# Patient Record
Sex: FEMALE | Race: WHITE | HISPANIC OR LATINO | Employment: OTHER | URBAN - METROPOLITAN AREA
[De-identification: names, ages, dates, MRNs, and addresses within clinical notes are randomized per-mention and may not be internally consistent; named-entity substitution may affect disease eponyms.]

---

## 2020-01-16 ENCOUNTER — OFFICE VISIT (OUTPATIENT)
Dept: FAMILY MEDICINE | Facility: CLINIC | Age: 70
End: 2020-01-16
Payer: MEDICARE

## 2020-01-16 VITALS
HEART RATE: 68 BPM | HEIGHT: 62 IN | WEIGHT: 176 LBS | SYSTOLIC BLOOD PRESSURE: 122 MMHG | DIASTOLIC BLOOD PRESSURE: 90 MMHG | BODY MASS INDEX: 32.39 KG/M2

## 2020-01-16 DIAGNOSIS — E11.9 TYPE 2 DIABETES MELLITUS WITHOUT COMPLICATION, WITHOUT LONG-TERM CURRENT USE OF INSULIN: ICD-10-CM

## 2020-01-16 DIAGNOSIS — M06.9 RHEUMATOID ARTHRITIS, INVOLVING UNSPECIFIED SITE, UNSPECIFIED RHEUMATOID FACTOR PRESENCE: ICD-10-CM

## 2020-01-16 DIAGNOSIS — M10.9 GOUT, UNSPECIFIED CAUSE, UNSPECIFIED CHRONICITY, UNSPECIFIED SITE: ICD-10-CM

## 2020-01-16 DIAGNOSIS — R30.0 DYSURIA: ICD-10-CM

## 2020-01-16 DIAGNOSIS — Z79.899 HIGH RISK MEDICATION USE: ICD-10-CM

## 2020-01-16 DIAGNOSIS — E78.2 MIXED HYPERLIPIDEMIA: ICD-10-CM

## 2020-01-16 DIAGNOSIS — I10 ESSENTIAL HYPERTENSION: ICD-10-CM

## 2020-01-16 DIAGNOSIS — K21.9 GASTROESOPHAGEAL REFLUX DISEASE, ESOPHAGITIS PRESENCE NOT SPECIFIED: Primary | ICD-10-CM

## 2020-01-16 DIAGNOSIS — M54.16 LUMBAR RADICULOPATHY: ICD-10-CM

## 2020-01-16 DIAGNOSIS — E03.9 HYPOTHYROIDISM (ACQUIRED): ICD-10-CM

## 2020-01-16 LAB
BILIRUB SERPL-MCNC: NORMAL MG/DL
BLOOD URINE, POC: NORMAL
COLOR, POC UA: YELLOW
GLUCOSE UR QL STRIP: NORMAL
KETONES UR QL STRIP: NORMAL
LEUKOCYTE ESTERASE URINE, POC: NORMAL
NITRITE, POC UA: NORMAL
PH, POC UA: 6
PROTEIN, POC: NORMAL
SPECIFIC GRAVITY, POC UA: 1.01
UROBILINOGEN, POC UA: NORMAL

## 2020-01-16 PROCEDURE — 1159F MED LIST DOCD IN RCRD: CPT | Mod: S$GLB,,, | Performed by: FAMILY MEDICINE

## 2020-01-16 PROCEDURE — 99203 OFFICE O/P NEW LOW 30 MIN: CPT | Mod: S$GLB,,, | Performed by: FAMILY MEDICINE

## 2020-01-16 PROCEDURE — 99203 PR OFFICE/OUTPT VISIT, NEW, LEVL III, 30-44 MIN: ICD-10-PCS | Mod: S$GLB,,, | Performed by: FAMILY MEDICINE

## 2020-01-16 PROCEDURE — 81003 POCT URINALYSIS, DIPSTICK OR TABLET REAGENT, AUTOMATED, WITH MICROSCOP: ICD-10-PCS | Mod: QW,S$GLB,, | Performed by: FAMILY MEDICINE

## 2020-01-16 PROCEDURE — 81003 URINALYSIS AUTO W/O SCOPE: CPT | Mod: QW,S$GLB,, | Performed by: FAMILY MEDICINE

## 2020-01-16 PROCEDURE — 1159F PR MEDICATION LIST DOCUMENTED IN MEDICAL RECORD: ICD-10-PCS | Mod: S$GLB,,, | Performed by: FAMILY MEDICINE

## 2020-01-16 RX ORDER — OMEPRAZOLE 40 MG/1
40 CAPSULE, DELAYED RELEASE ORAL DAILY
COMMUNITY
End: 2020-02-03 | Stop reason: SDUPTHER

## 2020-01-16 RX ORDER — METHOTREXATE 2.5 MG/1
20 TABLET ORAL
Qty: 32 TABLET | Refills: 5 | Status: SHIPPED | OUTPATIENT
Start: 2020-01-16 | End: 2020-01-18

## 2020-01-16 RX ORDER — OMEPRAZOLE 40 MG/1
40 CAPSULE, DELAYED RELEASE ORAL DAILY
Qty: 90 CAPSULE | Refills: 1 | Status: SHIPPED | OUTPATIENT
Start: 2020-01-16 | End: 2020-06-02 | Stop reason: SDUPTHER

## 2020-01-16 RX ORDER — VALSARTAN 160 MG/1
160 TABLET ORAL DAILY
Qty: 90 TABLET | Refills: 3 | Status: SHIPPED | OUTPATIENT
Start: 2020-01-16 | End: 2021-03-09 | Stop reason: SDUPTHER

## 2020-01-16 RX ORDER — ALLOPURINOL 300 MG/1
300 TABLET ORAL 2 TIMES DAILY
Qty: 90 TABLET | Refills: 1 | Status: SHIPPED | OUTPATIENT
Start: 2020-01-16 | End: 2020-06-02 | Stop reason: SDUPTHER

## 2020-01-16 RX ORDER — PRAVASTATIN SODIUM 40 MG/1
40 TABLET ORAL NIGHTLY
COMMUNITY
End: 2020-02-03 | Stop reason: SDUPTHER

## 2020-01-16 RX ORDER — CARVEDILOL 3.12 MG/1
3.12 TABLET ORAL 2 TIMES DAILY WITH MEALS
Qty: 180 TABLET | Refills: 1 | Status: SHIPPED | OUTPATIENT
Start: 2020-01-16 | End: 2020-06-02 | Stop reason: SDUPTHER

## 2020-01-16 RX ORDER — METFORMIN HYDROCHLORIDE EXTENDED-RELEASE TABLETS 500 MG/1
500 TABLET, FILM COATED, EXTENDED RELEASE ORAL
Qty: 90 TABLET | Refills: 3 | Status: SHIPPED | OUTPATIENT
Start: 2020-01-16 | End: 2020-03-12 | Stop reason: ALTCHOICE

## 2020-01-16 RX ORDER — METFORMIN HYDROCHLORIDE 500 MG/1
500 TABLET, EXTENDED RELEASE ORAL NIGHTLY
COMMUNITY
End: 2020-02-03 | Stop reason: SDUPTHER

## 2020-01-16 RX ORDER — VALSARTAN 160 MG/1
160 TABLET ORAL DAILY
COMMUNITY
End: 2020-02-03 | Stop reason: SDUPTHER

## 2020-01-16 RX ORDER — PRAVASTATIN SODIUM 40 MG/1
40 TABLET ORAL DAILY
Qty: 90 TABLET | Refills: 3 | Status: SHIPPED | OUTPATIENT
Start: 2020-01-16 | End: 2021-03-11 | Stop reason: SDUPTHER

## 2020-01-16 RX ORDER — GABAPENTIN 100 MG/1
100 CAPSULE ORAL NIGHTLY
COMMUNITY
End: 2020-02-03

## 2020-01-16 RX ORDER — PHENAZOPYRIDINE HYDROCHLORIDE 200 MG/1
200 TABLET, FILM COATED ORAL
Qty: 9 TABLET | Refills: 0 | Status: SHIPPED | OUTPATIENT
Start: 2020-01-16 | End: 2020-01-19

## 2020-01-16 RX ORDER — GABAPENTIN 300 MG/1
300 CAPSULE ORAL 2 TIMES DAILY
Qty: 180 CAPSULE | Refills: 1 | Status: SHIPPED | OUTPATIENT
Start: 2020-01-16 | End: 2020-06-02 | Stop reason: SDUPTHER

## 2020-01-16 RX ORDER — LEVOTHYROXINE SODIUM 100 UG/1
100 TABLET ORAL
Qty: 90 TABLET | Refills: 1 | Status: SHIPPED | OUTPATIENT
Start: 2020-01-16 | End: 2020-06-02 | Stop reason: SDUPTHER

## 2020-01-16 NOTE — PROGRESS NOTES
SUBJECTIVE:    Patient ID: Lizette Palafox is a 69 y.o. female.    Chief Complaint: Establish Care; Bilateral Shoulder Pain = 8 now , travels down arms; intermittent feet swelling; and burning with urination x2 weeks    Patient with history of rheumatoid arthritis, borderline diabetes and gout presents as a new patient to me.  She is relocating back to the area.  She last saw her primary care doctor approximately 6 months ago and had lab work done at that time.  She is currently complaining about some increased joint pain. She has been out of methotrexate for at least a week.  She reports swelling in her hands and her feet.  She also complains of a 2 week history of dysuria.  She has pain upon initiating urination..  She is having some frequency and nocturia.  She denies hematuria.  She denies vaginal symptoms.  Denies constipation      Past Medical History:   Diagnosis Date    Arthritis     Rheumatoid    Encounter for blood transfusion     Gout     Hypertension     Thyroid disease      Past Surgical History:   Procedure Laterality Date    APPENDECTOMY       SECTION      x3    DILATION AND CURETTAGE OF UTERUS      GALLBLADDER SURGERY      TOTAL HIP ARTHROPLASTY       No family history on file.    Marital Status: Legally   Alcohol History:  reports that she drank alcohol.  Tobacco History:  reports that she has never smoked. She has never used smokeless tobacco.  Drug History:  reports that she does not use drugs.    Review of patient's allergies indicates:   Allergen Reactions    Levaquin [levofloxacin] Hives and Edema       Current Outpatient Medications:     gabapentin (NEURONTIN) 100 MG capsule, Take 100 mg by mouth every evening., Disp: , Rfl:     metFORMIN (GLUCOPHAGE-XR) 500 MG 24 hr tablet, Take 500 mg by mouth every evening., Disp: , Rfl:     omeprazole (PRILOSEC) 40 MG capsule, Take 40 mg by mouth once daily., Disp: , Rfl:     pravastatin (PRAVACHOL) 40 MG tablet, Take 40 mg  by mouth every evening., Disp: , Rfl:     valsartan (DIOVAN) 160 MG tablet, Take 160 mg by mouth once daily., Disp: , Rfl:     allopurinol (ZYLOPRIM) 300 MG tablet, Take 1 tablet (300 mg total) by mouth 2 (two) times daily., Disp: 90 tablet, Rfl: 1    carvedilol (COREG) 3.125 MG tablet, Take 1 tablet (3.125 mg total) by mouth 2 (two) times daily with meals., Disp: 180 tablet, Rfl: 1    gabapentin (NEURONTIN) 300 MG capsule, Take 1 capsule (300 mg total) by mouth 2 (two) times daily. For nerve pain, Disp: 180 capsule, Rfl: 1    levothyroxine (SYNTHROID) 100 MCG tablet, Take 1 tablet (100 mcg total) by mouth before breakfast., Disp: 90 tablet, Rfl: 1    metFORMIN (FORTAMET) 500 mg 24 hr tablet, Take 1 tablet (500 mg total) by mouth daily with dinner or evening meal., Disp: 90 tablet, Rfl: 3    methotrexate 2.5 MG Tab, Take 8 tablets (20 mg total) by mouth every 7 days., Disp: 32 tablet, Rfl: 0    omeprazole (PRILOSEC) 40 MG capsule, Take 1 capsule (40 mg total) by mouth once daily., Disp: 90 capsule, Rfl: 1    pravastatin (PRAVACHOL) 40 MG tablet, Take 1 tablet (40 mg total) by mouth once daily., Disp: 90 tablet, Rfl: 3    valsartan (DIOVAN) 160 MG tablet, Take 1 tablet (160 mg total) by mouth once daily., Disp: 90 tablet, Rfl: 3    Review of Systems   Constitutional: Negative for activity change, fatigue and unexpected weight change.   HENT: Negative for hearing loss, postnasal drip, sinus pressure, sore throat and voice change.    Eyes: Negative for photophobia and visual disturbance.   Respiratory: Negative for cough, shortness of breath and wheezing.    Cardiovascular: Negative for chest pain and palpitations.   Gastrointestinal: Negative for constipation, diarrhea and nausea.   Genitourinary: Negative for difficulty urinating, frequency, hematuria and urgency.   Musculoskeletal: Positive for arthralgias, back pain, gait problem, joint swelling and myalgias.   Skin: Negative for rash.   Neurological:  "Negative for weakness, light-headedness and headaches.   Hematological: Negative for adenopathy. Does not bruise/bleed easily.   Psychiatric/Behavioral: The patient is not nervous/anxious.           Objective:      Vitals:    01/16/20 1531   BP: (!) 122/90   Pulse: 68   Weight: 79.8 kg (176 lb)   Height: 5' 2" (1.575 m)     Physical Exam   Constitutional: She is oriented to person, place, and time. Vital signs are normal. She appears well-developed and well-nourished. No distress.   Ambulates with cane   HENT:   Head: Normocephalic and atraumatic.   Right Ear: Tympanic membrane and external ear normal.   Left Ear: Tympanic membrane and external ear normal.   Eyes: Pupils are equal, round, and reactive to light. Conjunctivae, EOM and lids are normal.   Neck: Full passive range of motion without pain. Neck supple. No JVD present. No tracheal deviation present. No thyromegaly present.   Cardiovascular: Normal rate and regular rhythm. PMI is not displaced.   Pulmonary/Chest: Effort normal and breath sounds normal.   Abdominal: Soft. Bowel sounds are normal. There is no hepatosplenomegaly. There is no tenderness. There is no rebound and no guarding.   Musculoskeletal: Normal range of motion. She exhibits edema. She exhibits no tenderness.   Swelling and enlargement at PIP and MCP joints fof both hands   Neurological: She is alert and oriented to person, place, and time.   Skin: Skin is warm and dry. No rash noted.   Psychiatric: She has a normal mood and affect.   Vitals reviewed.        Assessment:       1. Gastroesophageal reflux disease, esophagitis presence not specified    2. Essential hypertension    3. Hypothyroidism (acquired)    4. Rheumatoid arthritis, involving unspecified site, unspecified rheumatoid factor presence    5. Gout, unspecified cause, unspecified chronicity, unspecified site    6. Type 2 diabetes mellitus without complication, without long-term current use of insulin    7. Mixed hyperlipidemia  "   8. Lumbar radiculopathy    9. Dysuria    10. High risk medication use         Plan:       Gastroesophageal reflux disease, esophagitis presence not specified  -     omeprazole (PRILOSEC) 40 MG capsule; Take 1 capsule (40 mg total) by mouth once daily.  Dispense: 90 capsule; Refill: 1    Essential hypertension  -     Comprehensive metabolic panel; Future; Expected date: 02/03/2020  -     carvedilol (COREG) 3.125 MG tablet; Take 1 tablet (3.125 mg total) by mouth 2 (two) times daily with meals.  Dispense: 180 tablet; Refill: 1  -     valsartan (DIOVAN) 160 MG tablet; Take 1 tablet (160 mg total) by mouth once daily.  Dispense: 90 tablet; Refill: 3    Hypothyroidism (acquired)  -     TSH w/reflex to FT4; Future; Expected date: 02/03/2020  -     levothyroxine (SYNTHROID) 100 MCG tablet; Take 1 tablet (100 mcg total) by mouth before breakfast.  Dispense: 90 tablet; Refill: 1    Rheumatoid arthritis, involving unspecified site, unspecified rheumatoid factor presence  -     Discontinue: methotrexate 2.5 MG Tab; Take 8 tablets (20 mg total) by mouth every 7 days.  Dispense: 32 tablet; Refill: 5    Gout, unspecified cause, unspecified chronicity, unspecified site  -     Uric acid; Future; Expected date: 02/03/2020  -     allopurinol (ZYLOPRIM) 300 MG tablet; Take 1 tablet (300 mg total) by mouth 2 (two) times daily.  Dispense: 90 tablet; Refill: 1    Type 2 diabetes mellitus without complication, without long-term current use of insulin  -     Hemoglobin A1c; Future; Expected date: 02/03/2020  -     Lipid panel; Future; Expected date: 02/03/2020  -     Microalbumin/creatinine urine ratio; Future; Expected date: 02/03/2020  -     metFORMIN (FORTAMET) 500 mg 24 hr tablet; Take 1 tablet (500 mg total) by mouth daily with dinner or evening meal.  Dispense: 90 tablet; Refill: 3    Mixed hyperlipidemia  -     Lipid panel; Future; Expected date: 02/03/2020  -     pravastatin (PRAVACHOL) 40 MG tablet; Take 1 tablet (40 mg total)  by mouth once daily.  Dispense: 90 tablet; Refill: 3    Lumbar radiculopathy  Comments:  mostly on the right  Orders:  -     gabapentin (NEURONTIN) 300 MG capsule; Take 1 capsule (300 mg total) by mouth 2 (two) times daily. For nerve pain  Dispense: 180 capsule; Refill: 1    Dysuria  -     POCT urinalysis, dipstick or tablet reag  -     phenazopyridine (PYRIDIUM) 200 MG tablet; Take 1 tablet (200 mg total) by mouth 3 (three) times daily with meals. for 3 days  Dispense: 9 tablet; Refill: 0    High risk medication use  -     CBC auto differential; Future; Expected date: 02/03/2020      Follow up in about 4 weeks (around 2/13/2020) for Diabetic Check-Up.        Medication adjustments made.  Patient to return for recheck on diabetes and illness

## 2020-01-17 ENCOUNTER — TELEPHONE (OUTPATIENT)
Dept: FAMILY MEDICINE | Facility: CLINIC | Age: 70
End: 2020-01-17

## 2020-01-17 DIAGNOSIS — M06.9 RHEUMATOID ARTHRITIS, INVOLVING UNSPECIFIED SITE, UNSPECIFIED RHEUMATOID FACTOR PRESENCE: ICD-10-CM

## 2020-01-17 NOTE — TELEPHONE ENCOUNTER
----- Message from Kinsey Erazo sent at 1/17/2020  9:51 AM CST -----  Contact: Lizette  Refill Methotrexate 2.5 mg . The patient is out .  [facility] on [address] pts # [phone] GH

## 2020-01-18 ENCOUNTER — HOSPITAL ENCOUNTER (EMERGENCY)
Facility: HOSPITAL | Age: 70
Discharge: HOME | End: 2020-01-18
Attending: EMERGENCY MEDICINE
Payer: MEDICARE

## 2020-01-18 VITALS
BODY MASS INDEX: 32.02 KG/M2 | TEMPERATURE: 98 F | WEIGHT: 174 LBS | OXYGEN SATURATION: 98 % | SYSTOLIC BLOOD PRESSURE: 132 MMHG | HEART RATE: 82 BPM | HEIGHT: 62 IN | RESPIRATION RATE: 18 BRPM | DIASTOLIC BLOOD PRESSURE: 72 MMHG

## 2020-01-18 DIAGNOSIS — M06.9 RHEUMATOID ARTHRITIS FLARE: Primary | ICD-10-CM

## 2020-01-18 DIAGNOSIS — M06.9 RHEUMATOID ARTHRITIS, INVOLVING UNSPECIFIED SITE, UNSPECIFIED RHEUMATOID FACTOR PRESENCE: ICD-10-CM

## 2020-01-18 PROCEDURE — 99282 EMERGENCY DEPT VISIT SF MDM: CPT

## 2020-01-18 RX ORDER — METHOTREXATE 2.5 MG/1
2.5 TABLET ORAL
Qty: 8 TABLET | Refills: 0 | Status: SHIPPED | OUTPATIENT
Start: 2020-01-18 | End: 2020-01-23 | Stop reason: SDUPTHER

## 2020-01-18 NOTE — ED NOTES
Pt c/o R arm and neck pain x1 week. Pts R arm is reddened and swollen at the wrist. Pt denies trauma to the area. Pt states her L arm is starting to feel the same way. Pt states she thinks it has to do with her not taking her Methotrexate. States she has been out of her medication for the past week as well. Pt is resting on stretcher, NAD, aaax3 and is alone in room.

## 2020-01-18 NOTE — ED PROVIDER NOTES
Encounter Date: 2020       History     Chief Complaint   Patient presents with    Arm Pain     bilateral arm pain out of RA medication     Presents with complaint of generalize joint pain worse at bilateral wrist.  Pt reports she was seen by her PCP for a refill of her Mexotrexate She was told DR. Mccord would call the medication in but when she went to the pharmacy it was denied.  I call [facility] and the tech told me it was denied because Dr. Mccord failed to call it in        Review of patient's allergies indicates:   Allergen Reactions    Levaquin [levofloxacin] Hives and Edema     Past Medical History:   Diagnosis Date    Arthritis     Rheumatoid    Encounter for blood transfusion     Gout     Hypertension     Thyroid disease      Past Surgical History:   Procedure Laterality Date    APPENDECTOMY       SECTION      x3    DILATION AND CURETTAGE OF UTERUS      GALLBLADDER SURGERY      TOTAL HIP ARTHROPLASTY       History reviewed. No pertinent family history.  Social History     Tobacco Use    Smoking status: Never Smoker    Smokeless tobacco: Never Used   Substance Use Topics    Alcohol use: Not Currently     Frequency: Never    Drug use: Never     Review of Systems   Constitutional: Negative for fever.   Respiratory: Negative for cough, shortness of breath and wheezing.    Cardiovascular: Negative for chest pain, palpitations and leg swelling.   Gastrointestinal: Negative for abdominal pain, diarrhea, nausea and vomiting.   Genitourinary: Negative for dysuria.   Musculoskeletal: Positive for joint swelling. Negative for back pain.        Joint pain   Skin: Negative for rash.   Neurological: Negative for weakness.       Physical Exam     Initial Vitals [20 1201]   BP Pulse Resp Temp SpO2   132/72 82 18 98.3 °F (36.8 °C) 98 %      MAP       --         Physical Exam    Constitutional: She appears well-developed and well-nourished.   HENT:   Head: Normocephalic and atraumatic.    Eyes: Conjunctivae are normal.   Neck: Normal range of motion. Neck supple.   Cardiovascular: Normal rate and regular rhythm.   Pulmonary/Chest: Breath sounds normal. No respiratory distress.   Musculoskeletal: Normal range of motion.   Joint swelling at the wrist increase pain with movement pain with movement    Neurological: She is alert and oriented to person, place, and time. No sensory deficit. GCS score is 15. GCS eye subscore is 4. GCS verbal subscore is 5. GCS motor subscore is 6.   Skin: Skin is warm and dry. Capillary refill takes less than 2 seconds.   Psychiatric: She has a normal mood and affect. Thought content normal.         ED Course   Procedures  Labs Reviewed - No data to display       Imaging Results    None          Medical Decision Making:   Initial Assessment:   Joint pain and swelling  Have discussed this pt with DR. Longoria              Attending Attestation:     Physician Attestation Statement for NP/PA:   I discussed this assessment and plan of this patient with the NP/PA, but I did not personally examine the patient. The face to face encounter was performed by the NP/PA.                                Clinical Impression:       ICD-10-CM ICD-9-CM   1. Rheumatoid arthritis flare M06.9 714.0   2. Rheumatoid arthritis, involving unspecified site, unspecified rheumatoid factor presence M06.9 714.0                             Poppy Gonzalez NP  01/18/20 1320       Lauri Cyr MD  01/19/20 1861

## 2020-01-23 DIAGNOSIS — M06.9 RHEUMATOID ARTHRITIS, INVOLVING UNSPECIFIED SITE, UNSPECIFIED RHEUMATOID FACTOR PRESENCE: Primary | ICD-10-CM

## 2020-01-23 RX ORDER — METHOTREXATE 2.5 MG/1
20 TABLET ORAL
Qty: 32 TABLET | Refills: 0 | Status: SHIPPED | OUTPATIENT
Start: 2020-01-23 | End: 2020-02-03 | Stop reason: SDUPTHER

## 2020-01-23 NOTE — TELEPHONE ENCOUNTER
----- Message from David Jesus sent at 1/22/2020 11:14 AM CST -----  Contact: Elsie Jimenez   Pt was in the ER and she needs to schedule an ER follow up and she also needs a refill on her methotrexate 2.5 MG   [facility] to [facility] [address]  Pt# [phone]

## 2020-01-23 NOTE — TELEPHONE ENCOUNTER
Spoke with pts daughter visit scheduled.  Requests methotrexate refill to be sent to [facility] [address] -DN

## 2020-01-24 NOTE — TELEPHONE ENCOUNTER
prescription sent to   [id number] - [place], [place] - [address]  AT [facility]  [address] [facility] [address]  Phone: [phone] Fax: [phone]

## 2020-02-03 ENCOUNTER — OFFICE VISIT (OUTPATIENT)
Dept: FAMILY MEDICINE | Facility: CLINIC | Age: 70
End: 2020-02-03
Payer: MEDICARE

## 2020-02-03 VITALS
SYSTOLIC BLOOD PRESSURE: 124 MMHG | WEIGHT: 174 LBS | BODY MASS INDEX: 32.02 KG/M2 | DIASTOLIC BLOOD PRESSURE: 82 MMHG | HEART RATE: 74 BPM | HEIGHT: 62 IN

## 2020-02-03 DIAGNOSIS — M06.9 RHEUMATOID ARTHRITIS, INVOLVING UNSPECIFIED SITE, UNSPECIFIED RHEUMATOID FACTOR PRESENCE: Primary | ICD-10-CM

## 2020-02-03 DIAGNOSIS — E11.9 TYPE 2 DIABETES MELLITUS WITHOUT COMPLICATION, WITHOUT LONG-TERM CURRENT USE OF INSULIN: ICD-10-CM

## 2020-02-03 DIAGNOSIS — M10.9 GOUT, UNSPECIFIED CAUSE, UNSPECIFIED CHRONICITY, UNSPECIFIED SITE: ICD-10-CM

## 2020-02-03 PROCEDURE — 99214 PR OFFICE/OUTPT VISIT, EST, LEVL IV, 30-39 MIN: ICD-10-PCS | Mod: S$GLB,,, | Performed by: FAMILY MEDICINE

## 2020-02-03 PROCEDURE — 99214 OFFICE O/P EST MOD 30 MIN: CPT | Mod: S$GLB,,, | Performed by: FAMILY MEDICINE

## 2020-02-03 RX ORDER — NAPROXEN SODIUM 220 MG/1
81 TABLET, FILM COATED ORAL DAILY
COMMUNITY

## 2020-02-03 RX ORDER — METHOTREXATE 2.5 MG/1
20 TABLET ORAL
Qty: 32 TABLET | Refills: 0 | Status: SHIPPED | OUTPATIENT
Start: 2020-02-03 | End: 2020-03-02 | Stop reason: SDUPTHER

## 2020-02-03 RX ORDER — PREDNISONE 20 MG/1
TABLET ORAL
Qty: 20 TABLET | Refills: 0 | Status: SHIPPED | OUTPATIENT
Start: 2020-02-03 | End: 2020-05-06 | Stop reason: SDUPTHER

## 2020-02-03 NOTE — PROGRESS NOTES
SUBJECTIVE:    Patient ID: Lizette Palafox is a 69 y.o. female.    Chief Complaint: Hospital Follow Up and Shoulder Pain (right shoulder pain all the way down to arm)    Patient presents status post ER visit a few weeks ago secondary to rheumatoid arthritis flare.  She has a new patient to me about a month ago.  She had been taking methotrexate but without of her medications.  She has been having more pain in her wrists and shoulders.  She was given gabapentin help with neuropathic pain but has not found much help with this.  No issues with other medications.     Office Visit on 2020   Component Date Value Ref Range Status    Color, UA 2020 Yellow   Final    Spec Grav UA 2020 1.015   Final    pH, UA 2020 6.0   Final    WBC, UA 2020 -   Final    Nitrite, UA 2020 -   Final    Protein 2020 -   Final    Glucose, UA 2020 -   Final    Ketones, UA 2020 -   Final    Urobilinogen, UA 2020 +-   Final    Bilirubin 2020 -   Final    Blood, UA 2020 -   Final       Past Medical History:   Diagnosis Date    Arthritis     Rheumatoid    Encounter for blood transfusion     Gout     Hypertension     Thyroid disease      Past Surgical History:   Procedure Laterality Date    APPENDECTOMY       SECTION      x3    DILATION AND CURETTAGE OF UTERUS      GALLBLADDER SURGERY      TOTAL HIP ARTHROPLASTY       No family history on file.    Marital Status: Legally   Alcohol History:  reports that she drank alcohol.  Tobacco History:  reports that she has never smoked. She has never used smokeless tobacco.  Drug History:  reports that she does not use drugs.    Review of patient's allergies indicates:   Allergen Reactions    Levaquin [levofloxacin] Hives and Edema       Current Outpatient Medications:     allopurinol (ZYLOPRIM) 300 MG tablet, Take 1 tablet (300 mg total) by mouth 2 (two) times daily., Disp: 90 tablet, Rfl: 1     "aspirin (ECOTRIN) 81 MG EC tablet, Take 81 mg by mouth once daily., Disp: , Rfl:     carvedilol (COREG) 3.125 MG tablet, Take 1 tablet (3.125 mg total) by mouth 2 (two) times daily with meals., Disp: 180 tablet, Rfl: 1    gabapentin (NEURONTIN) 300 MG capsule, Take 1 capsule (300 mg total) by mouth 2 (two) times daily. For nerve pain, Disp: 180 capsule, Rfl: 1    levothyroxine (SYNTHROID) 100 MCG tablet, Take 1 tablet (100 mcg total) by mouth before breakfast., Disp: 90 tablet, Rfl: 1    metFORMIN (FORTAMET) 500 mg 24 hr tablet, Take 1 tablet (500 mg total) by mouth daily with dinner or evening meal., Disp: 90 tablet, Rfl: 3    methotrexate 2.5 MG Tab, Take 8 tablets (20 mg total) by mouth every 7 days., Disp: 32 tablet, Rfl: 0    omeprazole (PRILOSEC) 40 MG capsule, Take 1 capsule (40 mg total) by mouth once daily., Disp: 90 capsule, Rfl: 1    pravastatin (PRAVACHOL) 40 MG tablet, Take 1 tablet (40 mg total) by mouth once daily., Disp: 90 tablet, Rfl: 3    valsartan (DIOVAN) 160 MG tablet, Take 1 tablet (160 mg total) by mouth once daily., Disp: 90 tablet, Rfl: 3    predniSONE (DELTASONE) 20 MG tablet, Three tablets daily for 3 days, 2 tablets daily for 3 days, 1 tablet daily for 3 days, half tablet daily for 4 days for arthritis flare, Disp: 20 tablet, Rfl: 0    Review of Systems   Constitutional: Positive for fatigue.   Musculoskeletal: Positive for arthralgias and joint swelling.          Objective:      Vitals:    02/03/20 1207   BP: 124/82   Pulse: 74   Weight: 78.9 kg (174 lb)   Height: 5' 2" (1.575 m)     Physical Exam   Constitutional: She is oriented to person, place, and time. She appears well-developed and well-nourished.   Uncomfortable due to pain.  Using a walker.   Cardiovascular: Normal rate and regular rhythm.   Pulmonary/Chest: Effort normal and breath sounds normal.   Abdominal: Soft. Bowel sounds are normal.   Musculoskeletal:        Right shoulder: She exhibits decreased range of " motion.   Swelling of bilateral wrists   Neurological: She is alert and oriented to person, place, and time.   Skin: Skin is warm and dry. No erythema.   Vitals reviewed.        Assessment:       1. Rheumatoid arthritis, involving unspecified site, unspecified rheumatoid factor presence    2. Gout, unspecified cause, unspecified chronicity, unspecified site    3. Type 2 diabetes mellitus without complication, without long-term current use of insulin         Plan:       Rheumatoid arthritis, involving unspecified site, unspecified rheumatoid factor presence  -     Ambulatory Referral to Rheumatology  -     predniSONE (DELTASONE) 20 MG tablet; Three tablets daily for 3 days, 2 tablets daily for 3 days, 1 tablet daily for 3 days, half tablet daily for 4 days for arthritis flare  Dispense: 20 tablet; Refill: 0  -     methotrexate 2.5 MG Tab; Take 8 tablets (20 mg total) by mouth every 7 days.  Dispense: 32 tablet; Refill: 0    Gout, unspecified cause, unspecified chronicity, unspecified site  -     Ambulatory Referral to Rheumatology    Type 2 diabetes mellitus without complication, without long-term current use of insulin      Follow up for keep current.

## 2020-02-03 NOTE — PATIENT INSTRUCTIONS
Watch your diet while taking steroids to prevent high sugars.  Avoid cakes candies cookies and other sugary items. labs due in 2-3 weeks

## 2020-02-17 ENCOUNTER — TELEPHONE (OUTPATIENT)
Dept: FAMILY MEDICINE | Facility: CLINIC | Age: 70
End: 2020-02-17

## 2020-02-17 NOTE — TELEPHONE ENCOUNTER
----- Message from David Jesus sent at 2/17/2020 11:55 AM CST -----  Contact: Lizette condon  Pt needs nurse to give her a call back please.   Pt# [phone]   Detail Level: Detailed

## 2020-02-17 NOTE — TELEPHONE ENCOUNTER
PT RETURNED CALL, states she is unable to see dr mccrary until July.  New phone number given to try and schedule with dr power.  Pt verbalized understanding -DN

## 2020-02-18 ENCOUNTER — TELEPHONE (OUTPATIENT)
Dept: FAMILY MEDICINE | Facility: CLINIC | Age: 70
End: 2020-02-18

## 2020-02-18 NOTE — TELEPHONE ENCOUNTER
Spoke to patient:  She will have fasting labs done later this week for OV 3/2 with Dr prater. Orders in [facility] from 1/16./ba

## 2020-03-02 ENCOUNTER — OFFICE VISIT (OUTPATIENT)
Dept: FAMILY MEDICINE | Facility: CLINIC | Age: 70
End: 2020-03-02
Payer: MEDICARE

## 2020-03-02 DIAGNOSIS — E11.9 TYPE 2 DIABETES MELLITUS WITHOUT COMPLICATION, WITHOUT LONG-TERM CURRENT USE OF INSULIN: ICD-10-CM

## 2020-03-02 DIAGNOSIS — I10 ESSENTIAL HYPERTENSION: ICD-10-CM

## 2020-03-02 DIAGNOSIS — M06.9 RHEUMATOID ARTHRITIS, INVOLVING UNSPECIFIED SITE, UNSPECIFIED RHEUMATOID FACTOR PRESENCE: ICD-10-CM

## 2020-03-02 DIAGNOSIS — M10.9 GOUT, UNSPECIFIED CAUSE, UNSPECIFIED CHRONICITY, UNSPECIFIED SITE: ICD-10-CM

## 2020-03-02 DIAGNOSIS — M05.9 RHEUMATOID ARTHRITIS WITH POSITIVE RHEUMATOID FACTOR, INVOLVING UNSPECIFIED SITE: Primary | ICD-10-CM

## 2020-03-02 DIAGNOSIS — Z79.899 HIGH RISK MEDICATION USE: ICD-10-CM

## 2020-03-02 DIAGNOSIS — J01.00 ACUTE NON-RECURRENT MAXILLARY SINUSITIS: ICD-10-CM

## 2020-03-02 DIAGNOSIS — E78.2 MIXED HYPERLIPIDEMIA: ICD-10-CM

## 2020-03-02 DIAGNOSIS — E03.9 HYPOTHYROIDISM (ACQUIRED): ICD-10-CM

## 2020-03-02 PROCEDURE — 99214 PR OFFICE/OUTPT VISIT, EST, LEVL IV, 30-39 MIN: ICD-10-PCS | Mod: S$GLB,,, | Performed by: FAMILY MEDICINE

## 2020-03-02 PROCEDURE — 99214 OFFICE O/P EST MOD 30 MIN: CPT | Mod: S$GLB,,, | Performed by: FAMILY MEDICINE

## 2020-03-02 RX ORDER — METHOTREXATE 2.5 MG/1
20 TABLET ORAL
Qty: 32 TABLET | Refills: 5 | Status: SHIPPED | OUTPATIENT
Start: 2020-03-02 | End: 2021-01-12 | Stop reason: SDUPTHER

## 2020-03-02 RX ORDER — AZITHROMYCIN 250 MG/1
TABLET, FILM COATED ORAL
Qty: 6 TABLET | Refills: 0 | Status: SHIPPED | OUTPATIENT
Start: 2020-03-02 | End: 2020-06-02 | Stop reason: ALTCHOICE

## 2020-03-02 NOTE — PROGRESS NOTES
SUBJECTIVE:    Patient ID: Lizette Palafox is a 69 y.o. female.    Chief Complaint: 4 Week Followup and productive cough    Patient with past medical history of rheumatoid arthritis on methotrexate presents with a one-week history of productive cough.  She complains of sore throat and runny nose but no fever.  she is using Tylenol to help with the sore throat.  Denies shortness of breath or dyspnea on exertion.  Day reports all day cough. Used otc meds but this made her dizzy  Steroids given for weeks ago for rheumatoid flare and shoulder pain. This has improved.  She has an appointment with the rheumatologist with it is not for another 4 months.        Past Medical History:   Diagnosis Date    Arthritis     Rheumatoid    Encounter for blood transfusion     Gout     Hypertension     Thyroid disease      Past Surgical History:   Procedure Laterality Date    APPENDECTOMY       SECTION      x3    DILATION AND CURETTAGE OF UTERUS      GALLBLADDER SURGERY      TOTAL HIP ARTHROPLASTY       History reviewed. No pertinent family history.    Marital Status: Legally   Alcohol History:  reports that she drank alcohol.  Tobacco History:  reports that she has never smoked. She has never used smokeless tobacco.  Drug History:  reports that she does not use drugs.    Review of patient's allergies indicates:   Allergen Reactions    Levaquin [levofloxacin] Hives and Edema       Current Outpatient Medications:     allopurinol (ZYLOPRIM) 300 MG tablet, Take 1 tablet (300 mg total) by mouth 2 (two) times daily., Disp: 90 tablet, Rfl: 1    aspirin (ECOTRIN) 81 MG EC tablet, Take 81 mg by mouth once daily., Disp: , Rfl:     carvedilol (COREG) 3.125 MG tablet, Take 1 tablet (3.125 mg total) by mouth 2 (two) times daily with meals., Disp: 180 tablet, Rfl: 1    gabapentin (NEURONTIN) 300 MG capsule, Take 1 capsule (300 mg total) by mouth 2 (two) times daily. For nerve pain, Disp: 180 capsule, Rfl: 1     "levothyroxine (SYNTHROID) 100 MCG tablet, Take 1 tablet (100 mcg total) by mouth before breakfast., Disp: 90 tablet, Rfl: 1    metFORMIN (FORTAMET) 500 mg 24 hr tablet, Take 1 tablet (500 mg total) by mouth daily with dinner or evening meal., Disp: 90 tablet, Rfl: 3    methotrexate 2.5 MG Tab, Take 8 tablets (20 mg total) by mouth every 7 days., Disp: 32 tablet, Rfl: 5    omeprazole (PRILOSEC) 40 MG capsule, Take 1 capsule (40 mg total) by mouth once daily., Disp: 90 capsule, Rfl: 1    pravastatin (PRAVACHOL) 40 MG tablet, Take 1 tablet (40 mg total) by mouth once daily., Disp: 90 tablet, Rfl: 3    predniSONE (DELTASONE) 20 MG tablet, Three tablets daily for 3 days, 2 tablets daily for 3 days, 1 tablet daily for 3 days, half tablet daily for 4 days for arthritis flare, Disp: 20 tablet, Rfl: 0    valsartan (DIOVAN) 160 MG tablet, Take 1 tablet (160 mg total) by mouth once daily., Disp: 90 tablet, Rfl: 3    azithromycin (ZITHROMAX Z-NESHA) 250 MG tablet, 2 tabs today, then 1 tab daily for next 4 days, Disp: 6 tablet, Rfl: 0    Review of Systems   All other systems reviewed and are negative.         Objective:      Vitals:    03/02/20 1229   BP: (P) 122/70   Pulse: (P) 76   Temp: (P) 98.2 °F (36.8 °C)   Weight: (P) 75.8 kg (167 lb)   Height: (P) 5' 2" (1.575 m)     Physical Exam   Constitutional: She is oriented to person, place, and time. Vital signs are normal. She appears well-developed and well-nourished. No distress.   Mildly ill-appearing  Ambulates with cane   HENT:   Head: Normocephalic and atraumatic.   Right Ear: Tympanic membrane and external ear normal.   Left Ear: Tympanic membrane and external ear normal.   Nose: Mucosal edema and rhinorrhea present. Right sinus exhibits maxillary sinus tenderness. Left sinus exhibits maxillary sinus tenderness.   Eyes: Pupils are equal, round, and reactive to light. Conjunctivae, EOM and lids are normal.   Neck: Full passive range of motion without pain. Neck " supple. No JVD present. No tracheal deviation present. No thyromegaly present.   Cardiovascular: Normal rate and regular rhythm. PMI is not displaced.   Pulmonary/Chest: Effort normal and breath sounds normal.   Abdominal: Soft. Bowel sounds are normal. There is no hepatosplenomegaly. There is no tenderness. There is no rebound and no guarding.   Musculoskeletal: She exhibits deformity. She exhibits no edema or tenderness.   Neurological: She is alert and oriented to person, place, and time.   Skin: Skin is warm and dry. No rash noted.   Psychiatric: She has a normal mood and affect.   Vitals reviewed.        Assessment:       1. Rheumatoid arthritis with positive rheumatoid factor, involving unspecified site    2. Type 2 diabetes mellitus without complication, without long-term current use of insulin    3. Mixed hyperlipidemia    4. Lumbar radiculopathy    5. Gastroesophageal reflux disease, esophagitis presence not specified    6. Essential hypertension    7. Hypothyroidism (acquired)    8. Gout, unspecified cause, unspecified chronicity, unspecified site    9. High risk medication use    10. Acute non-recurrent maxillary sinusitis    11. Rheumatoid arthritis, involving unspecified site, unspecified rheumatoid factor presence         Plan:       Rheumatoid arthritis with positive rheumatoid factor, involving unspecified site  -     Rheumatoid factor; Future; Expected date: 03/02/2020  -     Sedimentation rate; Future; Expected date: 03/02/2020  -     CBC auto differential; Future; Expected date: 03/02/2020    Type 2 diabetes mellitus without complication, without long-term current use of insulin  -     Hemoglobin A1c; Future; Expected date: 03/02/2020  -     Microalbumin/creatinine urine ratio; Future; Expected date: 03/02/2020    Mixed hyperlipidemia  -     Lipid panel; Future; Expected date: 03/02/2020    Lumbar radiculopathy    Gastroesophageal reflux disease, esophagitis presence not specified    Essential  hypertension  -     Comprehensive metabolic panel; Future; Expected date: 03/02/2020    Hypothyroidism (acquired)  -     TSH w/reflex to FT4; Future; Expected date: 03/02/2020    Gout, unspecified cause, unspecified chronicity, unspecified site  -     Uric acid; Future; Expected date: 03/02/2020    High risk medication use    Acute non-recurrent maxillary sinusitis  -     azithromycin (ZITHROMAX Z-NESHA) 250 MG tablet; 2 tabs today, then 1 tab daily for next 4 days  Dispense: 6 tablet; Refill: 0    Rheumatoid arthritis, involving unspecified site, unspecified rheumatoid factor presence  -     methotrexate 2.5 MG Tab; Take 8 tablets (20 mg total) by mouth every 7 days.  Dispense: 32 tablet; Refill: 5    Other orders  -     Sedimentation rate, automated      Follow up in about 3 months (around 6/2/2020) for DM, RA.

## 2020-03-03 ENCOUNTER — TELEPHONE (OUTPATIENT)
Dept: RHEUMATOLOGY | Facility: CLINIC | Age: 70
End: 2020-03-03

## 2020-03-03 LAB
ALBUMIN SERPL-MCNC: 3.8 G/DL (ref 3.6–5.1)
ALBUMIN/CREAT UR: 7 MCG/MG CREAT
ALBUMIN/GLOB SERPL: 1.5 (CALC) (ref 1–2.5)
ALP SERPL-CCNC: 85 U/L (ref 37–153)
ALT SERPL-CCNC: 8 U/L (ref 6–29)
AST SERPL-CCNC: 15 U/L (ref 10–35)
BASOPHILS # BLD AUTO: 38 CELLS/UL (ref 0–200)
BASOPHILS NFR BLD AUTO: 0.4 %
BILIRUB SERPL-MCNC: 0.5 MG/DL (ref 0.2–1.2)
BUN SERPL-MCNC: 22 MG/DL (ref 7–25)
BUN/CREAT SERPL: NORMAL (CALC) (ref 6–22)
CALCIUM SERPL-MCNC: 9.9 MG/DL (ref 8.6–10.4)
CHLORIDE SERPL-SCNC: 108 MMOL/L (ref 98–110)
CHOLEST SERPL-MCNC: 147 MG/DL
CHOLEST/HDLC SERPL: 3 (CALC)
CO2 SERPL-SCNC: 23 MMOL/L (ref 20–32)
CREAT SERPL-MCNC: 0.93 MG/DL (ref 0.6–0.93)
CREAT UR-MCNC: 92 MG/DL (ref 20–275)
EOSINOPHIL # BLD AUTO: 649 CELLS/UL (ref 15–500)
EOSINOPHIL NFR BLD AUTO: 6.9 %
ERYTHROCYTE [DISTWIDTH] IN BLOOD BY AUTOMATED COUNT: 13.5 % (ref 11–15)
ERYTHROCYTE [SEDIMENTATION RATE] IN BLOOD BY WESTERGREN METHOD: 48 MM/H
GFRSERPLBLD MDRD-ARVRAT: 62 ML/MIN/1.73M2
GLOBULIN SER CALC-MCNC: 2.5 G/DL (CALC) (ref 1.9–3.7)
GLUCOSE SERPL-MCNC: 96 MG/DL (ref 65–99)
HBA1C MFR BLD: 5.7 % OF TOTAL HGB
HCT VFR BLD AUTO: 32.8 % (ref 35–45)
HDLC SERPL-MCNC: 49 MG/DL
HGB BLD-MCNC: 10.8 G/DL (ref 11.7–15.5)
LDLC SERPL CALC-MCNC: 75 MG/DL (CALC)
LYMPHOCYTES # BLD AUTO: 1316 CELLS/UL (ref 850–3900)
LYMPHOCYTES NFR BLD AUTO: 14 %
MCH RBC QN AUTO: 33.3 PG (ref 27–33)
MCHC RBC AUTO-ENTMCNC: 32.9 G/DL (ref 32–36)
MCV RBC AUTO: 101.2 FL (ref 80–100)
MICROALBUMIN UR-MCNC: 0.6 MG/DL
MONOCYTES # BLD AUTO: 367 CELLS/UL (ref 200–950)
MONOCYTES NFR BLD AUTO: 3.9 %
NEUTROPHILS # BLD AUTO: 7031 CELLS/UL (ref 1500–7800)
NEUTROPHILS NFR BLD AUTO: 74.8 %
NONHDLC SERPL-MCNC: 98 MG/DL (CALC)
PLATELET # BLD AUTO: 334 THOUSAND/UL (ref 140–400)
PMV BLD REES-ECKER: 9.7 FL (ref 7.5–12.5)
POTASSIUM SERPL-SCNC: 4.3 MMOL/L (ref 3.5–5.3)
PROT SERPL-MCNC: 6.3 G/DL (ref 6.1–8.1)
RBC # BLD AUTO: 3.24 MILLION/UL (ref 3.8–5.1)
RHEUMATOID FACT SERPL-ACNC: <14 IU/ML
SODIUM SERPL-SCNC: 141 MMOL/L (ref 135–146)
T4 FREE SERPL-MCNC: 1.4 NG/DL (ref 0.8–1.8)
TRIGL SERPL-MCNC: 152 MG/DL
TSH SERPL-ACNC: 5.54 MIU/L (ref 0.4–4.5)
URATE SERPL-MCNC: 3 MG/DL (ref 2.5–7)
WBC # BLD AUTO: 9.4 THOUSAND/UL (ref 3.8–10.8)

## 2020-03-03 NOTE — TELEPHONE ENCOUNTER
----- Message from Princess PHANI Campos sent at 3/3/2020 11:49 AM CST -----  Contact: pt  Type: Needs Medical Advice    Who Called:  Patient  Best Call Back Number:   Additional Information: Patient is requesting a call in regards to seeing if the provider has any available appt sooner than July. She has a appt with Dr. Corona.

## 2020-03-04 ENCOUNTER — TELEPHONE (OUTPATIENT)
Dept: FAMILY MEDICINE | Facility: CLINIC | Age: 70
End: 2020-03-04

## 2020-03-04 NOTE — TELEPHONE ENCOUNTER
----- Message from Klaudia Snyder sent at 3/4/2020  4:39 PM CST -----  Pharm faxed over a drug change for Meformin er 500 mg osmotic tabs   Alt drug therapy is preferred product required

## 2020-03-05 NOTE — TELEPHONE ENCOUNTER
Attempted to return patient call. Left a message informing that there is nothing sooner than scheduled appointment.

## 2020-03-10 ENCOUNTER — TELEPHONE (OUTPATIENT)
Dept: FAMILY MEDICINE | Facility: CLINIC | Age: 70
End: 2020-03-10

## 2020-03-10 DIAGNOSIS — Z79.899 LONG-TERM USE OF HIGH-RISK MEDICATION: ICD-10-CM

## 2020-03-10 DIAGNOSIS — E03.9 HYPOTHYROIDISM (ACQUIRED): Primary | ICD-10-CM

## 2020-03-10 NOTE — TELEPHONE ENCOUNTER
----- Message from Josh Mccord MD sent at 3/10/2020  8:15 AM CDT -----  Please contact the patient and let them know that their labs are ok. We will be watching her thyroid however because it was slightly abnormal. Some of her inflammatory markers were elevated as well but this is most likely due to the rheumatoid flare she was having. Continue current medications and make sure to take your folic acid. Repeat tsh and cbc 1 week prior to next visit.

## 2020-03-10 NOTE — TELEPHONE ENCOUNTER
Spoke to patient with results verbatim per Dr Mccodr. Verbalized understanding on all. Remind me created for lab to be done prior to 6/2 office visit.

## 2020-03-10 NOTE — PROGRESS NOTES
Spoke to patient with results verbatim per Dr Mccord. Verbalized understanding on all. Remind me created for lab to be done prior to 6/2 office visit.

## 2020-03-12 DIAGNOSIS — E11.9 TYPE 2 DIABETES MELLITUS WITHOUT COMPLICATION, WITHOUT LONG-TERM CURRENT USE OF INSULIN: Primary | ICD-10-CM

## 2020-03-12 RX ORDER — METFORMIN HYDROCHLORIDE 500 MG/1
500 TABLET, EXTENDED RELEASE ORAL
Qty: 90 TABLET | Refills: 3 | Status: SHIPPED | OUTPATIENT
Start: 2020-03-12 | End: 2021-03-03 | Stop reason: SDUPTHER

## 2020-04-07 ENCOUNTER — TELEPHONE (OUTPATIENT)
Dept: FAMILY MEDICINE | Facility: CLINIC | Age: 70
End: 2020-04-07

## 2020-04-07 NOTE — TELEPHONE ENCOUNTER
----- Message from Juli Maravilla MA sent at 4/7/2020  3:06 PM CDT -----  Pt is requesting a refill:    Methotrexate 2.5       Ph[facility] - [facility]      Pt - [phone]

## 2020-04-07 NOTE — TELEPHONE ENCOUNTER
Patient has refills available at pharmacy.  Patient verbalized understanding.  Informed to call with questions/concerns -DN

## 2020-04-09 ENCOUNTER — TELEPHONE (OUTPATIENT)
Dept: FAMILY MEDICINE | Facility: CLINIC | Age: 70
End: 2020-04-09

## 2020-04-09 NOTE — TELEPHONE ENCOUNTER
Spoke to Adriana.  The patient does not have a smart device so Adriana will set up the Portal on her personal phone.  Set-up info sent to her email address.  She has an android phone.  Re-set overdue reminder for late May for labs due/ba

## 2020-05-06 ENCOUNTER — TELEPHONE (OUTPATIENT)
Dept: FAMILY MEDICINE | Facility: CLINIC | Age: 70
End: 2020-05-06

## 2020-05-06 DIAGNOSIS — M06.9 RHEUMATOID ARTHRITIS, INVOLVING UNSPECIFIED SITE, UNSPECIFIED RHEUMATOID FACTOR PRESENCE: ICD-10-CM

## 2020-05-06 RX ORDER — PREDNISONE 20 MG/1
TABLET ORAL
Qty: 20 TABLET | Refills: 0 | Status: SHIPPED | OUTPATIENT
Start: 2020-05-06 | End: 2020-06-02 | Stop reason: ALTCHOICE

## 2020-05-06 NOTE — TELEPHONE ENCOUNTER
steroid pack sent in for pain. Be sure to watch her sugars. Be sure to keep visit with rheumatology

## 2020-05-06 NOTE — TELEPHONE ENCOUNTER
----- Message from Ngoc Larson sent at 5/6/2020  2:01 PM CDT -----  Contact: Jalen Daughter   Pt is in a lot of pain even taking all of her medications and they cannot get into a Rheumatologist until late May due to COVD.  cb # [phone]

## 2020-05-06 NOTE — TELEPHONE ENCOUNTER
Spoke with patient c/o shoulder r pain in arm, pain stretches to ribs.  Some swelling in the shoulder. L arm is starting to do the same.  Pain = 10 now.  Taking tylenol, only helps some.  Taking gabapentin also.  To dr prater for review -DN

## 2020-05-08 NOTE — TELEPHONE ENCOUNTER
Patient notified of the below message per dr prater.  Patient verbalized understanding, informed to return call with questions/concerns -DN

## 2020-05-18 ENCOUNTER — TELEPHONE (OUTPATIENT)
Dept: FAMILY MEDICINE | Facility: CLINIC | Age: 70
End: 2020-05-18

## 2020-06-02 ENCOUNTER — OFFICE VISIT (OUTPATIENT)
Dept: FAMILY MEDICINE | Facility: CLINIC | Age: 70
End: 2020-06-02
Payer: MEDICARE

## 2020-06-02 VITALS
HEART RATE: 80 BPM | TEMPERATURE: 98 F | SYSTOLIC BLOOD PRESSURE: 110 MMHG | HEIGHT: 62 IN | WEIGHT: 169 LBS | DIASTOLIC BLOOD PRESSURE: 86 MMHG | BODY MASS INDEX: 31.1 KG/M2

## 2020-06-02 DIAGNOSIS — M54.16 LUMBAR RADICULOPATHY: ICD-10-CM

## 2020-06-02 DIAGNOSIS — K21.9 GASTROESOPHAGEAL REFLUX DISEASE, ESOPHAGITIS PRESENCE NOT SPECIFIED: ICD-10-CM

## 2020-06-02 DIAGNOSIS — M05.79 RHEUMATOID ARTHRITIS INVOLVING MULTIPLE SITES WITH POSITIVE RHEUMATOID FACTOR: ICD-10-CM

## 2020-06-02 DIAGNOSIS — E03.9 HYPOTHYROIDISM (ACQUIRED): ICD-10-CM

## 2020-06-02 DIAGNOSIS — M10.9 GOUT, UNSPECIFIED CAUSE, UNSPECIFIED CHRONICITY, UNSPECIFIED SITE: ICD-10-CM

## 2020-06-02 DIAGNOSIS — E11.9 TYPE 2 DIABETES MELLITUS WITHOUT COMPLICATION, WITHOUT LONG-TERM CURRENT USE OF INSULIN: Primary | ICD-10-CM

## 2020-06-02 DIAGNOSIS — I10 ESSENTIAL HYPERTENSION: ICD-10-CM

## 2020-06-02 PROCEDURE — 99214 PR OFFICE/OUTPT VISIT, EST, LEVL IV, 30-39 MIN: ICD-10-PCS | Mod: S$GLB,,, | Performed by: FAMILY MEDICINE

## 2020-06-02 PROCEDURE — 99214 OFFICE O/P EST MOD 30 MIN: CPT | Mod: S$GLB,,, | Performed by: FAMILY MEDICINE

## 2020-06-02 RX ORDER — CARVEDILOL 3.12 MG/1
3.12 TABLET ORAL 2 TIMES DAILY WITH MEALS
Qty: 180 TABLET | Refills: 1 | Status: SHIPPED | OUTPATIENT
Start: 2020-06-02 | End: 2020-09-24 | Stop reason: SDUPTHER

## 2020-06-02 RX ORDER — PREDNISONE 10 MG/1
10 TABLET ORAL DAILY
Qty: 90 TABLET | Refills: 0 | OUTPATIENT
Start: 2020-06-02 | End: 2020-11-05

## 2020-06-02 RX ORDER — GABAPENTIN 300 MG/1
300 CAPSULE ORAL 2 TIMES DAILY
Qty: 180 CAPSULE | Refills: 1 | Status: SHIPPED | OUTPATIENT
Start: 2020-06-02 | End: 2020-09-24 | Stop reason: SDUPTHER

## 2020-06-02 RX ORDER — ALLOPURINOL 300 MG/1
300 TABLET ORAL 2 TIMES DAILY
Qty: 90 TABLET | Refills: 1 | Status: SHIPPED | OUTPATIENT
Start: 2020-06-02 | End: 2020-09-24 | Stop reason: SDUPTHER

## 2020-06-02 RX ORDER — LEVOTHYROXINE SODIUM 100 UG/1
100 TABLET ORAL
Qty: 90 TABLET | Refills: 1 | Status: SHIPPED | OUTPATIENT
Start: 2020-06-02 | End: 2020-09-24 | Stop reason: SDUPTHER

## 2020-06-02 RX ORDER — OMEPRAZOLE 40 MG/1
40 CAPSULE, DELAYED RELEASE ORAL DAILY
Qty: 90 CAPSULE | Refills: 1 | Status: SHIPPED | OUTPATIENT
Start: 2020-06-02 | End: 2020-09-24 | Stop reason: SDUPTHER

## 2020-06-02 RX ORDER — CHOLECALCIFEROL (VITAMIN D3) 25 MCG
2000 TABLET ORAL DAILY
COMMUNITY

## 2020-06-02 NOTE — PROGRESS NOTES
SUBJECTIVE:    Patient ID: Lizette Palafox is a 69 y.o. female.    Chief Complaint: Regular Check Up and pain in arms/legs = 10 now (requests rx for pain)    The patient rheumatoid arthritis, hypothyroidism and diabetes presents with complaints and acute rheumatoid flare.  She states this pain has been is prolonged.  She does take her daily methotrexate.  She was given a prednisone taper a few weeks ago and states that this helped however, symptoms returned after she finished the steroids. Unfortunatley she gains weight and her sugars go up also on steroids.  Denies gout symptoms.  Otherwise she has been remaining quarantined to prevent exposure to corona virus.  Rheumatology visit scheduled for next       Office Visit on 06/02/2020   Component Date Value Ref Range Status    Hemoglobin A1C 06/02/2020 5.3  <5.7 % of total Hgb Final   Office Visit on 03/02/2020   Component Date Value Ref Range Status    TSH w/reflex to FT4 03/02/2020 5.54* 0.40 - 4.50 mIU/L Final    T4, Free 03/02/2020 1.4  0.8 - 1.8 ng/dL Final    Cholesterol 03/02/2020 147  <200 mg/dL Final    HDL 03/02/2020 49* > OR = 50 mg/dL Final    Triglycerides 03/02/2020 152* <150 mg/dL Final    LDL Cholesterol 03/02/2020 75  mg/dL (calc) Final    Hdl/Cholesterol Ratio 03/02/2020 3.0  <5.0 (calc) Final    Non HDL Chol. (LDL+VLDL) 03/02/2020 98  <130 mg/dL (calc) Final    Glucose 03/02/2020 96  65 - 99 mg/dL Final    BUN, Bld 03/02/2020 22  7 - 25 mg/dL Final    Creatinine 03/02/2020 0.93  0.60 - 0.93 mg/dL Final    eGFR if non African American 03/02/2020 62  > OR = 60 mL/min/1.73m2 Final    eGFR if African American 03/02/2020 72  > OR = 60 mL/min/1.73m2 Final    BUN/Creatinine Ratio 03/02/2020 NOT APPLICABLE  6 - 22 (calc) Final    Sodium 03/02/2020 141  135 - 146 mmol/L Final    Potassium 03/02/2020 4.3  3.5 - 5.3 mmol/L Final    Chloride 03/02/2020 108  98 - 110 mmol/L Final    CO2 03/02/2020 23  20 - 32 mmol/L Final    Calcium  03/02/2020 9.9  8.6 - 10.4 mg/dL Final    Total Protein 03/02/2020 6.3  6.1 - 8.1 g/dL Final    Albumin 03/02/2020 3.8  3.6 - 5.1 g/dL Final    Globulin, Total 03/02/2020 2.5  1.9 - 3.7 g/dL (calc) Final    Albumin/Globulin Ratio 03/02/2020 1.5  1.0 - 2.5 (calc) Final    Total Bilirubin 03/02/2020 0.5  0.2 - 1.2 mg/dL Final    Alkaline Phosphatase 03/02/2020 85  37 - 153 U/L Final    AST 03/02/2020 15  10 - 35 U/L Final    ALT 03/02/2020 8  6 - 29 U/L Final    Rheumatoid Factor 03/02/2020 <14  <14 IU/mL Final    WBC 03/02/2020 9.4  3.8 - 10.8 Thousand/uL Final    RBC 03/02/2020 3.24* 3.80 - 5.10 Million/uL Final    Hemoglobin 03/02/2020 10.8* 11.7 - 15.5 g/dL Final    Hematocrit 03/02/2020 32.8* 35.0 - 45.0 % Final    Mean Corpuscular Volume 03/02/2020 101.2* 80.0 - 100.0 fL Final    Mean Corpuscular Hemoglobin 03/02/2020 33.3* 27.0 - 33.0 pg Final    Mean Corpuscular Hemoglobin Conc 03/02/2020 32.9  32.0 - 36.0 g/dL Final    RDW 03/02/2020 13.5  11.0 - 15.0 % Final    Platelets 03/02/2020 334  140 - 400 Thousand/uL Final    MPV 03/02/2020 9.7  7.5 - 12.5 fL Final    Neutrophils Absolute 03/02/2020 7,031  1,500 - 7,800 cells/uL Final    Lymph # 03/02/2020 1,316  850 - 3,900 cells/uL Final    Mono # 03/02/2020 367  200 - 950 cells/uL Final    Eos # 03/02/2020 649* 15 - 500 cells/uL Final    Baso # 03/02/2020 38  0 - 200 cells/uL Final    Neutrophils Relative 03/02/2020 74.8  % Final    Lymph% 03/02/2020 14.0  % Final    Mono% 03/02/2020 3.9  % Final    Eosinophil% 03/02/2020 6.9  % Final    Basophil% 03/02/2020 0.4  % Final    Hemoglobin A1C 03/02/2020 5.7* <5.7 % of total Hgb Final    Creatinine, Random Ur 03/02/2020 92  20 - 275 mg/dL Final    Microalb, Ur 03/02/2020 0.6  See Note: mg/dL Final    Microalb Creat Ratio 03/02/2020 7  <30 mcg/mg creat Final    Uric Acid 03/02/2020 3.0  2.5 - 7.0 mg/dL Final    Sed Rate 03/02/2020 48* < OR = 30 mm/h Final   Office Visit on 01/16/2020    Component Date Value Ref Range Status    Color, UA 2020 Yellow   Final    Spec Grav UA 2020 1.015   Final    pH, UA 2020 6.0   Final    WBC, UA 2020 -   Final    Nitrite, UA 2020 -   Final    Protein 2020 -   Final    Glucose, UA 2020 -   Final    Ketones, UA 2020 -   Final    Urobilinogen, UA 2020 +-   Final    Bilirubin 2020 -   Final    Blood, UA 2020 -   Final       Past Medical History:   Diagnosis Date    Arthritis     Rheumatoid    Encounter for blood transfusion     Gout     Hypertension     Thyroid disease      Past Surgical History:   Procedure Laterality Date    APPENDECTOMY       SECTION      x3    DILATION AND CURETTAGE OF UTERUS      GALLBLADDER SURGERY      TOTAL HIP ARTHROPLASTY       History reviewed. No pertinent family history.    Marital Status: Legally   Alcohol History:  reports that she drank alcohol.  Tobacco History:  reports that she has never smoked. She has never used smokeless tobacco.  Drug History:  reports that she does not use drugs.    Review of patient's allergies indicates:   Allergen Reactions    Levaquin [levofloxacin] Hives and Edema       Current Outpatient Medications:     allopurinoL (ZYLOPRIM) 300 MG tablet, Take 1 tablet (300 mg total) by mouth 2 (two) times daily., Disp: 90 tablet, Rfl: 1    aspirin (ECOTRIN) 81 MG EC tablet, Take 81 mg by mouth once daily., Disp: , Rfl:     carvediloL (COREG) 3.125 MG tablet, Take 1 tablet (3.125 mg total) by mouth 2 (two) times daily with meals., Disp: 180 tablet, Rfl: 1    gabapentin (NEURONTIN) 300 MG capsule, Take 1 capsule (300 mg total) by mouth 2 (two) times daily. For nerve pain, Disp: 180 capsule, Rfl: 1    levothyroxine (SYNTHROID) 100 MCG tablet, Take 1 tablet (100 mcg total) by mouth before breakfast., Disp: 90 tablet, Rfl: 1    metFORMIN (GLUCOPHAGE-XR) 500 MG XR 24hr tablet, Take 1 tablet (500 mg total) by mouth  "daily with breakfast., Disp: 90 tablet, Rfl: 3    methotrexate 2.5 MG Tab, Take 8 tablets (20 mg total) by mouth every 7 days., Disp: 32 tablet, Rfl: 5    omeprazole (PRILOSEC) 40 MG capsule, Take 1 capsule (40 mg total) by mouth once daily., Disp: 90 capsule, Rfl: 1    pravastatin (PRAVACHOL) 40 MG tablet, Take 1 tablet (40 mg total) by mouth once daily., Disp: 90 tablet, Rfl: 3    valsartan (DIOVAN) 160 MG tablet, Take 1 tablet (160 mg total) by mouth once daily., Disp: 90 tablet, Rfl: 3    vitamin D (VITAMIN D3) 1000 units Tab, Take 1,000 Units by mouth once daily., Disp: , Rfl:     predniSONE (DELTASONE) 10 MG tablet, Take 1 tablet (10 mg total) by mouth once daily., Disp: 90 tablet, Rfl: 0    Review of Systems   Constitutional: Negative for activity change, fatigue and unexpected weight change.   HENT: Negative for hearing loss, postnasal drip, sinus pressure, sore throat and voice change.    Eyes: Negative for photophobia and visual disturbance.   Respiratory: Negative for cough, shortness of breath and wheezing.    Cardiovascular: Negative for chest pain and palpitations.   Gastrointestinal: Negative for constipation, diarrhea and nausea.   Genitourinary: Negative for difficulty urinating, frequency, hematuria and urgency.   Musculoskeletal: Positive for arthralgias. Negative for back pain and joint swelling.   Skin: Negative for rash.   Neurological: Negative for weakness, light-headedness and headaches.   Hematological: Negative for adenopathy. Does not bruise/bleed easily.   Psychiatric/Behavioral: The patient is not nervous/anxious.           Objective:      Vitals:    06/02/20 1421   BP: 110/86   Pulse: 80   Temp: 98.3 °F (36.8 °C)   Weight: 76.7 kg (169 lb)   Height: 5' 2" (1.575 m)     Physical Exam   Constitutional: She is oriented to person, place, and time. Vital signs are normal. She appears well-developed and well-nourished. She appears distressed.   Ambulates walker.  Uncomfortable " secondary to pain.   HENT:   Head: Normocephalic and atraumatic.   Right Ear: Tympanic membrane and external ear normal.   Left Ear: Tympanic membrane and external ear normal.   Eyes: Pupils are equal, round, and reactive to light. Conjunctivae, EOM and lids are normal.   Neck: Full passive range of motion without pain. Neck supple. No JVD present. No tracheal deviation present. No thyromegaly present.   Cardiovascular: Normal rate and regular rhythm. PMI is not displaced.   Pulmonary/Chest: Effort normal and breath sounds normal.   Abdominal: Soft. Bowel sounds are normal. There is no hepatosplenomegaly. There is no tenderness. There is no rebound and no guarding.   Musculoskeletal: She exhibits no edema or tenderness.   Neurological: She is alert and oriented to person, place, and time.   Skin: Skin is warm and dry. No rash noted.   Psychiatric: She has a normal mood and affect.   Vitals reviewed.        Assessment:       1. Type 2 diabetes mellitus without complication, without long-term current use of insulin    2. Gout, unspecified cause, unspecified chronicity, unspecified site    3. Essential hypertension    4. Lumbar radiculopathy    5. Hypothyroidism (acquired)    6. Gastroesophageal reflux disease, esophagitis presence not specified    7. Rheumatoid arthritis involving multiple sites with positive rheumatoid factor         Plan:       Type 2 diabetes mellitus without complication, without long-term current use of insulin  -     Hemoglobin A1C; Future; Expected date: 06/02/2020    Gout, unspecified cause, unspecified chronicity, unspecified site  -     allopurinoL (ZYLOPRIM) 300 MG tablet; Take 1 tablet (300 mg total) by mouth 2 (two) times daily.  Dispense: 90 tablet; Refill: 1    Essential hypertension  -     carvediloL (COREG) 3.125 MG tablet; Take 1 tablet (3.125 mg total) by mouth 2 (two) times daily with meals.  Dispense: 180 tablet; Refill: 1    Lumbar radiculopathy  Comments:  mostly on the  right  Orders:  -     gabapentin (NEURONTIN) 300 MG capsule; Take 1 capsule (300 mg total) by mouth 2 (two) times daily. For nerve pain  Dispense: 180 capsule; Refill: 1    Hypothyroidism (acquired)  -     levothyroxine (SYNTHROID) 100 MCG tablet; Take 1 tablet (100 mcg total) by mouth before breakfast.  Dispense: 90 tablet; Refill: 1    Gastroesophageal reflux disease, esophagitis presence not specified  -     omeprazole (PRILOSEC) 40 MG capsule; Take 1 capsule (40 mg total) by mouth once daily.  Dispense: 90 capsule; Refill: 1    Rheumatoid arthritis involving multiple sites with positive rheumatoid factor  -     predniSONE (DELTASONE) 10 MG tablet; Take 1 tablet (10 mg total) by mouth once daily.  Dispense: 90 tablet; Refill: 0     The patient to remain on low-dose steroids to her visit with rheumatology on next month  Follow up in about 4 months (around 10/2/2020) for Diabetic Check-Up.

## 2020-06-03 LAB — HBA1C MFR BLD: 5.3 % OF TOTAL HGB

## 2020-08-17 ENCOUNTER — HOSPITAL ENCOUNTER (OUTPATIENT)
Dept: RADIOLOGY | Facility: HOSPITAL | Age: 70
Discharge: HOME | End: 2020-08-17
Attending: INTERNAL MEDICINE
Payer: MEDICARE

## 2020-08-17 ENCOUNTER — OFFICE VISIT (OUTPATIENT)
Dept: RHEUMATOLOGY | Facility: CLINIC | Age: 70
End: 2020-08-17
Payer: MEDICARE

## 2020-08-17 VITALS
TEMPERATURE: 98 F | SYSTOLIC BLOOD PRESSURE: 123 MMHG | WEIGHT: 175.31 LBS | DIASTOLIC BLOOD PRESSURE: 76 MMHG | BODY MASS INDEX: 32.06 KG/M2

## 2020-08-17 DIAGNOSIS — D53.9 NUTRITIONAL ANEMIA, UNSPECIFIED: ICD-10-CM

## 2020-08-17 DIAGNOSIS — D64.9 ANEMIA, UNSPECIFIED TYPE: ICD-10-CM

## 2020-08-17 DIAGNOSIS — M06.9 RHEUMATOID ARTHRITIS INVOLVING MULTIPLE SITES, UNSPECIFIED RHEUMATOID FACTOR PRESENCE: ICD-10-CM

## 2020-08-17 DIAGNOSIS — M06.9 RHEUMATOID ARTHRITIS INVOLVING MULTIPLE SITES, UNSPECIFIED RHEUMATOID FACTOR PRESENCE: Primary | ICD-10-CM

## 2020-08-17 DIAGNOSIS — E55.9 HYPOVITAMINOSIS D: ICD-10-CM

## 2020-08-17 PROCEDURE — 73560 X-RAY EXAM OF KNEE 1 OR 2: CPT | Mod: TC,50

## 2020-08-17 PROCEDURE — 99203 OFFICE O/P NEW LOW 30 MIN: CPT | Mod: S$GLB,,, | Performed by: INTERNAL MEDICINE

## 2020-08-17 PROCEDURE — 99203 PR OFFICE/OUTPT VISIT, NEW, LEVL III, 30-44 MIN: ICD-10-PCS | Mod: S$GLB,,, | Performed by: INTERNAL MEDICINE

## 2020-08-17 PROCEDURE — 73130 X-RAY EXAM OF HAND: CPT | Mod: TC,50

## 2020-08-17 RX ORDER — TRAMADOL HYDROCHLORIDE 50 MG/1
TABLET ORAL
Qty: 90 TABLET | Refills: 2 | OUTPATIENT
Start: 2020-08-17 | End: 2020-11-05

## 2020-08-17 RX ORDER — FOLIC ACID 1 MG/1
1 TABLET ORAL DAILY
COMMUNITY
End: 2020-11-05

## 2020-08-17 NOTE — PROGRESS NOTES
"         [facility] RHEUMATOLOGY        NEW PATIENT      Subjective:       Patient ID:   NAME: Lizette Palafox : 1950     70 y.o. female    Referring Doc: Josh Mccord MD  Other Physicians:    Chief Complaint:  Rheumatoid Arthritis      History of Present Illness:     New patient , last seen in ,for Rheumatoid Arthritis.  Was living in [place] and most recently moved back to [place] to help her daughter with her grandchildren.  No fevers,cough or SOB  Has been on MTX , 20 mg a week ,at least for the last few yrs.  Finds she is doing well except for L knee pain.Was told she has " bone on bone" changes on L knee.  She did not want knee replacement surgery.  Was restarted on prednisone 10 mg a day in .  No history of serositis  No fevers cough or shortness of breath.  No prolonged morning stiffness joint swelling or severe arthralgias in hands or feet.    ROS:   GEN: no fevers night sweats +significant weight changes( 9# gained within last 6 months)   - fatigue  HEENT: + occ  HA's, changes in vision , no mouth ulcers, no sicca symptoms, no scalp tenderness, jaw claudication  CV: no CP, SOB, PND, ATKINSON or orthopnea,no palpitations  PULM:no SOB, cough, hemoptysis, sputum or pleuritic pain  GI: no abdominal pain, nausea, vomiting, constipation, diarrhea, melanotic stools, BRBPR, or hematemesis, no dysphagia  : no hematuria, dysuria  NEURO:+ paresthesias in feet,No headaches, visual disturbances, muscle weakness  SKIN:  no rashes , erythema, bruising, or swelling, no Raynauds, no photosensitivity  MUSCULOSKELETAL:no joint swelling, no prolonged AM stiffness, no back pain   PSYCH:   + sl Insomnia, -  depression, - anxiety    Medications:    Current Outpatient Medications:     allopurinoL (ZYLOPRIM) 300 MG tablet, Take 1 tablet (300 mg total) by mouth 2 (two) times daily., Disp: 90 tablet, Rfl: 1    aspirin (ECOTRIN) 81 MG EC tablet, Take 81 mg by mouth once daily., Disp: , Rfl:     " carvediloL (COREG) 3.125 MG tablet, Take 1 tablet (3.125 mg total) by mouth 2 (two) times daily with meals., Disp: 180 tablet, Rfl: 1    folic acid (FOLVITE) 1 MG tablet, Take 1 mg by mouth once daily., Disp: , Rfl:     gabapentin (NEURONTIN) 300 MG capsule, Take 1 capsule (300 mg total) by mouth 2 (two) times daily. For nerve pain, Disp: 180 capsule, Rfl: 1    levothyroxine (SYNTHROID) 100 MCG tablet, Take 1 tablet (100 mcg total) by mouth before breakfast., Disp: 90 tablet, Rfl: 1    metFORMIN (GLUCOPHAGE-XR) 500 MG XR 24hr tablet, Take 1 tablet (500 mg total) by mouth daily with breakfast., Disp: 90 tablet, Rfl: 3    methotrexate 2.5 MG Tab, Take 8 tablets (20 mg total) by mouth every 7 days., Disp: 32 tablet, Rfl: 5    omeprazole (PRILOSEC) 40 MG capsule, Take 1 capsule (40 mg total) by mouth once daily., Disp: 90 capsule, Rfl: 1    pravastatin (PRAVACHOL) 40 MG tablet, Take 1 tablet (40 mg total) by mouth once daily., Disp: 90 tablet, Rfl: 3    predniSONE (DELTASONE) 10 MG tablet, Take 1 tablet (10 mg total) by mouth once daily., Disp: 90 tablet, Rfl: 0    valsartan (DIOVAN) 160 MG tablet, Take 1 tablet (160 mg total) by mouth once daily., Disp: 90 tablet, Rfl: 3    vitamin D (VITAMIN D3) 1000 units Tab, Take 2,000 Units by mouth once daily. , Disp: , Rfl:   FAMILY HISTORY: negative for Connective Tissue Disease      PAST MEDICAL HISTORY:  DM with neuropathy  HTN  Stomach Ulcer 1983  Hypothyroidism  Hypercholesterolemia  Hyperuricemia  PAST SURGICAL HISTORY:  RTHR 6 yrs ago  Hysterectomy/ oophorectomy 3 yrs ago  Appendectomy  Cholecystectomy  SOCIAL HISTORY:  Lives with daughter  No ETOH or smoking ALLERGIES:          Objective:     Vitals:  Blood pressure 123/76, temperature 97.5 °F (36.4 °C), weight 79.5 kg (175 lb 4.8 oz).    Physical Examination:   GEN: no apparent distress, comfortable; AAOx3  SKIN: no rashes, no lesions, no sclerodactyly or induration, no Raynaud's, no periungual erythema  HEAD:  normal  EYES: no pallor, no icterus,  NECK: no masses, thyroid normal, trachea midline, no LAD/LN's, supple  CV:   S1 and S2 regular, no murmurs, gallop or rubs  CHEST: Normal respiratory effort;  normal breath sounds; no rubs, no wheezes, no crackles.   ABDOM: nontender and nondistended; soft; ; no rebound/guarding,no masses  MUSC/Skeletal: ROM normal; no crepitus; joints without synovitis, no deformities   EXTREM: no clubbing, cyanosis, edema, normal pulses.  NEURO: grossly intact; motorWNL; AAOx3; no tremors  PSYCH: normal mood, affect and behavior  LYMPH: normal cervical, supraclavicular            Labs:   @RESUFAST(WBC,HGB,HCT,MCV,PLT)  )@RESUFAST(NA,K,CL,CO2,GLU,BUN,Creatinine,Calcium,PROT,Albumin,Bilitot,Alkphos,AST,ALT,MALINI,Sed Rate,CRP,RF,CCP)      Radiology/Diagnostic Studies:    I have reviewed all available labs and XRay reports    Assessment/Plan:   70 y.o. female with history of rheumatoid arthritis.  She was last seen in 2014.  She is doing well on methotrexate.  Reviewing blood work from March she was anemic.  Patient states she had anemia years ago needed transfusion.  History of hypohyroidism   history of anemia    PLAN:  CBC CMP CRP CCP antibody, uric acid, vitamin-D level, iron studies  X-ray of knees and hands  DEXA bone density  Tramadol 50 mg b.i.d. p.r.n. she used to take 100 mg at bedtime.  She can take  mg as needed.  Decrease prednisone to 5 mg a day for a week or 2.  Then 2.5 mg a day for a week or 2 then 2.5 mg every couple of days for a week or 2 and then stop  Discussion:     I have explained all of the above in detail and the patient understands all of the current recommendation(s). I have answered all of their questions to the best of my ability and to their complete satisfaction.      I have reviewed the risks and benefits of the medication in detail with patient, who understands and wishes to proceed. Printed information regarding the disease and/or medication was also  provided.        RTC 3 months or before if needed        Electronically signed by Surendra Corona MD

## 2020-08-17 NOTE — LETTER
August 17, 2020      Josh Mccord MD  [address]  [address]  [place] [address]           [facility] - Rheumatology  [address]  [address]  [place] [address]  Phone: [phone]  Fax: [phone]          Patient: Lizette Palaofx   MR Number: [id number]   YOB: 1950   Date of Visit: 8/17/2020       Dear Dr. Josh Mccord:    Thank you for referring Lizette Palafox to me for evaluation. Attached you will find relevant portions of my assessment and plan of care.    If you have questions, please do not hesitate to call me. I look forward to following Lizette Palafox along with you.    Sincerely,    Surendra Corona MD    Enclosure  CC:  No Recipients    If you would like to receive this communication electronically, please contact [email or web address] or [phone] to request more information on [facility]  Link access.    For providers and/or their staff who would like to refer a patient to [facility], please contact us through our one-stop-shop provider referral line, [facility], at [phone].    If you feel you have received this communication in error or would no longer like to receive these types of communications, please e-mail [email or web address]

## 2020-09-17 ENCOUNTER — TELEPHONE (OUTPATIENT)
Dept: FAMILY MEDICINE | Facility: CLINIC | Age: 70
End: 2020-09-17

## 2020-09-17 NOTE — TELEPHONE ENCOUNTER
----- Message from David Jesus sent at 9/17/2020 10:47 AM CDT -----  Regarding: Ping appt next week  Contact: Adriana holloway's daughter  Daughter would like for the mom to see Dr. Mccord next week early in the morning/ any day. Pt has been having a lot of leg and back pain . Where can I schedule her ?   Adriana's [phone]

## 2020-09-24 ENCOUNTER — OFFICE VISIT (OUTPATIENT)
Dept: FAMILY MEDICINE | Facility: CLINIC | Age: 70
End: 2020-09-24
Payer: MEDICARE

## 2020-09-24 VITALS
HEIGHT: 62 IN | TEMPERATURE: 98 F | DIASTOLIC BLOOD PRESSURE: 72 MMHG | WEIGHT: 176 LBS | BODY MASS INDEX: 32.39 KG/M2 | HEART RATE: 80 BPM | SYSTOLIC BLOOD PRESSURE: 122 MMHG

## 2020-09-24 DIAGNOSIS — K21.9 GASTROESOPHAGEAL REFLUX DISEASE WITHOUT ESOPHAGITIS: ICD-10-CM

## 2020-09-24 DIAGNOSIS — I10 ESSENTIAL HYPERTENSION: ICD-10-CM

## 2020-09-24 DIAGNOSIS — M54.16 LUMBAR RADICULOPATHY: ICD-10-CM

## 2020-09-24 DIAGNOSIS — M10.9 GOUT, UNSPECIFIED CAUSE, UNSPECIFIED CHRONICITY, UNSPECIFIED SITE: ICD-10-CM

## 2020-09-24 DIAGNOSIS — Z23 FLU VACCINE NEED: ICD-10-CM

## 2020-09-24 DIAGNOSIS — M06.9 RHEUMATOID ARTHRITIS INVOLVING MULTIPLE SITES, UNSPECIFIED RHEUMATOID FACTOR PRESENCE: ICD-10-CM

## 2020-09-24 DIAGNOSIS — E11.9 TYPE 2 DIABETES MELLITUS WITHOUT COMPLICATION, WITHOUT LONG-TERM CURRENT USE OF INSULIN: ICD-10-CM

## 2020-09-24 DIAGNOSIS — E78.2 MIXED HYPERLIPIDEMIA: ICD-10-CM

## 2020-09-24 DIAGNOSIS — E03.9 HYPOTHYROIDISM (ACQUIRED): Primary | ICD-10-CM

## 2020-09-24 PROBLEM — T38.0X5A STEROID-INDUCED DIABETES: Status: ACTIVE | Noted: 2020-09-24

## 2020-09-24 PROBLEM — E09.9 STEROID-INDUCED DIABETES: Status: ACTIVE | Noted: 2020-09-24

## 2020-09-24 PROCEDURE — 99214 PR OFFICE/OUTPT VISIT, EST, LEVL IV, 30-39 MIN: ICD-10-PCS | Mod: 25,S$GLB,, | Performed by: FAMILY MEDICINE

## 2020-09-24 PROCEDURE — 90662 FLU VACCINE - QUADRIVALENT - HIGH DOSE (65+) PRESERVATIVE FREE IM: ICD-10-PCS | Mod: S$GLB,,, | Performed by: FAMILY MEDICINE

## 2020-09-24 PROCEDURE — G0008 ADMIN INFLUENZA VIRUS VAC: HCPCS | Mod: S$GLB,,, | Performed by: FAMILY MEDICINE

## 2020-09-24 PROCEDURE — G0008 FLU VACCINE - QUADRIVALENT - HIGH DOSE (65+) PRESERVATIVE FREE IM: ICD-10-PCS | Mod: S$GLB,,, | Performed by: FAMILY MEDICINE

## 2020-09-24 PROCEDURE — 90662 IIV NO PRSV INCREASED AG IM: CPT | Mod: S$GLB,,, | Performed by: FAMILY MEDICINE

## 2020-09-24 PROCEDURE — 99214 OFFICE O/P EST MOD 30 MIN: CPT | Mod: 25,S$GLB,, | Performed by: FAMILY MEDICINE

## 2020-09-24 RX ORDER — LEVOTHYROXINE SODIUM 100 UG/1
100 TABLET ORAL
Qty: 90 TABLET | Refills: 1 | Status: SHIPPED | OUTPATIENT
Start: 2020-09-24 | End: 2021-03-03 | Stop reason: SDUPTHER

## 2020-09-24 RX ORDER — OMEPRAZOLE 40 MG/1
40 CAPSULE, DELAYED RELEASE ORAL DAILY
Qty: 90 CAPSULE | Refills: 1 | Status: SHIPPED | OUTPATIENT
Start: 2020-09-24 | End: 2021-03-03 | Stop reason: SDUPTHER

## 2020-09-24 RX ORDER — ALLOPURINOL 300 MG/1
300 TABLET ORAL 2 TIMES DAILY
Qty: 90 TABLET | Refills: 1 | Status: SHIPPED | OUTPATIENT
Start: 2020-09-24 | End: 2021-03-03 | Stop reason: SDUPTHER

## 2020-09-24 RX ORDER — GABAPENTIN 300 MG/1
300 CAPSULE ORAL 2 TIMES DAILY
Qty: 180 CAPSULE | Refills: 1 | Status: SHIPPED | OUTPATIENT
Start: 2020-09-24 | End: 2021-03-03 | Stop reason: SDUPTHER

## 2020-09-24 RX ORDER — CARVEDILOL 3.12 MG/1
3.12 TABLET ORAL 2 TIMES DAILY WITH MEALS
Qty: 180 TABLET | Refills: 1 | Status: SHIPPED | OUTPATIENT
Start: 2020-09-24 | End: 2021-03-03 | Stop reason: SDUPTHER

## 2020-09-29 ENCOUNTER — TELEPHONE (OUTPATIENT)
Dept: FAMILY MEDICINE | Facility: CLINIC | Age: 70
End: 2020-09-29

## 2020-09-29 DIAGNOSIS — M06.9 RHEUMATOID ARTHRITIS INVOLVING MULTIPLE SITES, UNSPECIFIED RHEUMATOID FACTOR PRESENCE: Primary | ICD-10-CM

## 2020-09-29 NOTE — TELEPHONE ENCOUNTER
----- Message from David Jesus sent at 9/29/2020 10:42 AM CDT -----  Regarding: Needs call back from nurse  Contact: Adriana pt's daughter  Pt's daughter would like to know if the prescription for a new walker for the patient, goes through [facility] pharmacy ? Or through her insurance ? Because [facility] says they never received a prescription  on her walker. Please give Adriana a call back # [phone]

## 2020-09-29 NOTE — TELEPHONE ENCOUNTER
Order goes through the insurance in the medical equipment company.  Not the pharmacy. Script is printed

## 2020-09-30 NOTE — TELEPHONE ENCOUNTER
pts daughter returned call, notified of the below message per dr prater.  Adriana verbalized understanding -DN

## 2020-10-01 NOTE — PROGRESS NOTES
SUBJECTIVE:    Patient ID: Lizette Palafox is a 70 y.o. female.    Chief Complaint: 3M Check Up / A1C and flu vaccine today    Patient with gout, rheumatoid arthritis hypertension and thyroid disease presents for follow-up.  She remains on chronic medications for rheumatoid arthritis.  She has had visit with rheumatology and they are working with her therapy.  Her symptoms feel better when she is on steroids.  She does report a worsening of pain in her knees and her ankles.  Her gait has been unsteady.  She denies falls.  Blood pressure is well controlled.  She has been appropriately isolating herself to prevent COVID-19 exposure.  Thyroid and gout are well controlled.  Vitamin-D is stable as well.  She is up-to-date with immunizations.  She will have her flu vaccination today.        Lab Visit on 08/17/2020   Component Date Value Ref Range Status    WBC 08/17/2020 10.11  3.90 - 12.70 K/uL Final    RBC 08/17/2020 3.33* 4.00 - 5.40 M/uL Final    Hemoglobin 08/17/2020 11.6* 12.0 - 16.0 g/dL Final    Hematocrit 08/17/2020 34.8* 37.0 - 48.5 % Final    Mean Corpuscular Volume 08/17/2020 105* 82 - 98 fL Final    Mean Corpuscular Hemoglobin 08/17/2020 34.8* 27.0 - 31.0 pg Final    Mean Corpuscular Hemoglobin Conc 08/17/2020 33.3  32.0 - 36.0 g/dL Final    RDW 08/17/2020 14.7* 11.5 - 14.5 % Final    Platelets 08/17/2020 296  150 - 350 K/uL Final    MPV 08/17/2020 9.5  9.2 - 12.9 fL Final    Immature Granulocytes 08/17/2020 1.6* 0.0 - 0.5 % Final    Gran # (ANC) 08/17/2020 7.8* 1.8 - 7.7 K/uL Final    Immature Grans (Abs) 08/17/2020 0.16* 0.00 - 0.04 K/uL Final    Lymph # 08/17/2020 1.4  1.0 - 4.8 K/uL Final    Mono # 08/17/2020 0.6  0.3 - 1.0 K/uL Final    Eos # 08/17/2020 0.1  0.0 - 0.5 K/uL Final    Baso # 08/17/2020 0.05  0.00 - 0.20 K/uL Final    nRBC 08/17/2020 0  0 /100 WBC Final    Gran% 08/17/2020 77.2* 38.0 - 73.0 % Final    Lymph% 08/17/2020 14.1* 18.0 - 48.0 % Final    Mono% 08/17/2020 5.6   4.0 - 15.0 % Final    Eosinophil% 2020 1.0  0.0 - 8.0 % Final    Basophil% 2020 0.5  0.0 - 1.9 % Final    Differential Method 2020 Automated   Final    Sodium 2020 142  136 - 145 mmol/L Final    Potassium 2020 4.5  3.5 - 5.1 mmol/L Final    Chloride 2020 111* 95 - 110 mmol/L Final    CO2 2020 25  23 - 29 mmol/L Final    Glucose 2020 106  70 - 110 mg/dL Final    BUN, Bld 2020 20  8 - 23 mg/dL Final    Creatinine 2020 1.0  0.5 - 1.4 mg/dL Final    Calcium 2020 9.8  8.7 - 10.5 mg/dL Final    Total Protein 2020 6.7  6.0 - 8.4 g/dL Final    Albumin 2020 3.9  3.5 - 5.2 g/dL Final    Total Bilirubin 2020 0.4  0.1 - 1.0 mg/dL Final    Alkaline Phosphatase 2020 55  55 - 135 U/L Final    AST 2020 17  10 - 40 U/L Final    ALT 2020 14  10 - 44 U/L Final    Anion Gap 2020 6* 8 - 16 mmol/L Final    eGFR if African American 2020 >60.0  >60 mL/min/1.73 m^2 Final    eGFR if non African American 2020 56.8* >60 mL/min/1.73 m^2 Final    CRP 2020 0.04  <0.76 mg/dL Final    Uric Acid 2020 2.9  2.4 - 5.7 mg/dL Final    Vit D, 25-Hydroxy 2020 31  30 - 96 ng/mL Final    Sed Rate 2020 20  0 - 20 mm/Hr Final    CCP Antibodies 2020 6  0 - 19 units Final    Vitamin B-12 2020 292  210 - 950 pg/mL Final   Office Visit on 2020   Component Date Value Ref Range Status    Hemoglobin A1C 2020 5.3  <5.7 % of total Hgb Final     Past Medical History:   Diagnosis Date    Arthritis     Rheumatoid    Encounter for blood transfusion     Gout     Hypertension     Thyroid disease      Past Surgical History:   Procedure Laterality Date    APPENDECTOMY       SECTION      x3    DILATION AND CURETTAGE OF UTERUS      GALLBLADDER SURGERY      TOTAL HIP ARTHROPLASTY       No family history on file.    Marital Status: Legally   Alcohol History:   reports previous alcohol use.  Tobacco History:  reports that she has never smoked. She has never used smokeless tobacco.  Drug History:  reports no history of drug use.    Review of patient's allergies indicates:   Allergen Reactions    Levaquin [levofloxacin] Hives and Edema       Current Outpatient Medications:     allopurinoL (ZYLOPRIM) 300 MG tablet, Take 1 tablet (300 mg total) by mouth 2 (two) times daily., Disp: 90 tablet, Rfl: 1    aspirin (ECOTRIN) 81 MG EC tablet, Take 81 mg by mouth once daily., Disp: , Rfl:     carvediloL (COREG) 3.125 MG tablet, Take 1 tablet (3.125 mg total) by mouth 2 (two) times daily with meals., Disp: 180 tablet, Rfl: 1    folic acid (FOLVITE) 1 MG tablet, Take 1 mg by mouth once daily., Disp: , Rfl:     gabapentin (NEURONTIN) 300 MG capsule, Take 1 capsule (300 mg total) by mouth 2 (two) times daily. For nerve pain, Disp: 180 capsule, Rfl: 1    levothyroxine (SYNTHROID) 100 MCG tablet, Take 1 tablet (100 mcg total) by mouth before breakfast., Disp: 90 tablet, Rfl: 1    metFORMIN (GLUCOPHAGE-XR) 500 MG XR 24hr tablet, Take 1 tablet (500 mg total) by mouth daily with breakfast., Disp: 90 tablet, Rfl: 3    methotrexate 2.5 MG Tab, Take 8 tablets (20 mg total) by mouth every 7 days., Disp: 32 tablet, Rfl: 5    omeprazole (PRILOSEC) 40 MG capsule, Take 1 capsule (40 mg total) by mouth once daily., Disp: 90 capsule, Rfl: 1    pravastatin (PRAVACHOL) 40 MG tablet, Take 1 tablet (40 mg total) by mouth once daily., Disp: 90 tablet, Rfl: 3    predniSONE (DELTASONE) 10 MG tablet, Take 1 tablet (10 mg total) by mouth once daily., Disp: 90 tablet, Rfl: 0    traMADoL (ULTRAM) 50 mg tablet, One po am prn, 2 po pm prn, Disp: 90 tablet, Rfl: 2    valsartan (DIOVAN) 160 MG tablet, Take 1 tablet (160 mg total) by mouth once daily., Disp: 90 tablet, Rfl: 3    vitamin D (VITAMIN D3) 1000 units Tab, Take 2,000 Units by mouth once daily. , Disp: , Rfl:     Review of Systems  "  Constitutional: Negative for activity change, fatigue and unexpected weight change.   HENT: Negative for hearing loss, postnasal drip, sinus pressure, sore throat and voice change.    Eyes: Negative for photophobia and visual disturbance.   Respiratory: Negative for cough, shortness of breath and wheezing.    Cardiovascular: Negative for chest pain and palpitations.   Gastrointestinal: Negative for constipation, diarrhea and nausea.   Genitourinary: Negative for difficulty urinating, frequency, hematuria and urgency.   Musculoskeletal: Positive for arthralgias, gait problem and joint swelling. Negative for back pain.   Skin: Negative for rash.   Neurological: Negative for weakness, light-headedness and headaches.   Hematological: Negative for adenopathy. Does not bruise/bleed easily.   Psychiatric/Behavioral: The patient is not nervous/anxious.           Objective:      Vitals:    09/24/20 1050   BP: 122/72   Pulse: 80   Temp: 98.2 °F (36.8 °C)   Weight: 79.8 kg (176 lb)   Height: 5' 2" (1.575 m)     Physical Exam  Vitals signs reviewed.   Constitutional:       General: She is not in acute distress.     Appearance: Normal appearance. She is well-developed.   HENT:      Head: Normocephalic and atraumatic.      Right Ear: External ear normal.      Left Ear: External ear normal.      Nose: Nose normal.      Mouth/Throat:      Mouth: Mucous membranes are moist.   Eyes:      General: Lids are normal.      Conjunctiva/sclera: Conjunctivae normal.      Pupils: Pupils are equal, round, and reactive to light.   Neck:      Musculoskeletal: Full passive range of motion without pain and neck supple.      Thyroid: No thyromegaly.      Vascular: No JVD.      Trachea: No tracheal deviation.   Cardiovascular:      Rate and Rhythm: Normal rate and regular rhythm.      Chest Wall: PMI is not displaced.      Pulses: Normal pulses.      Heart sounds: Normal heart sounds.   Pulmonary:      Effort: Pulmonary effort is normal.      " Breath sounds: Normal breath sounds.   Abdominal:      General: Bowel sounds are normal.      Palpations: Abdomen is soft.      Tenderness: There is no abdominal tenderness. There is no guarding or rebound.   Musculoskeletal:         General: Tenderness and deformity present.      Comments: Antalgic gait   Skin:     General: Skin is warm and dry.      Findings: No rash.   Neurological:      General: No focal deficit present.      Mental Status: She is alert and oriented to person, place, and time.   Psychiatric:         Mood and Affect: Mood normal.         Behavior: Behavior normal.           Assessment:       1. Steroid-induced diabetes    2. Flu vaccine need    3. Mixed hyperlipidemia    4. Gastroesophageal reflux disease without esophagitis    5. Essential hypertension    6. Hypothyroidism (acquired)    7. Rheumatoid arthritis involving multiple sites, unspecified rheumatoid factor presence    8. Lumbar radiculopathy    9. Gout, unspecified cause, unspecified chronicity, unspecified site         Plan:       Steroid-induced diabetes  -     Hemoglobin A1C; Future; Expected date: 03/08/2021    Flu vaccine need  -     Influenza - Quadrivalent - High Dose (65+) (PF) (IM)    Mixed hyperlipidemia  -     Lipid Panel; Future; Expected date: 03/08/2021    Gastroesophageal reflux disease without esophagitis  -     omeprazole (PRILOSEC) 40 MG capsule; Take 1 capsule (40 mg total) by mouth once daily.  Dispense: 90 capsule; Refill: 1    Essential hypertension  -     carvediloL (COREG) 3.125 MG tablet; Take 1 tablet (3.125 mg total) by mouth 2 (two) times daily with meals.  Dispense: 180 tablet; Refill: 1  -     Comprehensive metabolic panel; Future; Expected date: 03/08/2021    Hypothyroidism (acquired)  -     levothyroxine (SYNTHROID) 100 MCG tablet; Take 1 tablet (100 mcg total) by mouth before breakfast.  Dispense: 90 tablet; Refill: 1  -     TSH w/reflex to FT4; Future; Expected date: 03/08/2021    Rheumatoid arthritis  involving multiple sites, unspecified rheumatoid factor presence    Lumbar radiculopathy  Comments:  mostly on the right  Orders:  -     gabapentin (NEURONTIN) 300 MG capsule; Take 1 capsule (300 mg total) by mouth 2 (two) times daily. For nerve pain  Dispense: 180 capsule; Refill: 1    Gout, unspecified cause, unspecified chronicity, unspecified site  -     allopurinoL (ZYLOPRIM) 300 MG tablet; Take 1 tablet (300 mg total) by mouth 2 (two) times daily.  Dispense: 90 tablet; Refill: 1      Follow up in about 6 months (around 3/24/2021) for HTN, Diabetic Check-Up.

## 2020-10-08 ENCOUNTER — TELEPHONE (OUTPATIENT)
Dept: RHEUMATOLOGY | Facility: CLINIC | Age: 70
End: 2020-10-08

## 2020-10-08 ENCOUNTER — LAB VISIT (OUTPATIENT)
Dept: LAB | Facility: HOSPITAL | Age: 70
End: 2020-10-08
Attending: INTERNAL MEDICINE
Payer: MEDICARE

## 2020-10-08 ENCOUNTER — OFFICE VISIT (OUTPATIENT)
Dept: RHEUMATOLOGY | Facility: CLINIC | Age: 70
End: 2020-10-08
Payer: MEDICARE

## 2020-10-08 VITALS
WEIGHT: 181 LBS | DIASTOLIC BLOOD PRESSURE: 82 MMHG | SYSTOLIC BLOOD PRESSURE: 127 MMHG | BODY MASS INDEX: 33.11 KG/M2 | TEMPERATURE: 98 F

## 2020-10-08 DIAGNOSIS — M06.9 RHEUMATOID ARTHRITIS, INVOLVING UNSPECIFIED SITE, UNSPECIFIED WHETHER RHEUMATOID FACTOR PRESENT: ICD-10-CM

## 2020-10-08 DIAGNOSIS — Z79.899 ENCOUNTER FOR LONG-TERM (CURRENT) USE OF MEDICATIONS: ICD-10-CM

## 2020-10-08 DIAGNOSIS — M17.10 OSTEOARTHRITIS OF KNEE, UNILATERAL: Primary | ICD-10-CM

## 2020-10-08 DIAGNOSIS — R79.89 ELEVATED LFTS: Primary | ICD-10-CM

## 2020-10-08 DIAGNOSIS — M06.9 RHEUMATOID ARTHRITIS, INVOLVING UNSPECIFIED SITE, UNSPECIFIED WHETHER RHEUMATOID FACTOR PRESENT: Primary | ICD-10-CM

## 2020-10-08 DIAGNOSIS — D64.9 ANEMIA, UNSPECIFIED TYPE: ICD-10-CM

## 2020-10-08 LAB
ALBUMIN SERPL BCP-MCNC: 3.7 G/DL (ref 3.5–5.2)
ALP SERPL-CCNC: 72 U/L (ref 55–135)
ALT SERPL W/O P-5'-P-CCNC: 48 U/L (ref 10–44)
ANION GAP SERPL CALC-SCNC: 12 MMOL/L (ref 8–16)
AST SERPL-CCNC: 61 U/L (ref 10–40)
BASOPHILS # BLD AUTO: 0.05 K/UL (ref 0–0.2)
BASOPHILS NFR BLD: 0.7 % (ref 0–1.9)
BILIRUB SERPL-MCNC: 0.7 MG/DL (ref 0.1–1)
BUN SERPL-MCNC: 20 MG/DL (ref 8–23)
CALCIUM SERPL-MCNC: 9.4 MG/DL (ref 8.7–10.5)
CHLORIDE SERPL-SCNC: 106 MMOL/L (ref 95–110)
CO2 SERPL-SCNC: 23 MMOL/L (ref 23–29)
CREAT SERPL-MCNC: 0.9 MG/DL (ref 0.5–1.4)
DIFFERENTIAL METHOD: ABNORMAL
EOSINOPHIL # BLD AUTO: 0.6 K/UL (ref 0–0.5)
EOSINOPHIL NFR BLD: 8 % (ref 0–8)
ERYTHROCYTE [DISTWIDTH] IN BLOOD BY AUTOMATED COUNT: 14.1 % (ref 11.5–14.5)
EST. GFR  (AFRICAN AMERICAN): >60 ML/MIN/1.73 M^2
EST. GFR  (NON AFRICAN AMERICAN): >60 ML/MIN/1.73 M^2
GLUCOSE SERPL-MCNC: 86 MG/DL (ref 70–110)
HCT VFR BLD AUTO: 31.4 % (ref 37–48.5)
HGB BLD-MCNC: 10.5 G/DL (ref 12–16)
IMM GRANULOCYTES # BLD AUTO: 0.03 K/UL (ref 0–0.04)
IMM GRANULOCYTES NFR BLD AUTO: 0.4 % (ref 0–0.5)
LYMPHOCYTES # BLD AUTO: 1.6 K/UL (ref 1–4.8)
LYMPHOCYTES NFR BLD: 22.3 % (ref 18–48)
MAGNESIUM SERPL-MCNC: 1.7 MG/DL (ref 1.6–2.6)
MCH RBC QN AUTO: 35.7 PG (ref 27–31)
MCHC RBC AUTO-ENTMCNC: 33.4 G/DL (ref 32–36)
MCV RBC AUTO: 107 FL (ref 82–98)
MONOCYTES # BLD AUTO: 0.4 K/UL (ref 0.3–1)
MONOCYTES NFR BLD: 5.3 % (ref 4–15)
NEUTROPHILS # BLD AUTO: 4.5 K/UL (ref 1.8–7.7)
NEUTROPHILS NFR BLD: 63.3 % (ref 38–73)
NRBC BLD-RTO: 0 /100 WBC
PLATELET # BLD AUTO: 367 K/UL (ref 150–350)
PMV BLD AUTO: 9.6 FL (ref 9.2–12.9)
POTASSIUM SERPL-SCNC: 3.8 MMOL/L (ref 3.5–5.1)
PROT SERPL-MCNC: 6.5 G/DL (ref 6–8.4)
RBC # BLD AUTO: 2.94 M/UL (ref 4–5.4)
SODIUM SERPL-SCNC: 141 MMOL/L (ref 136–145)
URATE SERPL-MCNC: 2.2 MG/DL (ref 2.4–5.7)
WBC # BLD AUTO: 7.03 K/UL (ref 3.9–12.7)

## 2020-10-08 PROCEDURE — 99213 OFFICE O/P EST LOW 20 MIN: CPT | Mod: S$GLB,,, | Performed by: INTERNAL MEDICINE

## 2020-10-08 PROCEDURE — 36415 COLL VENOUS BLD VENIPUNCTURE: CPT

## 2020-10-08 PROCEDURE — 80053 COMPREHEN METABOLIC PANEL: CPT

## 2020-10-08 PROCEDURE — 83735 ASSAY OF MAGNESIUM: CPT

## 2020-10-08 PROCEDURE — 99213 PR OFFICE/OUTPT VISIT, EST, LEVL III, 20-29 MIN: ICD-10-PCS | Mod: S$GLB,,, | Performed by: INTERNAL MEDICINE

## 2020-10-08 PROCEDURE — 85025 COMPLETE CBC W/AUTO DIFF WBC: CPT

## 2020-10-08 PROCEDURE — 84550 ASSAY OF BLOOD/URIC ACID: CPT

## 2020-10-08 NOTE — TELEPHONE ENCOUNTER
----- Message from Surendra Corona MD sent at 10/8/2020  1:51 PM CDT -----  Liver function tests are slightly elevated she is to hold methotrexate dose and repeat liver function tests in a week.  2) anemia.  This is slightly worse.  She needs to have colonoscopy if she has not had 1 recently.  At the very least she needs stool test for blood ,iron studies : TIBC ferritin and iron.  Repeat CBC in a week

## 2020-10-08 NOTE — PROGRESS NOTES
[facility] RHEUMATOLOGY            PROGRESS NOTE      Subjective:       Patient ID:   NAME: Lizette Palafox : 1950     70 y.o. female    Referring Doc: No ref. provider found  Other Physicians:    Chief Complaint:  Rheumatoid Arthritis      History of Present Illness:     Patient returns today for a regularly scheduled follow-up visit for    rheumatoid arthritis   The patient is following CDC guidelines to avoid COVID-19 infection.  No known exposure to COVID-19.  No prolonged morning stiffness or joint swelling.  She has muscle cramps in her legs for the past few weeks.  No fevers cough or shortness of breath.  No chest pains.  No GI complaints.  She is off prednisone  Chronic left knee pain          ROS:   GEN:  No  fever, night sweats . weight is stable   + fatigue  SKIN: no rashes, no bruising, no ulcerations, no Raynaud's  HEENT: no HA's, No visual changes, no mucosal ulcers, no sicca symptoms,  CV:   no CP, SOB, PND, ATKINSON, no orthopnea, no palpitations  PULM: normal with no SOB, cough, hemoptysis, sputum or pleuritic pain  GI:  no abdominal pain, nausea, vomiting, constipation, diarrhea, melanotic stools, BRBPR, hematemesis, no dysphagia  :   no dysuria  NEURO: no paresthesias, headaches, visual disturbances, muscle weakness  MUSCULOSKELETAL:no joint swelling, prolonged AM stiffness, no back pain, + muscle pain  Allergies:  Review of patient's allergies indicates:   Allergen Reactions    Levaquin [levofloxacin] Hives and Edema       Medications:    Current Outpatient Medications:     allopurinoL (ZYLOPRIM) 300 MG tablet, Take 1 tablet (300 mg total) by mouth 2 (two) times daily., Disp: 90 tablet, Rfl: 1    aspirin (ECOTRIN) 81 MG EC tablet, Take 81 mg by mouth once daily., Disp: , Rfl:     carvediloL (COREG) 3.125 MG tablet, Take 1 tablet (3.125 mg total) by mouth 2 (two) times daily with meals., Disp: 180 tablet, Rfl: 1    folic acid (FOLVITE) 1 MG tablet, Take 1 mg by mouth once daily., Disp: ,  Rfl:     gabapentin (NEURONTIN) 300 MG capsule, Take 1 capsule (300 mg total) by mouth 2 (two) times daily. For nerve pain, Disp: 180 capsule, Rfl: 1    levothyroxine (SYNTHROID) 100 MCG tablet, Take 1 tablet (100 mcg total) by mouth before breakfast., Disp: 90 tablet, Rfl: 1    metFORMIN (GLUCOPHAGE-XR) 500 MG XR 24hr tablet, Take 1 tablet (500 mg total) by mouth daily with breakfast., Disp: 90 tablet, Rfl: 3    methotrexate 2.5 MG Tab, Take 8 tablets (20 mg total) by mouth every 7 days., Disp: 32 tablet, Rfl: 5    omeprazole (PRILOSEC) 40 MG capsule, Take 1 capsule (40 mg total) by mouth once daily., Disp: 90 capsule, Rfl: 1    pravastatin (PRAVACHOL) 40 MG tablet, Take 1 tablet (40 mg total) by mouth once daily., Disp: 90 tablet, Rfl: 3    predniSONE (DELTASONE) 10 MG tablet, Take 1 tablet (10 mg total) by mouth once daily., Disp: 90 tablet, Rfl: 0    traMADoL (ULTRAM) 50 mg tablet, One po am prn, 2 po pm prn, Disp: 90 tablet, Rfl: 2    valsartan (DIOVAN) 160 MG tablet, Take 1 tablet (160 mg total) by mouth once daily., Disp: 90 tablet, Rfl: 3    vitamin D (VITAMIN D3) 1000 units Tab, Take 2,000 Units by mouth once daily. , Disp: , Rfl:     PMHx/PSHx Updates:        Objective:     Vitals:  Blood pressure 127/82, temperature 98 °F (36.7 °C), weight 82.1 kg (181 lb).    Physical Examination:   GEN: no apparent distress, comfortable; AAOx3  SKIN: no rashes,no ulceration, no Raynaud's, no petechiae, no SQ nodules,  HEAD: normal  EYES: no pallor, no icterus,   NECK: no masses, thyroid normal, trachea midline, no LAD/LN's, supple  CV: RRR with no murmur; l S1 and S2 reg. ,no gallop no rubs,   CHEST: Normal respiratory effort; CTAB; normal breath sounds; no wheeze or crackles  ABDOM: nontender and nondistended; soft; no masses; no rebound/guarding  MUSC/Skeletal: ROM normal; no crepitus; joints without synovitis,   No joint swelling or tenderness of PIP, MCP, wrist, elbow, shoulder, or knee joints  EXTREM: no  clubbing, cyanosis, no edema,normal  pulses   NEURO: grossly intact; motor WNL; AAOx3; ,  PSYCH: normal mood, affect and behavior  LYMPH: normal cervical, supraclavicular          Labs:   Lab Results   Component Value Date    WBC 10.11 08/17/2020    HGB 11.6 (L) 08/17/2020    HCT 34.8 (L) 08/17/2020     (H) 08/17/2020     08/17/2020    CMP  @LASTLAB(NA,K,CL,CO2,GLU,BUN,Creatinine,Calcium,PROT,Albumin,Bilitot,Alkphos,AST,ALT,CRP,ESR,RF,CCP,MALINI,SSA,CPK,uric acid) )@  I have reviewed all available lab results and radiology reports.    Radiology/Diagnostic Studies:        Assessment/Plan:   (1) 70 y.o. female with diagnosis of rheumatoid arthritis.  This is stable.  She is off prednisone  Muscle cramps.  Will check CMP and magnesium levels.  2) osteoarthritis more symptomatic in left knee.  Bone on bone on the medial aspect.    Follow-up in 3 months  Refer to Dr. Love      Discussion:     I have explained all of the above in detail and the patient understands all of the current recommendation(s). I have answered all questions to the best of my ability and to their complete satisfaction.       The patient is to continue with the current management plan         RTC in         Electronically signed by Surendra Corona MD

## 2020-10-08 NOTE — TELEPHONE ENCOUNTER
Pt notified of results. Instructed to hold MTX. Get repeat LFT's nest week. Anemia slightly worse, need colonoscopy if due. Pt states she had one a few years ago. Cbc, iron, ferritin,tibc, stool o blood, and lft's ordered.  Pt verbalized understanding of all.

## 2020-10-20 ENCOUNTER — TELEPHONE (OUTPATIENT)
Dept: ORTHOPEDICS | Facility: CLINIC | Age: 70
End: 2020-10-20

## 2020-10-23 ENCOUNTER — OFFICE VISIT (OUTPATIENT)
Dept: ORTHOPEDICS | Facility: CLINIC | Age: 70
End: 2020-10-23
Payer: MEDICARE

## 2020-10-23 ENCOUNTER — LAB VISIT (OUTPATIENT)
Dept: LAB | Facility: HOSPITAL | Age: 70
End: 2020-10-23
Attending: INTERNAL MEDICINE
Payer: MEDICARE

## 2020-10-23 VITALS — HEIGHT: 62 IN | WEIGHT: 181 LBS | BODY MASS INDEX: 33.31 KG/M2 | RESPIRATION RATE: 16 BRPM

## 2020-10-23 DIAGNOSIS — Z79.899 ENCOUNTER FOR LONG-TERM (CURRENT) USE OF MEDICATIONS: ICD-10-CM

## 2020-10-23 DIAGNOSIS — D64.9 ANEMIA, UNSPECIFIED TYPE: ICD-10-CM

## 2020-10-23 DIAGNOSIS — M17.12 PRIMARY OSTEOARTHRITIS OF LEFT KNEE: Primary | ICD-10-CM

## 2020-10-23 DIAGNOSIS — R79.89 ELEVATED LFTS: ICD-10-CM

## 2020-10-23 DIAGNOSIS — Z01.818 PRE-OP TESTING: ICD-10-CM

## 2020-10-23 DIAGNOSIS — M17.10 OSTEOARTHRITIS OF KNEE, UNILATERAL: ICD-10-CM

## 2020-10-23 LAB
ALBUMIN SERPL BCP-MCNC: 3.5 G/DL (ref 3.5–5.2)
ALP SERPL-CCNC: 66 U/L (ref 55–135)
ALT SERPL W/O P-5'-P-CCNC: 9 U/L (ref 10–44)
AST SERPL-CCNC: 17 U/L (ref 10–40)
BASOPHILS # BLD AUTO: 0.14 K/UL (ref 0–0.2)
BASOPHILS NFR BLD: 1.2 % (ref 0–1.9)
BILIRUB DIRECT SERPL-MCNC: 0.1 MG/DL (ref 0.1–0.3)
BILIRUB SERPL-MCNC: 0.6 MG/DL (ref 0.1–1)
DIFFERENTIAL METHOD: ABNORMAL
EOSINOPHIL # BLD AUTO: 0.9 K/UL (ref 0–0.5)
EOSINOPHIL NFR BLD: 7.8 % (ref 0–8)
ERYTHROCYTE [DISTWIDTH] IN BLOOD BY AUTOMATED COUNT: 13.6 % (ref 11.5–14.5)
FERRITIN SERPL-MCNC: 242 NG/ML (ref 20–300)
HCT VFR BLD AUTO: 31.8 % (ref 37–48.5)
HGB BLD-MCNC: 10.4 G/DL (ref 12–16)
IMM GRANULOCYTES # BLD AUTO: 0.2 K/UL (ref 0–0.04)
IMM GRANULOCYTES NFR BLD AUTO: 1.7 % (ref 0–0.5)
IRON SERPL-MCNC: 90 UG/DL (ref 30–160)
LYMPHOCYTES # BLD AUTO: 1.8 K/UL (ref 1–4.8)
LYMPHOCYTES NFR BLD: 15.3 % (ref 18–48)
MCH RBC QN AUTO: 34.9 PG (ref 27–31)
MCHC RBC AUTO-ENTMCNC: 32.7 G/DL (ref 32–36)
MCV RBC AUTO: 107 FL (ref 82–98)
MONOCYTES # BLD AUTO: 0.9 K/UL (ref 0.3–1)
MONOCYTES NFR BLD: 7.9 % (ref 4–15)
NEUTROPHILS # BLD AUTO: 7.7 K/UL (ref 1.8–7.7)
NEUTROPHILS NFR BLD: 66.1 % (ref 38–73)
NRBC BLD-RTO: 0 /100 WBC
PLATELET # BLD AUTO: 458 K/UL (ref 150–350)
PMV BLD AUTO: 9.3 FL (ref 9.2–12.9)
PROT SERPL-MCNC: 6.7 G/DL (ref 6–8.4)
RBC # BLD AUTO: 2.98 M/UL (ref 4–5.4)
SATURATED IRON: 36 % (ref 20–50)
TOTAL IRON BINDING CAPACITY: 249 UG/DL (ref 250–450)
TRANSFERRIN SERPL-MCNC: 178 MG/DL (ref 200–375)
WBC # BLD AUTO: 11.59 K/UL (ref 3.9–12.7)

## 2020-10-23 PROCEDURE — 36415 COLL VENOUS BLD VENIPUNCTURE: CPT

## 2020-10-23 PROCEDURE — 85025 COMPLETE CBC W/AUTO DIFF WBC: CPT

## 2020-10-23 PROCEDURE — 99999 PR PBB SHADOW E&M-EST. PATIENT-LVL IV: ICD-10-PCS | Mod: PBBFAC,,, | Performed by: ORTHOPAEDIC SURGERY

## 2020-10-23 PROCEDURE — 99203 PR OFFICE/OUTPT VISIT, NEW, LEVL III, 30-44 MIN: ICD-10-PCS | Mod: S$PBB,,, | Performed by: ORTHOPAEDIC SURGERY

## 2020-10-23 PROCEDURE — 99203 OFFICE O/P NEW LOW 30 MIN: CPT | Mod: S$PBB,,, | Performed by: ORTHOPAEDIC SURGERY

## 2020-10-23 PROCEDURE — 82728 ASSAY OF FERRITIN: CPT

## 2020-10-23 PROCEDURE — 83540 ASSAY OF IRON: CPT

## 2020-10-23 PROCEDURE — 99214 OFFICE O/P EST MOD 30 MIN: CPT | Mod: PBBFAC,PN | Performed by: ORTHOPAEDIC SURGERY

## 2020-10-23 PROCEDURE — 99999 PR PBB SHADOW E&M-EST. PATIENT-LVL IV: CPT | Mod: PBBFAC,,, | Performed by: ORTHOPAEDIC SURGERY

## 2020-10-23 PROCEDURE — 80076 HEPATIC FUNCTION PANEL: CPT

## 2020-10-23 RX ORDER — TRANEXAMIC ACID 100 MG/ML
1000 INJECTION, SOLUTION INTRAVENOUS
Status: CANCELLED | OUTPATIENT
Start: 2020-10-23

## 2020-10-23 RX ORDER — MUPIROCIN 20 MG/G
OINTMENT TOPICAL
Status: CANCELLED | OUTPATIENT
Start: 2020-10-23

## 2020-10-23 RX ORDER — CEFAZOLIN SODIUM 2 G/50ML
2 SOLUTION INTRAVENOUS
Status: CANCELLED | OUTPATIENT
Start: 2020-10-23

## 2020-10-23 NOTE — LETTER
October 23, 2020      Surendra Corona MD  [address]  [address]  [place] [address]           [facility]  [place] [address]  Phone: [phone]          Patient: Lizette Palafox   MR Number: [id number]   YOB: 1950   Date of Visit: 10/23/2020       Dear Dr. Surendra Corona:    Thank you for referring Lizette Palafox to me for evaluation. Attached you will find relevant portions of my assessment and plan of care.    If you have questions, please do not hesitate to call me. I look forward to following Lizette Palafox along with you.    Sincerely,    Tito Love MD    Enclosure  CC:  No Recipients    If you would like to receive this communication electronically, please contact [email or web address] or [phone] to request more information on [employer] access.    For providers and/or their staff who would like to refer a patient to [facility], please contact us through our one-stop-shop provider referral line, [facility], at [phone].    If you feel you have received this communication in error or would no longer like to receive these types of communications, please e-mail [email or web address]

## 2020-10-23 NOTE — PROGRESS NOTES
10/23/2020      Past Medical History:   Diagnosis Date    Arthritis     Rheumatoid    Encounter for blood transfusion     Gout     Hypertension     Thyroid disease        Past Surgical History:   Procedure Laterality Date    APPENDECTOMY       SECTION      x3    DILATION AND CURETTAGE OF UTERUS      GALLBLADDER SURGERY      TOTAL HIP ARTHROPLASTY           Current Outpatient Medications:     allopurinoL (ZYLOPRIM) 300 MG tablet, Take 1 tablet (300 mg total) by mouth 2 (two) times daily., Disp: 90 tablet, Rfl: 1    aspirin (ECOTRIN) 81 MG EC tablet, Take 81 mg by mouth once daily., Disp: , Rfl:     carvediloL (COREG) 3.125 MG tablet, Take 1 tablet (3.125 mg total) by mouth 2 (two) times daily with meals., Disp: 180 tablet, Rfl: 1    folic acid (FOLVITE) 1 MG tablet, Take 1 mg by mouth once daily., Disp: , Rfl:     gabapentin (NEURONTIN) 300 MG capsule, Take 1 capsule (300 mg total) by mouth 2 (two) times daily. For nerve pain, Disp: 180 capsule, Rfl: 1    levothyroxine (SYNTHROID) 100 MCG tablet, Take 1 tablet (100 mcg total) by mouth before breakfast., Disp: 90 tablet, Rfl: 1    metFORMIN (GLUCOPHAGE-XR) 500 MG XR 24hr tablet, Take 1 tablet (500 mg total) by mouth daily with breakfast., Disp: 90 tablet, Rfl: 3    methotrexate 2.5 MG Tab, Take 8 tablets (20 mg total) by mouth every 7 days., Disp: 32 tablet, Rfl: 5    omeprazole (PRILOSEC) 40 MG capsule, Take 1 capsule (40 mg total) by mouth once daily., Disp: 90 capsule, Rfl: 1    pravastatin (PRAVACHOL) 40 MG tablet, Take 1 tablet (40 mg total) by mouth once daily., Disp: 90 tablet, Rfl: 3    predniSONE (DELTASONE) 10 MG tablet, Take 1 tablet (10 mg total) by mouth once daily., Disp: 90 tablet, Rfl: 0    traMADoL (ULTRAM) 50 mg tablet, One po am prn, 2 po pm prn, Disp: 90 tablet, Rfl: 2    valsartan (DIOVAN) 160 MG tablet, Take 1 tablet (160 mg total) by mouth once daily., Disp: 90 tablet, Rfl: 3    vitamin D (VITAMIN D3) 1000 units  Tab, Take 2,000 Units by mouth once daily. , Disp: , Rfl:     Allergies as of 10/23/2020 - Reviewed 10/23/2020   Allergen Reaction Noted    Levaquin [levofloxacin] Hives and Edema 01/16/2020       History reviewed. No pertinent family history.    Social History     Socioeconomic History    Marital status: Legally      Spouse name: Not on file    Number of children: Not on file    Years of education: Not on file    Highest education level: Not on file   Occupational History    Not on file   Social Needs    Financial resource strain: Not on file    Food insecurity     Worry: Not on file     Inability: Not on file    Transportation needs     Medical: Not on file     Non-medical: Not on file   Tobacco Use    Smoking status: Never Smoker    Smokeless tobacco: Never Used   Substance and Sexual Activity    Alcohol use: Not Currently     Frequency: Never    Drug use: Never    Sexual activity: Not on file   Lifestyle    Physical activity     Days per week: Not on file     Minutes per session: Not on file    Stress: Not on file   Relationships    Social connections     Talks on phone: Not on file     Gets together: Not on file     Attends Sikh service: Not on file     Active member of club or organization: Not on file     Attends meetings of clubs or organizations: Not on file     Relationship status: Not on file   Other Topics Concern    Not on file   Social History Narrative    Not on file             HPI:  Patient has a history of chronic left knee pain has no history of any recent injury she complains of pain in the left knee      Review of Systems   Constitution: Negative for chills and fever.   HENT: Negative for headaches and blurry vision.   Cardiovascular: Negative for chest pain, irregular heartbeat, leg swelling and palpitations.   Respiratory: Negative for cough and shortness of breath.   Endocrine: Negative for polyuria.   Hematologic/Lymphatic: Negative for bleeding problem. Does  not bruise/bleed easily.   Skin: Negative for poor wound healing and rash.   Musculoskeletal: Negative for joint pain. Negative for arthritis, joint swelling, muscle weakness and myalgias.   Gastrointestinal: Negative for abdominal pain, heartburn, melena, nausea and vomiting.   Genitourinary: Negative for bladder incontinence and dysuria.   Neurological: Negative for numbness.   Psychiatric/Behavioral: Negative for depression. The patient is not nervous/anxious.     PE:  [unfilled]    A&Ox3, NAD  Neck-supple with no pain  RUE no swelling deformity  LUE swelling deformity  Pelvis no pelvic pain  Back no back pain straight leg raise negative  RLE no swelling or deformity  LLE patient has a varus knee in the left knee she has almost full extension with about 110° of flexion she has severe medial pain    Xrays:  X-ray of the left knee show severe osteoarthritis with severe varus alignment        Labs:    No results found for this or any previous visit (from the past 24 hour(s)).    Assessment/Plan:   Severe osteoarthritis of the left knee patient wishes to proceed with total knee replacement will schedule her

## 2020-10-27 ENCOUNTER — TELEPHONE (OUTPATIENT)
Dept: FAMILY MEDICINE | Facility: CLINIC | Age: 70
End: 2020-10-27

## 2020-10-27 NOTE — TELEPHONE ENCOUNTER
----- Message from Kristen Hogan LPN sent at 10/23/2020  1:41 PM CDT -----  Regarding: Surgical Clearance Request  Good afternoon,     The attached patient, Ms. Lizette Palafox is scheduled for a Left Total Knee Arthroplasty on November 30, 2020 at [facility]. Patient states she is currently taking the following medications: Valsartan 160 mg, Carvedilol 3.125 mg, and Metformin 500 mg. Please send surgical clearance and instructions on the above medications to [phone].     Kind Regards,     Physician: Tito Love MD  Contact: Kristen Hogan LPN  Phone: [phone]   Fax: [phone]

## 2020-11-05 ENCOUNTER — HOSPITAL ENCOUNTER (EMERGENCY)
Facility: HOSPITAL | Age: 70
Discharge: HOME | End: 2020-11-05
Attending: EMERGENCY MEDICINE
Payer: MEDICARE

## 2020-11-05 VITALS
HEART RATE: 79 BPM | TEMPERATURE: 99 F | BODY MASS INDEX: 32.02 KG/M2 | HEIGHT: 62 IN | DIASTOLIC BLOOD PRESSURE: 52 MMHG | SYSTOLIC BLOOD PRESSURE: 103 MMHG | OXYGEN SATURATION: 95 % | RESPIRATION RATE: 18 BRPM | WEIGHT: 174 LBS

## 2020-11-05 DIAGNOSIS — M25.511 RIGHT SHOULDER PAIN: ICD-10-CM

## 2020-11-05 DIAGNOSIS — S09.90XA CLOSED HEAD INJURY, INITIAL ENCOUNTER: ICD-10-CM
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
## 2020-11-05 DIAGNOSIS — M53.3 COCCYX PAIN: ICD-10-CM

## 2020-11-05 DIAGNOSIS — M25.551 RIGHT HIP PAIN: ICD-10-CM

## 2020-11-05 DIAGNOSIS — M25.561 RIGHT KNEE PAIN: ICD-10-CM

## 2020-11-05 DIAGNOSIS — M25.531 RIGHT WRIST PAIN: ICD-10-CM

## 2020-11-05 DIAGNOSIS — M25.521 RIGHT ELBOW PAIN: ICD-10-CM

## 2020-11-05 DIAGNOSIS — S39.012A LUMBAR STRAIN, INITIAL ENCOUNTER: Primary | ICD-10-CM

## 2020-11-05 DIAGNOSIS — M25.562 LEFT KNEE PAIN: ICD-10-CM

## 2020-11-05 PROBLEM — M54.50 LOW BACK PAIN: Status: ACTIVE | Noted: 2020-11-05

## 2020-11-05 PROCEDURE — 99284 EMERGENCY DEPT VISIT MOD MDM: CPT | Mod: 25

## 2020-11-05 PROCEDURE — 96374 THER/PROPH/DIAG INJ IV PUSH: CPT

## 2020-11-05 PROCEDURE — 96375 TX/PRO/DX INJ NEW DRUG ADDON: CPT

## 2020-11-05 PROCEDURE — 63600175 PHARM REV CODE 636 W HCPCS: Performed by: EMERGENCY MEDICINE

## 2020-11-05 RX ORDER — ONDANSETRON 2 MG/ML
4 INJECTION INTRAMUSCULAR; INTRAVENOUS
Status: COMPLETED | OUTPATIENT
Start: 2020-11-05 | End: 2020-11-05

## 2020-11-05 RX ORDER — HYDROMORPHONE HYDROCHLORIDE 1 MG/ML
0.5 INJECTION, SOLUTION INTRAMUSCULAR; INTRAVENOUS; SUBCUTANEOUS
Status: COMPLETED | OUTPATIENT
Start: 2020-11-05 | End: 2020-11-05

## 2020-11-05 RX ORDER — METHOCARBAMOL 500 MG/1
500 TABLET, FILM COATED ORAL 3 TIMES DAILY
Qty: 15 TABLET | Refills: 0 | Status: SHIPPED | OUTPATIENT
Start: 2020-11-05 | End: 2020-11-16 | Stop reason: SDUPTHER

## 2020-11-05 RX ADMIN — ONDANSETRON 4 MG: 2 INJECTION, SOLUTION INTRAMUSCULAR; INTRAVENOUS at 07:11

## 2020-11-05 RX ADMIN — HYDROMORPHONE HYDROCHLORIDE 0.5 MG: 1 INJECTION, SOLUTION INTRAMUSCULAR; INTRAVENOUS; SUBCUTANEOUS at 07:11

## 2020-11-06 NOTE — ED TRIAGE NOTES
Present to the ER via POV, reports falling down 3 steps pta, neg LOC, reports falling on R sided body, c/o R shoulder, R upper back, R elbow, R wrist, R hip, R knee, L knee pain, reports she was assisted up from floor by neighbor and daughter, arrived via WC, reports Hx: R hip surgery, no shortening, no rotation to R foot

## 2020-11-09 NOTE — ED PROVIDER NOTES
Encounter Date: 2020       History     Chief Complaint   Patient presents with    Back Pain     S/P FALL DOWN FEW STEPS NO LOC    HEAD AND NECK PAIN     70-year-old female with history of hypertension, rheumatoid arthritis, hypothyroidism, GERD presents to the ER for evaluation of a fall down several steps with head neck and back pain.  Patient reports that she was walking down seven steps tripped and fell backward falling on to her buttocks back in his striking her posterior head.  She reports she is on 81 mg of aspirin daily no blood thinners.  She denies loss of consciousness or amnesia she reports she does have a posterior headache neck and back pain no chest pain shortness of breath she did fall onto the knees bilaterally and is having bilateral knee pain with abrasion right shoulder, elbow pain in right wrist pain as well as right hip pain        Review of patient's allergies indicates:   Allergen Reactions    Levaquin [levofloxacin] Hives and Edema     Past Medical History:   Diagnosis Date    Arthritis     Rheumatoid    Encounter for blood transfusion     GERD (gastroesophageal reflux disease)     Gout     Hypertension     Thyroid disease      Past Surgical History:   Procedure Laterality Date    APPENDECTOMY       SECTION      x3    DILATION AND CURETTAGE OF UTERUS      GALLBLADDER SURGERY      TOTAL HIP ARTHROPLASTY       No family history on file.  Social History     Tobacco Use    Smoking status: Never Smoker    Smokeless tobacco: Never Used   Substance Use Topics    Alcohol use: Not Currently     Frequency: Never    Drug use: Never     Review of Systems   Constitutional: Negative for activity change, appetite change, chills, diaphoresis, fatigue and fever.   HENT: Negative for congestion, postnasal drip, rhinorrhea and sore throat.    Respiratory: Negative for cough, chest tightness, shortness of breath, wheezing and stridor.    Cardiovascular: Negative for chest pain and  palpitations.   Gastrointestinal: Negative for abdominal distention, abdominal pain, blood in stool, constipation, diarrhea, nausea and vomiting.   Genitourinary: Negative for dysuria, flank pain, frequency, hematuria and urgency.   Musculoskeletal: Positive for arthralgias, back pain, myalgias and neck pain. Negative for gait problem, joint swelling and neck stiffness.   Skin: Negative for rash and wound.   Neurological: Positive for headaches. Negative for dizziness, syncope, weakness and light-headedness.   Hematological: Does not bruise/bleed easily.   Psychiatric/Behavioral: Negative for confusion. The patient is not nervous/anxious.    All other systems reviewed and are negative.      Physical Exam     Initial Vitals [11/05/20 1714]   BP Pulse Resp Temp SpO2   131/62 80 20 98.6 °F (37 °C) 96 %      MAP       --         Physical Exam    Nursing note and vitals reviewed.  Constitutional: She appears well-developed and well-nourished. She is not diaphoretic. No distress.   HENT:   Head: Normocephalic and atraumatic.   Right Ear: External ear normal.   Left Ear: External ear normal.   Nose: Nose normal.   Mouth/Throat: Oropharynx is clear and moist.   Eyes: Conjunctivae and EOM are normal. Pupils are equal, round, and reactive to light.   Neck: Normal range of motion. Neck supple. No tracheal deviation present.   Cardiovascular: Normal rate, regular rhythm, normal heart sounds and intact distal pulses. Exam reveals no gallop and no friction rub.    No murmur heard.  Pulmonary/Chest: Breath sounds normal. No stridor. No respiratory distress. She has no wheezes. She has no rhonchi. She has no rales. She exhibits no tenderness.   Abdominal: Soft. Bowel sounds are normal. She exhibits no distension and no mass. There is no abdominal tenderness. There is no rebound and no guarding.   Musculoskeletal: Normal range of motion. Tenderness and edema present.      Comments: Tenderness bilateral knees no open wounds full  range of motion of both knees but with pain.  Tenderness is over patellas bilaterally.  Patient has tenderness to the right hip and reports right hip replacement in past.  But she does have full range of motion of the right hip.    She has tenderness on palpation of the right wrist with swelling of the distal radius, tenderness on palpation of the mediolateral epicondyles of the right elbow and the entire right shoulder      Patient has tenderness to the cervical spine over the midline no step-off or deformity, tenderness to lumbar spine and coccyx   Neurological: She is alert and oriented to person, place, and time. She has normal strength. No cranial nerve deficit or sensory deficit.   Skin: Skin is warm and dry. No rash noted. No erythema. No pallor.   Psychiatric: She has a normal mood and affect. Her behavior is normal. Judgment and thought content normal.         ED Course   Procedures  Labs Reviewed - No data to display       Imaging Results          X-Ray Sacrum And Coccyx (Final result)  Result time 11/05/20 20:24:33    Final result by Edgar Zimmer MD (11/05/20 20:24:33)                 Impression:      No acute abnormality of the sacrum or coccyx.      Electronically signed by: Edgar Zimmer MD  Date:    11/05/2020  Time:    20:24             Narrative:    EXAMINATION:  XR SACRUM AND COCCYX    CLINICAL HISTORY:  Sacrococcygeal disorders, not elsewhere classified    FINDINGS:  Four views of the sacrum and coccyx show no acute fracture or destructive osseous lesion.  Sacroiliac joints are maintained.    Right hip arthroplasty partially visualized along with degenerative changes of left hip and lower lumbar spine.                    ED Interpretation by Dayan Abdul MD (11/05/20 20:22:33, [facility], Emergency Medicine)    No fracture dislocation or subluxation                             X-Ray Knee Complete 4 or more Views Bilat (Final result)  Result time 11/05/20 20:24:41   Procedure  changed from X-Ray Knee 3 View Left     ED Interpretation by Dayan Abdul MD (11/05/20 20:24:41, [facility], Emergency Medicine)    No fracture dislocation of either knee severe arthritis of the left knee moderate arthritis of the right knee                  Final result by Edgar Zimmer MD (11/05/20 20:23:46)                 Impression:      No acute abnormality of bilateral knees.      Electronically signed by: Edgar Zimmer MD  Date:    11/05/2020  Time:    20:23             Narrative:    EXAMINATION:  XR KNEE COMP 4 OR MORE VIEWS BILAT    CLINICAL HISTORY:  pain; Pain in left knee    FINDINGS:  Four views of bilateral knees show no acute fracture or dislocation.  Corticated ossicle along medial aspect of left knee medial compartment is unchanged since 08/17/2020.    Severe tricompartmental left knee osteoarthrosis again evident with mild genu varum.    Moderate tricompartmental right knee osteoarthrosis again evident.    Negative for right or left knee joint effusions.                               X-Ray Lumbar Spine Complete 5 View (Final result)  Result time 11/05/20 20:24:23    ED Interpretation by Dayan Abdul MD (11/05/20 20:24:23, [facility], Emergency Medicine)    No fracture or dislocation or subluxation of lumbar spine                  Final result by Edgar Zimmer MD (11/05/20 20:22:05)                 Impression:      No acute lumbar spine abnormality.      Electronically signed by: Edgar Zimmer MD  Date:    11/05/2020  Time:    20:22             Narrative:    EXAMINATION:  XR LUMBAR SPINE COMPLETE 5 VIEW    CLINICAL HISTORY:  Back pain or radiculopathy, trauma;    FINDINGS:  Five views of lumbar spine show normal lumbosacral alignment. No fracture or destructive osseous lesion.    Moderate degenerative spondylosis affects L3-L4, L4-L5, and L5-S1.  Facet joint osteoarthrosis also occurs at L3-L4, L4-L5, and L5-S1.  Minor degenerative spondylosis occurs at  L2-L3.    Sacroiliac joints are normal. Paraspinal soft tissues are negative.                               X-Ray Shoulder Trauma Right (Final result)  Result time 11/05/20 20:20:35    Final result by Edgar Zimmer MD (11/05/20 20:20:35)                 Impression:      Negative right shoulder.      Electronically signed by: Edgar iZmmer MD  Date:    11/05/2020  Time:    20:20             Narrative:    EXAMINATION:  XR SHOULDER TRAUMA 3 VIEW RIGHT    CLINICAL HISTORY:  Pain in right shoulder    FINDINGS:  Three views of right shoulder show no fracture, dislocation, or destructive osseous lesion. Soft tissues are unremarkable.                               X-Ray Wrist Complete Right (Final result)  Result time 11/05/20 20:19:36    Final result by Edgar Zimmer MD (11/05/20 20:19:36)                 Impression:      No acute right wrist abnormality.      Electronically signed by: Edgar Zimmer MD  Date:    11/05/2020  Time:    20:19             Narrative:    EXAMINATION:  XR WRIST COMPLETE 3 VIEWS RIGHT    CLINICAL HISTORY:  Pain in right wrist    FINDINGS:  Four views of right wrist show no fracture, dislocation, or destructive osseous lesion. Severe osteoarthrosis affects the right 1st CMC joint with mild right triscaphe joint osteoarthrosis.  Soft tissues are unremarkable.                               X-Ray Pelvis 3 view inc Hip 2 view Right (Final result)  Result time 11/05/20 20:18:32   Procedure changed from X-Ray Hip 2 View Right     Final result by Edgar Zimmer MD (11/05/20 20:18:32)                 Impression:      No acute right hip abnormality.      Electronically signed by: Edgar Zimmer MD  Date:    11/05/2020  Time:    20:18             Narrative:    EXAMINATION:  XR PELVIS 3 VIEW INC HIP 2 VIEW RIGHT    CLINICAL HISTORY:  PAIN; Pain in right hip    FINDINGS:  AP pelvis and two views of right hip show right total hip arthroplasty in place without periprosthetic fracture or loosening.  No acute fracture  or dislocation.  Moderate to severe left hip osteoarthrosis is present.  Moderate degenerative spondylosis affects the lower lumbar intervertebral disc levels at L3-L4, L4-L5, and L5-S1.  Sacroiliac joints and pubic symphysis are maintained.  Soft tissues are unremarkable.                               X-Ray Elbow Complete Right (Final result)  Result time 11/05/20 20:17:31    Final result by Edgar Zimmer MD (11/05/20 20:17:31)                 Impression:      No acute right elbow abnormality.      Electronically signed by: Edgar Zimmer MD  Date:    11/05/2020  Time:    20:17             Narrative:    EXAMINATION:  XR ELBOW COMPLETE 3 VIEW RIGHT    CLINICAL HISTORY:  Pain in right elbow    FINDINGS:  Four views of right elbow show no fracture, dislocation, or destructive osseous lesion. Soft tissues are unremarkable. Suboptimal positioning on lateral view limits detection of elbow joint effusion.  No definite elbow joint effusion or other soft tissue abnormality identified.  Corticated ossicle along medial aspect of right elbow suggest chronic dystrophic soft tissue calcification, incidentally noted.                               CT Cervical Spine Without Contrast (Final result)  Result time 11/05/20 19:01:46    Final result by Edgar Zimmer MD (11/05/20 19:01:46)                 Narrative:    CMS MANDATED QUALITY DATA - CT RADIATION - 436    All CT scans at this facility utilize dose modulation, iterative  reconstruction, and/or weight based dosing when appropriate to reduce  radiation dose to as low as reasonably achievable.        Reason: Neck trauma (Age => 64y)  Neck pain, recent trauma HEAD AND NECK PAIN (S/P FALL DOWN FEW STEPS  NO LOC)    TECHNIQUE: Cervical spine CT without IV contrast obtained with coronal  and sagittal reformations.    COMPARISON:None    FINDINGS:  Negative for fracture. No epidural hematoma or prevertebral soft  tissue swelling.    Cervical soft tissues are unremarkable. Visualized lung  apices are  clear.    Advanced right temporomandibular joint osteoarthrosis is present.    At C2-C3, severe right facet joint osteoarthrosis results in minor  right neural foramen narrowing.    At C3-C4 severe left facet joint osteoarthrosis causes minor left  neural foramen narrowing.    At C4-C5, severe left facet joint osteoarthrosis and left  uncovertebral joint osteophyte result in moderate left neural foramen  narrowing.    At C5-C6, posterior osteophytic ridge results in moderate central  canal narrowing with severe right and moderate left neural foramen  narrowing also evident.    At C6-C7, minor posterior osteophytic ridge results in mild central  canal and moderate right and mild left neural foramen narrowing.    At C7-T1, mild bilateral facet joint osteoarthrosis is present.    Coronal and sagittal reformation show no abnormal facet widening. 2 mm  anterolisthesis of C4 on C5 occur. Cervical alignment is otherwise  maintained.    IMPRESSION:  Cervical spine degenerative changes, without acute abnormality.    Electronically Signed by Edgar Zimmer M.D. on 11/5/2020 7:22 PM                             CT Head Without Contrast (Final result)  Result time 11/05/20 19:00:36    Final result by Edgar Zimmer MD (11/05/20 19:00:36)                 Narrative:    CMS MANDATED QUALITY DATA - CT RADIATION - 436    All CT scans at this facility utilize dose modulation, iterative  reconstruction, and/or weight based dosing when appropriate to reduce  radiation dose to as low as reasonably achievable.          Reason: Head trauma, minor (Age => 64y)  Pain headache (784.0) HEAD AND NECK PAIN (S/P FALL DOWN FEW STEPS NO  LOC)    TECHNIQUE: Head CT without IV contrast.    COMPARISON: None    FINDINGS:  Gray-white differentiation is maintained without hemorrhage, midline  shift, or mass effect.    The ventricles and cisterns are maintained.    Calvarium is intact. Diffuse mucosal thickening is moderate throughout  bilateral  frontal, ethmoid, sphenoid, and maxillary sinuses. Mastoids  and middle ears are clear. Advanced right temporomandibular joint  osteoarthrosis is present.    IMPRESSION:    1. No acute intracranial abnormality.  2. Diffuse paranasal sinus disease.    Electronically Signed by Edgar Zimmer M.D. on 11/5/2020 7:18 PM                            X-Rays:   Independently Interpreted Readings:   Other Readings:  X-ray right wrist reveals no fracture dislocation    X-ray right elbow reveals no fracture dislocation    X-ray right shoulder reveals no fracture dislocation    X-ray of sacrum and coccyx reveals no fracture dislocation or subluxation    X-ray of bilateral knees no fracture dislocation    Medical Decision Making:   Clinical Tests:   Radiological Study: Ordered and Reviewed  ED Management:  70-year-old female on aspirin who had a mechanical fall down several cm in steps onto knees, right hip right wrist elbow and shoulder causing pain with range of motion palpation and she has swelling of the left right distal radius.  She also has tenderness to the posterior head with not hematoma formation, tenderness to the midline cervical spine, and lumbar and sacral spine.  She was treated with Dilaudid 0.5 mg IV, and 4 Zofran to prevent nausea while awaiting evaluation.  X-rays were done CT scan of the cervical spine revealed degenerative changes no acute abnormalities CT scan the head was negative for any acute abnormalities x-ray of the right wrist elbow shoulder bilateral knees and right hip were all negative for fracture dislocation.  She was discharged home with Robaxin 500 mg to take orally family has been counseled any to stay with her she is going to be taking a muscle relaxer.  Dayan Abdul M.D.  10:52 AM 11/9/2020                               Clinical Impression:       ICD-10-CM ICD-9-CM   1. Lumbar strain, initial encounter  S39.012A 847.2   2. Right shoulder pain  M25.511 719.41   3. Right elbow pain  M25.521  719.42   4. Right wrist pain  M25.531 719.43   5. Coccyx pain  M53.3 724.79   6. Right hip pain  M25.551 719.45   7. Left knee pain  M25.562 719.46   8. Right knee pain  M25.561 719.46   9. Fall (on) (from) other stairs and steps, initial encounter  W10.8XXA E880.9   10. Closed head injury, initial encounter  S09.90XA 959.01                          ED Disposition Condition    Discharge Stable        ED Prescriptions     Medication Sig Dispense Start Date End Date Auth. Provider    methocarbamoL (ROBAXIN) 500 MG Tab Take 1 tablet (500 mg total) by mouth 3 (three) times daily. for 5 days 15 tablet 11/5/2020 11/10/2020 Dayan Abdul MD        Follow-up Information     Follow up With Specialties Details Why Contact Info Additional Information    Josh Mccord MD Family Medicine Schedule an appointment as soon as possible for a visit in 4 days If your symptoms do not improve [address]  [address]  [facility] [address]  [phone]       [facility] Emergency Medicine Go to  If symptoms worsen [address] [facility] [address]  [phone] 1st floor                                       Dayan Abdul MD  11/09/20 1056       Dayan Abdul MD  12/17/20 0671

## 2020-11-16 ENCOUNTER — HOSPITAL ENCOUNTER (OUTPATIENT)
Dept: PREADMISSION TESTING | Facility: HOSPITAL | Age: 70
Discharge: HOME | End: 2020-11-16

## 2020-11-16 DIAGNOSIS — Z01.818 PREOP TESTING: Primary | ICD-10-CM

## 2020-11-16 DIAGNOSIS — M17.12 PRIMARY OSTEOARTHRITIS OF LEFT KNEE: ICD-10-CM

## 2020-11-16 NOTE — TELEPHONE ENCOUNTER
----- Message from Klaudia Snyder sent at 11/16/2020  9:36 AM CST -----  Regarding: refills  Pt is needing a med refilled she got from the er   Methocarbamol 500 mg   Pharm [facility]   Pt [phone]

## 2020-11-17 RX ORDER — METHOCARBAMOL 500 MG/1
500 TABLET, FILM COATED ORAL 3 TIMES DAILY
Qty: 90 TABLET | Refills: 3 | Status: SHIPPED | OUTPATIENT
Start: 2020-11-17 | End: 2022-05-31

## 2020-11-18 ENCOUNTER — HOSPITAL ENCOUNTER (OUTPATIENT)
Dept: PREADMISSION TESTING | Facility: HOSPITAL | Age: 70
Discharge: HOME | End: 2020-11-18
Attending: ORTHOPAEDIC SURGERY
Payer: MEDICARE

## 2020-11-18 ENCOUNTER — HOSPITAL ENCOUNTER (OUTPATIENT)
Dept: RADIOLOGY | Facility: HOSPITAL | Age: 70
Discharge: HOME | End: 2020-11-18
Attending: ORTHOPAEDIC SURGERY
Payer: MEDICARE

## 2020-11-18 VITALS — WEIGHT: 174 LBS | HEIGHT: 62 IN | BODY MASS INDEX: 32.02 KG/M2

## 2020-11-18 DIAGNOSIS — Z01.818 PRE-OP TESTING: ICD-10-CM

## 2020-11-18 DIAGNOSIS — M17.12 PRIMARY OSTEOARTHRITIS OF LEFT KNEE: ICD-10-CM

## 2020-11-18 DIAGNOSIS — Z01.818 PREOP TESTING: Primary | ICD-10-CM

## 2020-11-18 LAB
ABO + RH BLD: NORMAL
ALBUMIN SERPL BCP-MCNC: 3.3 G/DL (ref 3.5–5.2)
ALP SERPL-CCNC: 118 U/L (ref 55–135)
ALT SERPL W/O P-5'-P-CCNC: 7 U/L (ref 10–44)
ANION GAP SERPL CALC-SCNC: 10 MMOL/L (ref 8–16)
AST SERPL-CCNC: 17 U/L (ref 10–40)
BASOPHILS # BLD AUTO: 0.11 K/UL (ref 0–0.2)
BASOPHILS NFR BLD: 1.3 % (ref 0–1.9)
BILIRUB SERPL-MCNC: 0.4 MG/DL (ref 0.1–1)
BLD GP AB SCN CELLS X3 SERPL QL: NORMAL
BLOOD GROUP ANTIBODIES SERPL: NORMAL
BUN SERPL-MCNC: 18 MG/DL (ref 8–23)
CALCIUM SERPL-MCNC: 9.4 MG/DL (ref 8.7–10.5)
CHLORIDE SERPL-SCNC: 107 MMOL/L (ref 95–110)
CO2 SERPL-SCNC: 21 MMOL/L (ref 23–29)
CREAT SERPL-MCNC: 0.9 MG/DL (ref 0.5–1.4)
DIFFERENTIAL METHOD: ABNORMAL
EOSINOPHIL # BLD AUTO: 0.3 K/UL (ref 0–0.5)
EOSINOPHIL NFR BLD: 3.7 % (ref 0–8)
ERYTHROCYTE [DISTWIDTH] IN BLOOD BY AUTOMATED COUNT: 12.8 % (ref 11.5–14.5)
EST. GFR  (AFRICAN AMERICAN): >60 ML/MIN/1.73 M^2
EST. GFR  (NON AFRICAN AMERICAN): >60 ML/MIN/1.73 M^2
GLUCOSE SERPL-MCNC: 101 MG/DL (ref 70–110)
HCT VFR BLD AUTO: 32 % (ref 37–48.5)
HGB BLD-MCNC: 10.3 G/DL (ref 12–16)
IMM GRANULOCYTES # BLD AUTO: 0.02 K/UL (ref 0–0.04)
IMM GRANULOCYTES NFR BLD AUTO: 0.2 % (ref 0–0.5)
LYMPHOCYTES # BLD AUTO: 1.4 K/UL (ref 1–4.8)
LYMPHOCYTES NFR BLD: 16.1 % (ref 18–48)
MCH RBC QN AUTO: 34.1 PG (ref 27–31)
MCHC RBC AUTO-ENTMCNC: 32.2 G/DL (ref 32–36)
MCV RBC AUTO: 106 FL (ref 82–98)
MONOCYTES # BLD AUTO: 0.4 K/UL (ref 0.3–1)
MONOCYTES NFR BLD: 4.9 % (ref 4–15)
NEUTROPHILS # BLD AUTO: 6.3 K/UL (ref 1.8–7.7)
NEUTROPHILS NFR BLD: 73.8 % (ref 38–73)
NRBC BLD-RTO: 0 /100 WBC
PLATELET # BLD AUTO: 497 K/UL (ref 150–350)
PMV BLD AUTO: 9 FL (ref 9.2–12.9)
POTASSIUM SERPL-SCNC: 4.3 MMOL/L (ref 3.5–5.1)
PROT SERPL-MCNC: 7.1 G/DL (ref 6–8.4)
RBC # BLD AUTO: 3.02 M/UL (ref 4–5.4)
SODIUM SERPL-SCNC: 138 MMOL/L (ref 136–145)
WBC # BLD AUTO: 8.55 K/UL (ref 3.9–12.7)

## 2020-11-18 PROCEDURE — 87081 CULTURE SCREEN ONLY: CPT

## 2020-11-18 PROCEDURE — 86870 RBC ANTIBODY IDENTIFICATION: CPT

## 2020-11-18 PROCEDURE — 80053 COMPREHEN METABOLIC PANEL: CPT

## 2020-11-18 PROCEDURE — 93005 ELECTROCARDIOGRAM TRACING: CPT

## 2020-11-18 PROCEDURE — 93010 EKG 12-LEAD: ICD-10-PCS | Mod: ,,, | Performed by: SPECIALIST

## 2020-11-18 PROCEDURE — 85025 COMPLETE CBC W/AUTO DIFF WBC: CPT

## 2020-11-18 PROCEDURE — 71046 X-RAY EXAM CHEST 2 VIEWS: CPT | Mod: TC,FY

## 2020-11-18 PROCEDURE — 99900103 DSU ONLY-NO CHARGE-INITIAL HR (STAT)

## 2020-11-18 PROCEDURE — 36415 COLL VENOUS BLD VENIPUNCTURE: CPT

## 2020-11-18 PROCEDURE — 83036 HEMOGLOBIN GLYCOSYLATED A1C: CPT | Mod: GA

## 2020-11-18 PROCEDURE — 86901 BLOOD TYPING SEROLOGIC RH(D): CPT

## 2020-11-18 PROCEDURE — 93010 ELECTROCARDIOGRAM REPORT: CPT | Mod: ,,, | Performed by: SPECIALIST

## 2020-11-18 PROCEDURE — 71046 X-RAY EXAM CHEST 2 VIEWS: CPT | Mod: 26,,, | Performed by: RADIOLOGY

## 2020-11-18 PROCEDURE — 71046 XR CHEST PA AND LATERAL: ICD-10-PCS | Mod: 26,,, | Performed by: RADIOLOGY

## 2020-11-18 PROCEDURE — 99900104 DSU ONLY-NO CHARGE-EA ADD'L HR (STAT)

## 2020-11-18 NOTE — DISCHARGE INSTRUCTIONS
To confirm, Your doctor has instructed you that surgery is scheduled for: 11/30/20  Britney [phone]    Please report to [facility] the morning of surgery. You must check-in and receive a wristband before going to your procedure.    Pre-Op will call the afternoon prior to surgery between 1:00 and 6:00 PM with the final arrival time.  Phone number: [phone]    PLEASE NOTE:  The surgery schedule has many variables which may affect the time of your surgery case.  Family members should be available if your surgery time changes.  Plan to be here the day of your procedure between 4-6 hours.    MEDICATIONS:  TAKE ONLY THESE MEDICATIONS WITH A SMALL SIP OF WATER THE MORNING OF YOUR PROCEDURE:    See List    DO NOT TAKE THESE MEDICATIONS 5-7 DAYS PRIOR to your procedure or per your surgeon's request: ASPIRIN, ALEVE, ADVIL, IBUPROFEN, FISH OIL VITAMIN E, HERBALS  LAST DOSE- 11/30/20    (May take Tylenol)    ONLY if you are prescribed any types of blood thinners such as:  Aspirin, Coumadin, Plavix, Pradaxa, Xarelto, Aggrenox, Effient, Eliquis, Savasya, Brilinta, or any other, ask your surgeon whether you should stop taking them and how long before surgery you should stop.  You may also need to verify with the prescribing physician if it is ok to stop your medication.      INSTRUCTIONS IMPORTANT!!  · Do not eat or drink anything between midnight and the time of your procedure- this includes gum, mints, and candy.  · Do not smoke or drink alcoholic beverages 24 hours prior to your procedure.  · Shower the night before AND the morning of your procedure with a Chlorhexidine wash such as Hibiclens or Dial antibacterial soap from the neck down.  Do not get it on your face or in your eyes.  You may use your own shampoo and face wash. This helps your skin to be as bacteria free as possible.    · If you wear contact lenses, dentures, hearing aids or glasses, bring a container to put them in  during surgery and give to a family member for safe keeping.  Please leave all jewelry, piercing's and valuables at home.   · DO NOT remove hair from the surgery site.  Do not shave the incision site unless you are given specific instructions to do so.    · ONLY if you have been diagnosed with sleep apnea please bring your C-PAP machine.  · ONLY if you wear home oxygen please bring your portable oxygen tank the day of your procedure.  · ONLY if you have a history of OPEN HEART SURGERY you will need a clearance from your Cardiologist per Anesthesia.      · ONLY for patients requiring bowel prep, written instructions will be given by your doctor's office.  · ONLY if you have a neuro stimulator, please bring the controller with you the morning of surgery  · ONLY if a type and screen test is needed before surgery, please return:  · If your doctor has scheduled you for an overnight stay, bring a small overnight bag with any personal items you need.  · Make arrangements in advance for transportation home by a responsible adult.  It is not safe to drive a vehicle during the 24 hours after anesthesia.     · ONLY ONE VISITOR PER PATIENT IS ALLOWED TO COME IN THE HOSPITAL THE DAY OF PROCEDURE.   · Visiting hours are 8:00AM-6:00PM. For the safety of all patients, visitors under the age of 12 are not allowed above the first floor of the hospital.    · All [facility] facilities and properties are tobacco free.  Smoking is NOT allowed.       If you have any questions about these instructions, call Pre-Op Admit  Nursing at [phone] or the Pre-Op Day Surgery Unit at [phone].

## 2020-11-18 NOTE — PRE ADMISSION SCREENING
JOINT CAMP ASSESSMENT    Name Lizette Palafox   MRN [id number]    Age/Sex 70 y.o. female    Surgeon Dr. Tito Love   Joint Camp Date 2020   Surgery Date 2020   Procedure Left Knee Arthroplasty   Insurance Payor: MEDICARE / Plan: MEDICARE PART A & B / Product Type: Government /    Care Team Patient Care Team:  Josh Mccord MD as PCP - General (Family Medicine)  Tito Love MD as Consulting Physician (Orthopedic Surgery)    Pharmacy   [id number] - [place] [id number]  AT [facility] [address] [facility]  [address] [facility] [address]  Phone: [phone] Fax: [phone]     AM-PAC Score   17   Risk Assessment Score 10     Past Medical History:   Diagnosis Date    Arthritis     Rheumatoid    Encounter for blood transfusion     GERD (gastroesophageal reflux disease)     Gout     Hypertension     Thyroid disease     Transfusion reaction        Past Surgical History:   Procedure Laterality Date    APPENDECTOMY       SECTION      x3    DILATION AND CURETTAGE OF UTERUS      GALLBLADDER SURGERY      HYSTERECTOMY      TOTAL HIP ARTHROPLASTY           Home Enviroment     Living Arrangement: Lives with family  Home Environment: 1-story house/ trailer, number of outside stairs: 3 without a railing, tub-shower  Home Safety Concerns: unremarkable    DISCHARGE CAREGIVER/SUPPORT SYSTEM     Identified post-op caregiver: Patient has children / family / friends to help, daughter.  Patient's caregiver(s) will be able to provide physical assistance. Patient will have someone to assist overnight.      Caregiver present at pre-op interview:  No      PRE-OPERATIVE FUNCTIONAL STATUS     Employment: Retired    Pre-op Functional Status: Patient is independent with mobility/ambulation, transfers, ADL's, IADL's.    Use of assistive device for ambulation: straight cane and rollator  ADL: self care  ADL Limitations: difficulty with walking  Medical Restrictions:  Frequent Falls and Decreased range of motions in extremities    POTENTIAL BARRIERS TO DISCHARGE/POTENTIAL POST-OP COMPLICATIONS     Patient with hx of previous blood transfusion and a transfusion reaction, HTN.     DISCHARGE PLAN     Expected LOS of 2 days or less for joint replacement discussed with patient. We also discussed a discharge path of HH for approximately two weeks with a transition to outpatient PT on the third week given no post-op complications.      Patient in agreement with discharge plan: Yes    Discharge to: Discharge home with home health (PT/OT) x2 weeks with transition to outpatient PT     [facility]     [facility]     Home DME: rollator, single point cane and tub transfer bench    Needed DME at D/C: rolling walker and bedside commode     Rx: Per Dr. Love at discharge     Meds to Beds: N/A  Patient expected to discharge on Aspirin 81mg by mouth twice daily for DVT prophylaxis.

## 2020-11-18 NOTE — PRE ADMISSION SCREENING
Patient Name: Lizette Palafox  YOB: 1950   MRN: [id number]     [facility]   Basic Mobility Inpatient Short Form 6 Clicks         How much difficulty does the patient currently have  Unable  A Lot  A Little  None      1. Turning over in bed (including adjusting bedclothes, sheets and blankets)?     1 []    2 [x]    3 []    4 []        2. Sitting down on and standing up from a chair with arms (e.g., wheelchair, bedside commode, etc.)     1 []  2 [x]  3 []     4 []      3. Moving from lying on back to sitting on the side of the bed?     1 []  2 []  3 [x]    4 []    How much help from another person does the patient currently need  Total  A Lot  A Little  None      4. Moving to and from a bed to a chair (including a wheelchair)?    1 []  2 []  3 [x]    4 []      5. Need to walk in hospital room?    1 []  2 []  3 []    4 [x]      6. Climbing 3-5 steps with a railing?    1 []  2 []  3 [x]    4 []       Raw Score:     17             CMS 0-100% Score:    50.57        %   Standardized Score:   42.13            CMS Modifier:       CK                                   [facility]   Basic [facility]ility [facility] Short Form 6 Clicks Score Conversion Table*         *Use this form to convert -PAC Basic Mobility Inpatient Raw Scores.   [facility] Inpatient Basic Mobility Short Form Scoring Example   1. Add the number values associated with the response to each item. For example, items totals yield a Raw Score of 21.   2. Match the raw score to the t-Scale scores (t-Scale score = 50.25, SE = 4.69).   3. Find the associated CMS % (CMS % = 28.97%).   4. Locate the correct CMS Functional Modifier Code, or G Code (G code = CJ)     NOTE: Each -PAC Short Form has a separate conversion table. Make sure that you use the correct conversion table.       Instruction Manual - page 45 contains conversion table

## 2020-11-18 NOTE — PRE ADMISSION SCREENING
"               [facility] Risk Assessment Scale    Patient Name: Lizette Palafox  YOB: 1950  MRN: [id number]            RIsk Factor Measure Recommendation Patient Data Scale/Score   BMI >40 Reconsider surgery, weight loss   Estimated body mass index is 31.83 kg/m² as calculated from the following:    Height as of this encounter: 5' 2" (1.575 m).    Weight as of this encounter: 78.9 kg (174 lb).   [] 0 = 1 - 24.9  [] 1 = 25-29.9  [x] 2 = 30-34.9  [] 3 = 35-39.9  [] 4 = 40-44.9  [] 5 = 45-99.9   Hemoglobin AIC (if applicable) >9 Delay surgery until DM under control  Refer for:  · Nutrition Therapy  · Exercise   · Medication    Lab Results   Component Value Date    HGBA1C 5.3 06/02/2020       Lab Results   Component Value Date    GLU 86 10/08/2020      [x] 0 = 4.0-5.6  [] 1 = 5.7-6.4  [] 2 = 6.5-6.9  [] 3 = 7.0-7.9  [] 4 = 8.0-8.9  [] 5 = 9.0-12   Hemoglobin (Anemia) <9 Delay surgery   Correct anemia Lab Results   Component Value Date    HGB 10.3 (L) 11/18/2020    [] 20 - <9.0                    Albumin <3 Delay surgery &Workup Lab Results   Component Value Date    ALBUMIN 3.5 10/23/2020    [] 20 - <3.0   Smoking Cessation >4 Weeks Delay Surgery  Refer to OP Cessation Class    Never Smoker [] 20 - current smoker                                _____ PPD                    Hx of MI, PE, Arrhythmia, CVA, DVT <30 Days Delay Surgery    N/A [] 20      Infection Variable Delay surgery and re-evaluate   N/A [] 20 - recent/current infection     Depression (PHQ) >10 out of 27 Delay Surgery and re-evaluate  Medication  Counseling              [x] 0     []1     []2     []3      []4      [] 5                    (1-4)      (5-9)  (10-14)  (15-19)   (20-27)     Memory Impairment & Memory loss (Mini-Cog Screening Tool) Advanced dementia and/or Parkinson's Reconsider surgery     [x] 0     []1     []2     []3     []4     [] 5     Physical Conditioning (Modified AM-PAC Per Physical Therapy at [facility]) Unable to ambulate on day of " surgery Delay surgery and re-evaluate  Pre-Rehabilitation   (PT evaluation)       []  0   []4       [x]8     []12        []16     []20       (<20%)   (<40%)   (<60%)   (<80% )    (>80%)     Home Environment/Caregiver support  (Per /Navigator Interview)    Availability of basic services and/or approprate assistance during post-operative period Delay surgery and re-evaluate  Safe home environment  Health   1 week post-surgery  Transportation  availability  Ability to obtain DME/Medications post-op    [x] 0     []1     []2     []3     []4     [] 5  [x] 0     []1     []2     []3     []4     [] 5  [x] 0     []1     []2     []3     []4     [] 5  [x] 0     []1     []2     []3     []4     [] 5         MD Contact: Dr. Love Comments:  Total Score:  10

## 2020-11-19 LAB
ESTIMATED AVG GLUCOSE: 100 MG/DL (ref 68–131)
HBA1C MFR BLD HPLC: 5.1 % (ref 4–5.6)

## 2020-11-20 LAB — MRSA SPEC QL CULT: NORMAL

## 2020-11-24 ENCOUNTER — TELEPHONE (OUTPATIENT)
Dept: ORTHOPEDICS | Facility: CLINIC | Age: 70
End: 2020-11-24

## 2020-11-24 NOTE — TELEPHONE ENCOUNTER
----- Message from Berhane Dave sent at 11/24/2020 12:07 PM CST -----  Regarding: Re Barbara Sandoval  Contact: pt   call

## 2020-11-24 NOTE — TELEPHONE ENCOUNTER
Patient states she is not ready for this surgery. She states she is not having any pain at this time and dong well. She would like to call back after the new year and schedule for sometimes in January 2021.

## 2020-11-25 ENCOUNTER — TELEPHONE (OUTPATIENT)
Dept: FAMILY MEDICINE | Facility: CLINIC | Age: 70
End: 2020-11-25

## 2020-11-25 NOTE — TELEPHONE ENCOUNTER
Telephone    11/24/2020  [facility]     Tito Love MD  Orthopedic Surgery   Conversation  (Newest Message First)  Ijeomaabebe Levy MA         11/24/20 4:05 PM  Note     Patient states she is not ready for this surgery. She states she is not having any pain at this time and dong well. She would like to call back after the new year and schedule for sometimes in January 2021.                   ----- Message from Kristen Hogan LPN sent at 10/23/2020  1:41 PM CDT -----  Regarding: Surgical Clearance Request  Good afternoon,     The attached patient, Ms. Lizette Palafox is scheduled for a Left Total Knee Arthroplasty on November 30, 2020 at [facility]. Patient states she is currently taking the following medications: Valsartan 160 mg, Carvedilol 3.125 mg, and Metformin 500 mg. Please send surgical clearance and instructions on the above medications to [phone].     Kind Regards,     Physician: Tito Love MD  Contact: Kristen Hogan LPN  Phone: [phone]   Fax: [phone]

## 2020-12-10 ENCOUNTER — TELEPHONE (OUTPATIENT)
Dept: FAMILY MEDICINE | Facility: CLINIC | Age: 70
End: 2020-12-10

## 2020-12-10 NOTE — TELEPHONE ENCOUNTER
Spoke with pts daughter, she is still in pain even with the tramadol but also the it makes her sick to her stomach. She vomits while on it sometimes. Pt would like to know is there anything else she can take.

## 2020-12-10 NOTE — TELEPHONE ENCOUNTER
Is patient requesting medication for rheumatoid pain or for knee painJIf RA pain she needs to talk with Dr. Corona. If knee pain , I will call in something else until she sees Dr Love again about knee replacment

## 2020-12-10 NOTE — TELEPHONE ENCOUNTER
----- Message from Klaudia Snyder sent at 12/9/2020  3:15 PM CST -----  Regarding: needing stronger med  Pt is in pain in the legs and arm   Needing something stronger than tramadol   Pharm [facility]   Pt [phone]

## 2020-12-10 NOTE — TELEPHONE ENCOUNTER
Pt daughter states the pt says the pain is in her arms and in her legs like a burning/ pt not c/o knee pain I advised the daughter to please consult Dr Corona

## 2021-01-11 DIAGNOSIS — M06.9 RHEUMATOID ARTHRITIS: ICD-10-CM

## 2021-01-12 RX ORDER — METHOTREXATE 2.5 MG/1
20 TABLET ORAL
Qty: 32 TABLET | Refills: 5 | Status: SHIPPED | OUTPATIENT
Start: 2021-01-15 | End: 2021-11-18 | Stop reason: SDUPTHER

## 2021-01-13 ENCOUNTER — OFFICE VISIT (OUTPATIENT)
Dept: RHEUMATOLOGY | Facility: CLINIC | Age: 71
End: 2021-01-13
Payer: MEDICARE

## 2021-01-13 VITALS
TEMPERATURE: 98 F | BODY MASS INDEX: 31.83 KG/M2 | SYSTOLIC BLOOD PRESSURE: 101 MMHG | WEIGHT: 174 LBS | DIASTOLIC BLOOD PRESSURE: 65 MMHG

## 2021-01-13 DIAGNOSIS — M17.10 OSTEOARTHRITIS OF KNEE, UNILATERAL: ICD-10-CM

## 2021-01-13 DIAGNOSIS — M06.9 RHEUMATOID ARTHRITIS, INVOLVING UNSPECIFIED SITE, UNSPECIFIED WHETHER RHEUMATOID FACTOR PRESENT: Primary | ICD-10-CM

## 2021-01-13 PROCEDURE — 99213 OFFICE O/P EST LOW 20 MIN: CPT | Mod: S$GLB,,, | Performed by: INTERNAL MEDICINE

## 2021-01-13 PROCEDURE — 99213 PR OFFICE/OUTPT VISIT, EST, LEVL III, 20-29 MIN: ICD-10-PCS | Mod: S$GLB,,, | Performed by: INTERNAL MEDICINE

## 2021-01-13 RX ORDER — TRAMADOL HYDROCHLORIDE 50 MG/1
50 TABLET ORAL EVERY 6 HOURS PRN
Status: ON HOLD | COMMUNITY
End: 2021-03-24 | Stop reason: SDUPTHER

## 2021-01-13 RX ORDER — METHYLPREDNISOLONE 4 MG/1
4 TABLET ORAL
Status: ON HOLD | COMMUNITY
End: 2021-03-23 | Stop reason: ALTCHOICE

## 2021-03-03 DIAGNOSIS — I10 ESSENTIAL HYPERTENSION: ICD-10-CM

## 2021-03-03 DIAGNOSIS — K21.9 GASTROESOPHAGEAL REFLUX DISEASE WITHOUT ESOPHAGITIS: ICD-10-CM

## 2021-03-03 DIAGNOSIS — M10.9 GOUT, UNSPECIFIED CAUSE, UNSPECIFIED CHRONICITY, UNSPECIFIED SITE: ICD-10-CM

## 2021-03-03 DIAGNOSIS — M54.16 LUMBAR RADICULOPATHY: ICD-10-CM

## 2021-03-03 DIAGNOSIS — E03.9 HYPOTHYROIDISM (ACQUIRED): ICD-10-CM

## 2021-03-03 DIAGNOSIS — E11.9 TYPE 2 DIABETES MELLITUS WITHOUT COMPLICATION, WITHOUT LONG-TERM CURRENT USE OF INSULIN: ICD-10-CM

## 2021-03-03 RX ORDER — ALLOPURINOL 300 MG/1
300 TABLET ORAL 2 TIMES DAILY
Qty: 60 TABLET | Refills: 0 | Status: SHIPPED | OUTPATIENT
Start: 2021-03-03 | End: 2021-03-25 | Stop reason: SDUPTHER

## 2021-03-03 RX ORDER — METFORMIN HYDROCHLORIDE 500 MG/1
500 TABLET, EXTENDED RELEASE ORAL
Qty: 30 TABLET | Refills: 0 | Status: SHIPPED | OUTPATIENT
Start: 2021-03-03 | End: 2021-03-25 | Stop reason: ALTCHOICE

## 2021-03-03 RX ORDER — LEVOTHYROXINE SODIUM 100 UG/1
100 TABLET ORAL
Qty: 30 TABLET | Refills: 0 | Status: SHIPPED | OUTPATIENT
Start: 2021-03-03 | End: 2021-03-25

## 2021-03-03 RX ORDER — OMEPRAZOLE 40 MG/1
40 CAPSULE, DELAYED RELEASE ORAL DAILY
Qty: 30 CAPSULE | Refills: 0 | Status: SHIPPED | OUTPATIENT
Start: 2021-03-03 | End: 2021-03-05

## 2021-03-03 RX ORDER — CARVEDILOL 3.12 MG/1
3.12 TABLET ORAL 2 TIMES DAILY WITH MEALS
Qty: 60 TABLET | Refills: 0 | Status: SHIPPED | OUTPATIENT
Start: 2021-03-03 | End: 2021-03-07

## 2021-03-03 RX ORDER — GABAPENTIN 300 MG/1
300 CAPSULE ORAL 2 TIMES DAILY
Qty: 60 CAPSULE | Refills: 0 | Status: SHIPPED | OUTPATIENT
Start: 2021-03-03 | End: 2021-03-25 | Stop reason: SDUPTHER

## 2021-03-05 DIAGNOSIS — K21.9 GASTROESOPHAGEAL REFLUX DISEASE WITHOUT ESOPHAGITIS: ICD-10-CM

## 2021-03-05 RX ORDER — OMEPRAZOLE 40 MG/1
CAPSULE, DELAYED RELEASE ORAL
Qty: 90 CAPSULE | Refills: 1 | Status: SHIPPED | OUTPATIENT
Start: 2021-03-05 | End: 2021-07-26 | Stop reason: SDUPTHER

## 2021-03-08 ENCOUNTER — TELEPHONE (OUTPATIENT)
Dept: FAMILY MEDICINE | Facility: CLINIC | Age: 71
End: 2021-03-08

## 2021-03-09 DIAGNOSIS — I10 ESSENTIAL HYPERTENSION: ICD-10-CM

## 2021-03-09 RX ORDER — VALSARTAN 160 MG/1
160 TABLET ORAL DAILY
Qty: 90 TABLET | Refills: 3 | Status: SHIPPED | OUTPATIENT
Start: 2021-03-09 | End: 2022-11-30

## 2021-03-11 ENCOUNTER — TELEPHONE (OUTPATIENT)
Dept: FAMILY MEDICINE | Facility: CLINIC | Age: 71
End: 2021-03-11

## 2021-03-11 DIAGNOSIS — E78.2 MIXED HYPERLIPIDEMIA: ICD-10-CM

## 2021-03-11 RX ORDER — PRAVASTATIN SODIUM 40 MG/1
40 TABLET ORAL DAILY
Qty: 90 TABLET | Refills: 3 | Status: SHIPPED | OUTPATIENT
Start: 2021-03-11 | End: 2022-05-31 | Stop reason: SDUPTHER

## 2021-03-18 ENCOUNTER — TELEPHONE (OUTPATIENT)
Dept: FAMILY MEDICINE | Facility: CLINIC | Age: 71
End: 2021-03-18

## 2021-03-22 ENCOUNTER — TELEPHONE (OUTPATIENT)
Dept: FAMILY MEDICINE | Facility: CLINIC | Age: 71
End: 2021-03-22

## 2021-03-23 ENCOUNTER — HOSPITAL ENCOUNTER (OUTPATIENT)
Facility: HOSPITAL | Age: 71
Discharge: STILL ADMITTED OR ADMINISTRATIVE | End: 2021-03-24
Attending: EMERGENCY MEDICINE | Admitting: INTERNAL MEDICINE
Payer: MEDICARE

## 2021-03-23 DIAGNOSIS — R13.0 INABILITY TO SWALLOW: ICD-10-CM

## 2021-03-23 DIAGNOSIS — J02.9 PHARYNGITIS: ICD-10-CM

## 2021-03-23 DIAGNOSIS — R07.9 CHEST PAIN: ICD-10-CM

## 2021-03-23 DIAGNOSIS — J02.9 PHARYNGITIS, UNSPECIFIED ETIOLOGY: Primary | ICD-10-CM

## 2021-03-23 PROBLEM — J01.90 ACUTE SINUSITIS: Status: ACTIVE | Noted: 2021-03-23

## 2021-03-23 LAB
ALBUMIN SERPL BCP-MCNC: 3.6 G/DL (ref 3.5–5.2)
ALP SERPL-CCNC: 69 U/L (ref 55–135)
ALT SERPL W/O P-5'-P-CCNC: 9 U/L (ref 10–44)
ANION GAP SERPL CALC-SCNC: 11 MMOL/L (ref 8–16)
AST SERPL-CCNC: 17 U/L (ref 10–40)
BASOPHILS # BLD AUTO: 0.08 K/UL (ref 0–0.2)
BASOPHILS NFR BLD: 0.6 % (ref 0–1.9)
BILIRUB SERPL-MCNC: 0.8 MG/DL (ref 0.1–1)
BUN SERPL-MCNC: 14 MG/DL (ref 8–23)
CALCIUM SERPL-MCNC: 9.6 MG/DL (ref 8.7–10.5)
CHLORIDE SERPL-SCNC: 106 MMOL/L (ref 95–110)
CK SERPL-CCNC: 49 U/L (ref 20–180)
CO2 SERPL-SCNC: 21 MMOL/L (ref 23–29)
CREAT SERPL-MCNC: 0.8 MG/DL (ref 0.5–1.4)
DEPRECATED S PYO AG THROAT QL EIA: NEGATIVE
DIFFERENTIAL METHOD: ABNORMAL
EOSINOPHIL # BLD AUTO: 0.8 K/UL (ref 0–0.5)
EOSINOPHIL NFR BLD: 6.6 % (ref 0–8)
ERYTHROCYTE [DISTWIDTH] IN BLOOD BY AUTOMATED COUNT: 14.6 % (ref 11.5–14.5)
EST. GFR  (AFRICAN AMERICAN): >60 ML/MIN/1.73 M^2
EST. GFR  (NON AFRICAN AMERICAN): >60 ML/MIN/1.73 M^2
GLUCOSE SERPL-MCNC: 116 MG/DL (ref 70–110)
GLUCOSE SERPL-MCNC: 87 MG/DL (ref 70–110)
HCT VFR BLD AUTO: 34.5 % (ref 37–48.5)
HGB BLD-MCNC: 11.4 G/DL (ref 12–16)
IMM GRANULOCYTES # BLD AUTO: 0.1 K/UL (ref 0–0.04)
IMM GRANULOCYTES NFR BLD AUTO: 0.8 % (ref 0–0.5)
INFLUENZA A, MOLECULAR: NEGATIVE
INFLUENZA B, MOLECULAR: NEGATIVE
LACTATE SERPL-SCNC: 1.1 MMOL/L (ref 0.5–1.9)
LYMPHOCYTES # BLD AUTO: 2.1 K/UL (ref 1–4.8)
LYMPHOCYTES NFR BLD: 16.8 % (ref 18–48)
MAGNESIUM SERPL-MCNC: 1.5 MG/DL (ref 1.6–2.6)
MCH RBC QN AUTO: 32.9 PG (ref 27–31)
MCHC RBC AUTO-ENTMCNC: 33 G/DL (ref 32–36)
MCV RBC AUTO: 100 FL (ref 82–98)
MONOCYTES # BLD AUTO: 1 K/UL (ref 0.3–1)
MONOCYTES NFR BLD: 7.9 % (ref 4–15)
NEUTROPHILS # BLD AUTO: 8.4 K/UL (ref 1.8–7.7)
NEUTROPHILS NFR BLD: 67.3 % (ref 38–73)
NRBC BLD-RTO: 0 /100 WBC
PLATELET # BLD AUTO: 314 K/UL (ref 150–350)
PMV BLD AUTO: 9.7 FL (ref 9.2–12.9)
POTASSIUM SERPL-SCNC: 3.8 MMOL/L (ref 3.5–5.1)
PROT SERPL-MCNC: 7.3 G/DL (ref 6–8.4)
RBC # BLD AUTO: 3.46 M/UL (ref 4–5.4)
SARS-COV-2 RDRP RESP QL NAA+PROBE: NEGATIVE
SODIUM SERPL-SCNC: 138 MMOL/L (ref 136–145)
SPECIMEN SOURCE: NORMAL
T4 FREE SERPL-MCNC: 0.84 NG/DL (ref 0.71–1.51)
TSH SERPL DL<=0.005 MIU/L-ACNC: 45.06 UIU/ML (ref 0.34–5.6)
WBC # BLD AUTO: 12.44 K/UL (ref 3.9–12.7)

## 2021-03-23 PROCEDURE — 82962 GLUCOSE BLOOD TEST: CPT

## 2021-03-23 PROCEDURE — 83605 ASSAY OF LACTIC ACID: CPT | Performed by: EMERGENCY MEDICINE

## 2021-03-23 PROCEDURE — 80053 COMPREHEN METABOLIC PANEL: CPT | Performed by: EMERGENCY MEDICINE

## 2021-03-23 PROCEDURE — 87880 STREP A ASSAY W/OPTIC: CPT | Performed by: EMERGENCY MEDICINE

## 2021-03-23 PROCEDURE — 96365 THER/PROPH/DIAG IV INF INIT: CPT | Mod: 59

## 2021-03-23 PROCEDURE — 99285 EMERGENCY DEPT VISIT HI MDM: CPT | Mod: 25

## 2021-03-23 PROCEDURE — 25500020 PHARM REV CODE 255: Performed by: EMERGENCY MEDICINE

## 2021-03-23 PROCEDURE — 84443 ASSAY THYROID STIM HORMONE: CPT | Performed by: EMERGENCY MEDICINE

## 2021-03-23 PROCEDURE — 96366 THER/PROPH/DIAG IV INF ADDON: CPT

## 2021-03-23 PROCEDURE — G0378 HOSPITAL OBSERVATION PER HR: HCPCS | Mod: CS

## 2021-03-23 PROCEDURE — 85025 COMPLETE CBC W/AUTO DIFF WBC: CPT | Performed by: EMERGENCY MEDICINE

## 2021-03-23 PROCEDURE — 87040 BLOOD CULTURE FOR BACTERIA: CPT | Mod: 59 | Performed by: EMERGENCY MEDICINE

## 2021-03-23 PROCEDURE — 96376 TX/PRO/DX INJ SAME DRUG ADON: CPT | Mod: 59

## 2021-03-23 PROCEDURE — 25000003 PHARM REV CODE 250: Performed by: EMERGENCY MEDICINE

## 2021-03-23 PROCEDURE — 87502 INFLUENZA DNA AMP PROBE: CPT | Performed by: EMERGENCY MEDICINE

## 2021-03-23 PROCEDURE — G0378 HOSPITAL OBSERVATION PER HR: HCPCS

## 2021-03-23 PROCEDURE — U0002 COVID-19 LAB TEST NON-CDC: HCPCS | Performed by: EMERGENCY MEDICINE

## 2021-03-23 PROCEDURE — 83735 ASSAY OF MAGNESIUM: CPT | Performed by: EMERGENCY MEDICINE

## 2021-03-23 PROCEDURE — 82550 ASSAY OF CK (CPK): CPT | Performed by: EMERGENCY MEDICINE

## 2021-03-23 PROCEDURE — 96367 TX/PROPH/DG ADDL SEQ IV INF: CPT

## 2021-03-23 PROCEDURE — 96375 TX/PRO/DX INJ NEW DRUG ADDON: CPT

## 2021-03-23 PROCEDURE — 87081 CULTURE SCREEN ONLY: CPT | Performed by: EMERGENCY MEDICINE

## 2021-03-23 PROCEDURE — 63600175 PHARM REV CODE 636 W HCPCS: Performed by: INTERNAL MEDICINE

## 2021-03-23 PROCEDURE — 25000003 PHARM REV CODE 250: Performed by: INTERNAL MEDICINE

## 2021-03-23 PROCEDURE — 84439 ASSAY OF FREE THYROXINE: CPT | Performed by: EMERGENCY MEDICINE

## 2021-03-23 PROCEDURE — 63600175 PHARM REV CODE 636 W HCPCS: Performed by: EMERGENCY MEDICINE

## 2021-03-23 RX ORDER — MAGNESIUM SULFATE 1 G/100ML
1 INJECTION INTRAVENOUS ONCE
Status: COMPLETED | OUTPATIENT
Start: 2021-03-23 | End: 2021-03-23

## 2021-03-23 RX ORDER — DEXAMETHASONE SODIUM PHOSPHATE 4 MG/ML
4 INJECTION, SOLUTION INTRA-ARTICULAR; INTRALESIONAL; INTRAMUSCULAR; INTRAVENOUS; SOFT TISSUE
Status: COMPLETED | OUTPATIENT
Start: 2021-03-23 | End: 2021-03-23

## 2021-03-23 RX ORDER — FERROUS SULFATE 325(65) MG
325 TABLET ORAL NIGHTLY
Status: DISCONTINUED | OUTPATIENT
Start: 2021-03-23 | End: 2021-03-24 | Stop reason: HOSPADM

## 2021-03-23 RX ORDER — NAPROXEN SODIUM 220 MG/1
81 TABLET, FILM COATED ORAL DAILY
Status: DISCONTINUED | OUTPATIENT
Start: 2021-03-24 | End: 2021-03-24 | Stop reason: HOSPADM

## 2021-03-23 RX ORDER — ACETAMINOPHEN 10 MG/ML
1000 INJECTION, SOLUTION INTRAVENOUS EVERY 8 HOURS
Status: COMPLETED | OUTPATIENT
Start: 2021-03-23 | End: 2021-03-24

## 2021-03-23 RX ORDER — TALC
6 POWDER (GRAM) TOPICAL NIGHTLY PRN
Status: DISCONTINUED | OUTPATIENT
Start: 2021-03-23 | End: 2021-03-24 | Stop reason: HOSPADM

## 2021-03-23 RX ORDER — PRAVASTATIN SODIUM 40 MG/1
40 TABLET ORAL NIGHTLY
Status: DISCONTINUED | OUTPATIENT
Start: 2021-03-23 | End: 2021-03-24 | Stop reason: HOSPADM

## 2021-03-23 RX ORDER — IBUPROFEN 200 MG
24 TABLET ORAL
Status: DISCONTINUED | OUTPATIENT
Start: 2021-03-23 | End: 2021-03-24 | Stop reason: HOSPADM

## 2021-03-23 RX ORDER — PANTOPRAZOLE SODIUM 40 MG/1
40 TABLET, DELAYED RELEASE ORAL DAILY
Refills: 1 | Status: DISCONTINUED | OUTPATIENT
Start: 2021-03-24 | End: 2021-03-24 | Stop reason: HOSPADM

## 2021-03-23 RX ORDER — METHOCARBAMOL 500 MG/1
500 TABLET, FILM COATED ORAL 3 TIMES DAILY
Status: DISCONTINUED | OUTPATIENT
Start: 2021-03-23 | End: 2021-03-24 | Stop reason: HOSPADM

## 2021-03-23 RX ORDER — SODIUM CHLORIDE 0.9 % (FLUSH) 0.9 %
10 SYRINGE (ML) INJECTION
Status: DISCONTINUED | OUTPATIENT
Start: 2021-03-23 | End: 2021-03-24 | Stop reason: HOSPADM

## 2021-03-23 RX ORDER — SODIUM CHLORIDE 9 MG/ML
INJECTION, SOLUTION INTRAVENOUS
Status: COMPLETED | OUTPATIENT
Start: 2021-03-23 | End: 2021-03-23

## 2021-03-23 RX ORDER — IBUPROFEN 400 MG/1
400 TABLET ORAL 3 TIMES DAILY
Status: DISCONTINUED | OUTPATIENT
Start: 2021-03-23 | End: 2021-03-24 | Stop reason: HOSPADM

## 2021-03-23 RX ORDER — GLUCAGON 1 MG
1 KIT INJECTION
Status: DISCONTINUED | OUTPATIENT
Start: 2021-03-23 | End: 2021-03-24 | Stop reason: HOSPADM

## 2021-03-23 RX ORDER — GABAPENTIN 300 MG/1
300 CAPSULE ORAL 2 TIMES DAILY
Status: DISCONTINUED | OUTPATIENT
Start: 2021-03-23 | End: 2021-03-24 | Stop reason: HOSPADM

## 2021-03-23 RX ORDER — VALSARTAN 80 MG/1
160 TABLET ORAL NIGHTLY
Status: DISCONTINUED | OUTPATIENT
Start: 2021-03-23 | End: 2021-03-24 | Stop reason: HOSPADM

## 2021-03-23 RX ORDER — ONDANSETRON 2 MG/ML
4 INJECTION INTRAMUSCULAR; INTRAVENOUS EVERY 6 HOURS PRN
Status: DISCONTINUED | OUTPATIENT
Start: 2021-03-23 | End: 2021-03-24 | Stop reason: HOSPADM

## 2021-03-23 RX ORDER — ACETAMINOPHEN 325 MG/1
650 TABLET ORAL
Status: DISCONTINUED | OUTPATIENT
Start: 2021-03-23 | End: 2021-03-23

## 2021-03-23 RX ORDER — LEVOTHYROXINE SODIUM 100 UG/1
100 TABLET ORAL
Status: DISCONTINUED | OUTPATIENT
Start: 2021-03-24 | End: 2021-03-24 | Stop reason: HOSPADM

## 2021-03-23 RX ORDER — POLYETHYLENE GLYCOL 3350 17 G/17G
17 POWDER, FOR SOLUTION ORAL 2 TIMES DAILY PRN
Status: DISCONTINUED | OUTPATIENT
Start: 2021-03-23 | End: 2021-03-24

## 2021-03-23 RX ORDER — IBUPROFEN 200 MG
16 TABLET ORAL
Status: DISCONTINUED | OUTPATIENT
Start: 2021-03-23 | End: 2021-03-24 | Stop reason: HOSPADM

## 2021-03-23 RX ORDER — ALLOPURINOL 300 MG/1
300 TABLET ORAL 2 TIMES DAILY
Status: DISCONTINUED | OUTPATIENT
Start: 2021-03-23 | End: 2021-03-24 | Stop reason: HOSPADM

## 2021-03-23 RX ORDER — CARVEDILOL 3.12 MG/1
3.12 TABLET ORAL 2 TIMES DAILY WITH MEALS
Status: DISCONTINUED | OUTPATIENT
Start: 2021-03-23 | End: 2021-03-24 | Stop reason: HOSPADM

## 2021-03-23 RX ADMIN — CEFTRIAXONE 2 G: 2 INJECTION, SOLUTION INTRAVENOUS at 01:03

## 2021-03-23 RX ADMIN — IBUPROFEN 400 MG: 400 TABLET, FILM COATED ORAL at 10:03

## 2021-03-23 RX ADMIN — METHOCARBAMOL 500 MG: 500 TABLET ORAL at 10:03

## 2021-03-23 RX ADMIN — GABAPENTIN 300 MG: 300 CAPSULE ORAL at 10:03

## 2021-03-23 RX ADMIN — FERROUS SULFATE TAB 325 MG (65 MG ELEMENTAL FE) 325 MG: 325 (65 FE) TAB at 10:03

## 2021-03-23 RX ADMIN — ACETAMINOPHEN 1000 MG: 10 INJECTION INTRAVENOUS at 02:03

## 2021-03-23 RX ADMIN — VALSARTAN 160 MG: 80 TABLET ORAL at 10:03

## 2021-03-23 RX ADMIN — CARVEDILOL 3.12 MG: 3.12 TABLET, FILM COATED ORAL at 10:03

## 2021-03-23 RX ADMIN — DEXAMETHASONE SODIUM PHOSPHATE 4 MG: 4 INJECTION, SOLUTION INTRA-ARTICULAR; INTRALESIONAL; INTRAMUSCULAR; INTRAVENOUS; SOFT TISSUE at 01:03

## 2021-03-23 RX ADMIN — PRAVASTATIN SODIUM 40 MG: 40 TABLET ORAL at 10:03

## 2021-03-23 RX ADMIN — SODIUM CHLORIDE: 0.9 INJECTION, SOLUTION INTRAVENOUS at 01:03

## 2021-03-23 RX ADMIN — MAGNESIUM SULFATE 1 G: 1 INJECTION INTRAVENOUS at 03:03

## 2021-03-23 RX ADMIN — ACETAMINOPHEN 1000 MG: 10 INJECTION INTRAVENOUS at 10:03

## 2021-03-23 RX ADMIN — IOHEXOL 100 ML: 350 INJECTION, SOLUTION INTRAVENOUS at 01:03

## 2021-03-23 RX ADMIN — ALLOPURINOL 300 MG: 300 TABLET ORAL at 10:03

## 2021-03-24 VITALS
HEIGHT: 62 IN | HEART RATE: 61 BPM | TEMPERATURE: 98 F | BODY MASS INDEX: 28.07 KG/M2 | OXYGEN SATURATION: 95 % | WEIGHT: 152.56 LBS | SYSTOLIC BLOOD PRESSURE: 115 MMHG | RESPIRATION RATE: 18 BRPM | DIASTOLIC BLOOD PRESSURE: 63 MMHG

## 2021-03-24 LAB
ANION GAP SERPL CALC-SCNC: 13 MMOL/L (ref 8–16)
BASOPHILS # BLD AUTO: 0.04 K/UL (ref 0–0.2)
BASOPHILS NFR BLD: 0.4 % (ref 0–1.9)
BUN SERPL-MCNC: 14 MG/DL (ref 8–23)
CALCIUM SERPL-MCNC: 9.3 MG/DL (ref 8.7–10.5)
CHLORIDE SERPL-SCNC: 107 MMOL/L (ref 95–110)
CO2 SERPL-SCNC: 20 MMOL/L (ref 23–29)
CREAT SERPL-MCNC: 0.8 MG/DL (ref 0.5–1.4)
DIFFERENTIAL METHOD: ABNORMAL
EOSINOPHIL # BLD AUTO: 0 K/UL (ref 0–0.5)
EOSINOPHIL NFR BLD: 0 % (ref 0–8)
ERYTHROCYTE [DISTWIDTH] IN BLOOD BY AUTOMATED COUNT: 14.6 % (ref 11.5–14.5)
EST. GFR  (AFRICAN AMERICAN): >60 ML/MIN/1.73 M^2
EST. GFR  (NON AFRICAN AMERICAN): >60 ML/MIN/1.73 M^2
GLUCOSE SERPL-MCNC: 88 MG/DL (ref 70–110)
GLUCOSE SERPL-MCNC: 96 MG/DL (ref 70–110)
GLUCOSE SERPL-MCNC: 97 MG/DL (ref 70–110)
HCT VFR BLD AUTO: 31.4 % (ref 37–48.5)
HGB BLD-MCNC: 10.1 G/DL (ref 12–16)
IMM GRANULOCYTES # BLD AUTO: 0.12 K/UL (ref 0–0.04)
IMM GRANULOCYTES NFR BLD AUTO: 1.1 % (ref 0–0.5)
LYMPHOCYTES # BLD AUTO: 1.8 K/UL (ref 1–4.8)
LYMPHOCYTES NFR BLD: 15.5 % (ref 18–48)
MAGNESIUM SERPL-MCNC: 1.8 MG/DL (ref 1.6–2.6)
MCH RBC QN AUTO: 32.4 PG (ref 27–31)
MCHC RBC AUTO-ENTMCNC: 32.2 G/DL (ref 32–36)
MCV RBC AUTO: 101 FL (ref 82–98)
MONOCYTES # BLD AUTO: 0.4 K/UL (ref 0.3–1)
MONOCYTES NFR BLD: 3.5 % (ref 4–15)
NEUTROPHILS # BLD AUTO: 9.1 K/UL (ref 1.8–7.7)
NEUTROPHILS NFR BLD: 79.5 % (ref 38–73)
NRBC BLD-RTO: 0 /100 WBC
PLATELET # BLD AUTO: 314 K/UL (ref 150–350)
PMV BLD AUTO: 10 FL (ref 9.2–12.9)
POTASSIUM SERPL-SCNC: 4.3 MMOL/L (ref 3.5–5.1)
RBC # BLD AUTO: 3.12 M/UL (ref 4–5.4)
SODIUM SERPL-SCNC: 140 MMOL/L (ref 136–145)
WBC # BLD AUTO: 11.38 K/UL (ref 3.9–12.7)

## 2021-03-24 PROCEDURE — 96375 TX/PRO/DX INJ NEW DRUG ADDON: CPT | Mod: 59

## 2021-03-24 PROCEDURE — 85025 COMPLETE CBC W/AUTO DIFF WBC: CPT | Performed by: INTERNAL MEDICINE

## 2021-03-24 PROCEDURE — 80048 BASIC METABOLIC PNL TOTAL CA: CPT | Performed by: INTERNAL MEDICINE

## 2021-03-24 PROCEDURE — 83735 ASSAY OF MAGNESIUM: CPT | Performed by: INTERNAL MEDICINE

## 2021-03-24 PROCEDURE — G0378 HOSPITAL OBSERVATION PER HR: HCPCS

## 2021-03-24 PROCEDURE — 36415 COLL VENOUS BLD VENIPUNCTURE: CPT | Performed by: INTERNAL MEDICINE

## 2021-03-24 PROCEDURE — 63600175 PHARM REV CODE 636 W HCPCS: Performed by: INTERNAL MEDICINE

## 2021-03-24 PROCEDURE — 25000003 PHARM REV CODE 250: Performed by: INTERNAL MEDICINE

## 2021-03-24 PROCEDURE — 96376 TX/PRO/DX INJ SAME DRUG ADON: CPT | Mod: 59

## 2021-03-24 PROCEDURE — 25000003 PHARM REV CODE 250: Performed by: FAMILY MEDICINE

## 2021-03-24 RX ORDER — IBUPROFEN 400 MG/1
400 TABLET ORAL 3 TIMES DAILY
Qty: 15 TABLET | Refills: 0 | Status: SHIPPED | OUTPATIENT
Start: 2021-03-24 | End: 2021-03-29

## 2021-03-24 RX ORDER — AMOXICILLIN AND CLAVULANATE POTASSIUM 400; 57 MG/5ML; MG/5ML
825 POWDER, FOR SUSPENSION ORAL EVERY 12 HOURS
Qty: 103 ML | Refills: 0 | Status: SHIPPED | OUTPATIENT
Start: 2021-03-24 | End: 2021-03-29

## 2021-03-24 RX ORDER — DOCUSATE SODIUM 100 MG/1
100 CAPSULE, LIQUID FILLED ORAL 2 TIMES DAILY
Qty: 60 CAPSULE | Refills: 0 | Status: SHIPPED | OUTPATIENT
Start: 2021-03-24 | End: 2021-03-25 | Stop reason: ALTCHOICE

## 2021-03-24 RX ORDER — POLYETHYLENE GLYCOL 3350 17 G/17G
17 POWDER, FOR SOLUTION ORAL DAILY
Status: DISCONTINUED | OUTPATIENT
Start: 2021-03-24 | End: 2021-03-24 | Stop reason: HOSPADM

## 2021-03-24 RX ORDER — DOCUSATE SODIUM 100 MG/1
100 CAPSULE, LIQUID FILLED ORAL 2 TIMES DAILY
Status: DISCONTINUED | OUTPATIENT
Start: 2021-03-24 | End: 2021-03-24 | Stop reason: HOSPADM

## 2021-03-24 RX ORDER — TRAMADOL HYDROCHLORIDE 50 MG/1
50 TABLET ORAL EVERY 6 HOURS PRN
Qty: 15 TABLET | Refills: 0 | Status: SHIPPED | OUTPATIENT
Start: 2021-03-24 | End: 2021-03-24 | Stop reason: HOSPADM

## 2021-03-24 RX ORDER — POLYETHYLENE GLYCOL 3350 17 G/17G
17 POWDER, FOR SOLUTION ORAL DAILY
Qty: 30 EACH | Refills: 0 | Status: SHIPPED | OUTPATIENT
Start: 2021-03-25 | End: 2021-03-25

## 2021-03-24 RX ADMIN — METHOCARBAMOL 500 MG: 500 TABLET ORAL at 08:03

## 2021-03-24 RX ADMIN — GABAPENTIN 300 MG: 300 CAPSULE ORAL at 08:03

## 2021-03-24 RX ADMIN — PANTOPRAZOLE SODIUM 40 MG: 40 TABLET, DELAYED RELEASE ORAL at 08:03

## 2021-03-24 RX ADMIN — ASPIRIN 81 MG: 81 TABLET, CHEWABLE ORAL at 08:03

## 2021-03-24 RX ADMIN — DOCUSATE SODIUM 100 MG: 100 CAPSULE, LIQUID FILLED ORAL at 01:03

## 2021-03-24 RX ADMIN — ALLOPURINOL 300 MG: 300 TABLET ORAL at 08:03

## 2021-03-24 RX ADMIN — LEVOTHYROXINE SODIUM 100 MCG: 100 TABLET ORAL at 05:03

## 2021-03-24 RX ADMIN — CEFTRIAXONE SODIUM 1 G: 1 INJECTION, POWDER, FOR SOLUTION INTRAMUSCULAR; INTRAVENOUS at 01:03

## 2021-03-24 RX ADMIN — CARVEDILOL 3.12 MG: 3.12 TABLET, FILM COATED ORAL at 08:03

## 2021-03-24 RX ADMIN — IBUPROFEN 400 MG: 400 TABLET, FILM COATED ORAL at 08:03

## 2021-03-24 RX ADMIN — ACETAMINOPHEN 1000 MG: 10 INJECTION INTRAVENOUS at 05:03

## 2021-03-25 ENCOUNTER — OFFICE VISIT (OUTPATIENT)
Dept: FAMILY MEDICINE | Facility: CLINIC | Age: 71
End: 2021-03-25
Payer: MEDICARE

## 2021-03-25 VITALS
HEIGHT: 62 IN | SYSTOLIC BLOOD PRESSURE: 132 MMHG | BODY MASS INDEX: 27.79 KG/M2 | HEART RATE: 64 BPM | DIASTOLIC BLOOD PRESSURE: 82 MMHG | WEIGHT: 151 LBS

## 2021-03-25 DIAGNOSIS — E03.9 HYPOTHYROIDISM (ACQUIRED): ICD-10-CM

## 2021-03-25 DIAGNOSIS — D50.8 IRON DEFICIENCY ANEMIA SECONDARY TO INADEQUATE DIETARY IRON INTAKE: ICD-10-CM

## 2021-03-25 DIAGNOSIS — J02.9 PHARYNGITIS, UNSPECIFIED ETIOLOGY: Primary | ICD-10-CM

## 2021-03-25 DIAGNOSIS — E11.9 TYPE 2 DIABETES MELLITUS WITHOUT COMPLICATION, WITHOUT LONG-TERM CURRENT USE OF INSULIN: ICD-10-CM

## 2021-03-25 DIAGNOSIS — I10 ESSENTIAL HYPERTENSION: ICD-10-CM

## 2021-03-25 DIAGNOSIS — M10.9 GOUT, UNSPECIFIED CAUSE, UNSPECIFIED CHRONICITY, UNSPECIFIED SITE: ICD-10-CM

## 2021-03-25 DIAGNOSIS — M05.9 RHEUMATOID ARTHRITIS WITH POSITIVE RHEUMATOID FACTOR, INVOLVING UNSPECIFIED SITE: ICD-10-CM

## 2021-03-25 DIAGNOSIS — Z79.899 ENCOUNTER FOR LONG-TERM CURRENT USE OF HIGH RISK MEDICATION: ICD-10-CM

## 2021-03-25 DIAGNOSIS — M54.16 LUMBAR RADICULOPATHY: ICD-10-CM

## 2021-03-25 DIAGNOSIS — E78.2 MIXED HYPERLIPIDEMIA: ICD-10-CM

## 2021-03-25 LAB — BACTERIA THROAT CULT: NORMAL

## 2021-03-25 PROCEDURE — 99214 OFFICE O/P EST MOD 30 MIN: CPT | Mod: S$GLB,,, | Performed by: FAMILY MEDICINE

## 2021-03-25 PROCEDURE — 99214 PR OFFICE/OUTPT VISIT, EST, LEVL IV, 30-39 MIN: ICD-10-PCS | Mod: S$GLB,,, | Performed by: FAMILY MEDICINE

## 2021-03-25 RX ORDER — ALLOPURINOL 300 MG/1
300 TABLET ORAL 2 TIMES DAILY
Qty: 180 TABLET | Refills: 1 | Status: SHIPPED | OUTPATIENT
Start: 2021-03-25 | End: 2021-11-18 | Stop reason: SDUPTHER

## 2021-03-25 RX ORDER — GABAPENTIN 300 MG/1
300 CAPSULE ORAL 2 TIMES DAILY
Qty: 180 CAPSULE | Refills: 1 | Status: SHIPPED | OUTPATIENT
Start: 2021-03-25 | End: 2021-04-26 | Stop reason: SDUPTHER

## 2021-03-25 RX ORDER — LEVOTHYROXINE SODIUM 150 UG/1
150 TABLET ORAL
Qty: 90 TABLET | Refills: 1 | Status: SHIPPED | OUTPATIENT
Start: 2021-03-25 | End: 2021-07-26 | Stop reason: SDUPTHER

## 2021-03-25 RX ORDER — DESLORATADINE 5 MG/1
5 TABLET ORAL DAILY
Qty: 90 TABLET | Refills: 1 | Status: SHIPPED | OUTPATIENT
Start: 2021-03-25 | End: 2021-11-18 | Stop reason: SDUPTHER

## 2021-03-28 LAB
BACTERIA BLD CULT: NORMAL
BACTERIA BLD CULT: NORMAL

## 2021-03-29 DIAGNOSIS — E11.9 TYPE 2 DIABETES MELLITUS WITHOUT COMPLICATION, WITHOUT LONG-TERM CURRENT USE OF INSULIN: ICD-10-CM

## 2021-03-30 RX ORDER — METFORMIN HYDROCHLORIDE 500 MG/1
TABLET, EXTENDED RELEASE ORAL
Qty: 90 TABLET | Refills: 3 | OUTPATIENT
Start: 2021-03-30

## 2021-04-20 ENCOUNTER — LAB VISIT (OUTPATIENT)
Dept: LAB | Facility: HOSPITAL | Age: 71
End: 2021-04-20
Attending: INTERNAL MEDICINE
Payer: MEDICARE

## 2021-04-20 ENCOUNTER — OFFICE VISIT (OUTPATIENT)
Dept: RHEUMATOLOGY | Facility: CLINIC | Age: 71
End: 2021-04-20
Payer: MEDICARE

## 2021-04-20 VITALS — SYSTOLIC BLOOD PRESSURE: 132 MMHG | WEIGHT: 154.19 LBS | DIASTOLIC BLOOD PRESSURE: 75 MMHG | BODY MASS INDEX: 28.2 KG/M2

## 2021-04-20 DIAGNOSIS — D53.1 MEGALOBLASTIC ANEMIA: ICD-10-CM

## 2021-04-20 DIAGNOSIS — M06.9 RHEUMATOID ARTHRITIS, INVOLVING UNSPECIFIED SITE, UNSPECIFIED WHETHER RHEUMATOID FACTOR PRESENT: Primary | ICD-10-CM

## 2021-04-20 DIAGNOSIS — M06.9 RHEUMATOID ARTHRITIS, INVOLVING UNSPECIFIED SITE, UNSPECIFIED WHETHER RHEUMATOID FACTOR PRESENT: ICD-10-CM

## 2021-04-20 DIAGNOSIS — D64.9 ANEMIA, UNSPECIFIED TYPE: ICD-10-CM

## 2021-04-20 DIAGNOSIS — D64.9 ANEMIA, UNSPECIFIED TYPE: Primary | ICD-10-CM

## 2021-04-20 LAB
ALBUMIN SERPL BCP-MCNC: 3.7 G/DL (ref 3.5–5.2)
ALP SERPL-CCNC: 65 U/L (ref 55–135)
ALT SERPL W/O P-5'-P-CCNC: 11 U/L (ref 10–44)
ANION GAP SERPL CALC-SCNC: 8 MMOL/L (ref 8–16)
AST SERPL-CCNC: 22 U/L (ref 10–40)
BASOPHILS # BLD AUTO: 0.07 K/UL (ref 0–0.2)
BASOPHILS NFR BLD: 1 % (ref 0–1.9)
BILIRUB SERPL-MCNC: 0.8 MG/DL (ref 0.1–1)
BUN SERPL-MCNC: 13 MG/DL (ref 8–23)
CALCIUM SERPL-MCNC: 9.5 MG/DL (ref 8.7–10.5)
CHLORIDE SERPL-SCNC: 109 MMOL/L (ref 95–110)
CO2 SERPL-SCNC: 24 MMOL/L (ref 23–29)
CREAT SERPL-MCNC: 0.8 MG/DL (ref 0.5–1.4)
CRP SERPL-MCNC: 0.1 MG/DL
DIFFERENTIAL METHOD: ABNORMAL
EOSINOPHIL # BLD AUTO: 0.6 K/UL (ref 0–0.5)
EOSINOPHIL NFR BLD: 8.5 % (ref 0–8)
ERYTHROCYTE [DISTWIDTH] IN BLOOD BY AUTOMATED COUNT: 15.1 % (ref 11.5–14.5)
EST. GFR  (AFRICAN AMERICAN): >60 ML/MIN/1.73 M^2
EST. GFR  (NON AFRICAN AMERICAN): >60 ML/MIN/1.73 M^2
GLUCOSE SERPL-MCNC: 91 MG/DL (ref 70–110)
HCT VFR BLD AUTO: 33.4 % (ref 37–48.5)
HGB BLD-MCNC: 10.9 G/DL (ref 12–16)
IMM GRANULOCYTES # BLD AUTO: 0.02 K/UL (ref 0–0.04)
IMM GRANULOCYTES NFR BLD AUTO: 0.3 % (ref 0–0.5)
LYMPHOCYTES # BLD AUTO: 1.5 K/UL (ref 1–4.8)
LYMPHOCYTES NFR BLD: 21.4 % (ref 18–48)
MCH RBC QN AUTO: 33 PG (ref 27–31)
MCHC RBC AUTO-ENTMCNC: 32.6 G/DL (ref 32–36)
MCV RBC AUTO: 101 FL (ref 82–98)
MONOCYTES # BLD AUTO: 0.6 K/UL (ref 0.3–1)
MONOCYTES NFR BLD: 8.2 % (ref 4–15)
NEUTROPHILS # BLD AUTO: 4.3 K/UL (ref 1.8–7.7)
NEUTROPHILS NFR BLD: 60.6 % (ref 38–73)
NRBC BLD-RTO: 0 /100 WBC
PLATELET # BLD AUTO: 352 K/UL (ref 150–450)
PMV BLD AUTO: 9.8 FL (ref 9.2–12.9)
POTASSIUM SERPL-SCNC: 4 MMOL/L (ref 3.5–5.1)
PROT SERPL-MCNC: 6.9 G/DL (ref 6–8.4)
RBC # BLD AUTO: 3.3 M/UL (ref 4–5.4)
SODIUM SERPL-SCNC: 141 MMOL/L (ref 136–145)
WBC # BLD AUTO: 7.05 K/UL (ref 3.9–12.7)

## 2021-04-20 PROCEDURE — 99213 PR OFFICE/OUTPT VISIT, EST, LEVL III, 20-29 MIN: ICD-10-PCS | Mod: S$GLB,,, | Performed by: INTERNAL MEDICINE

## 2021-04-20 PROCEDURE — 80053 COMPREHEN METABOLIC PANEL: CPT | Performed by: INTERNAL MEDICINE

## 2021-04-20 PROCEDURE — 86140 C-REACTIVE PROTEIN: CPT | Performed by: INTERNAL MEDICINE

## 2021-04-20 PROCEDURE — 99213 OFFICE O/P EST LOW 20 MIN: CPT | Mod: S$GLB,,, | Performed by: INTERNAL MEDICINE

## 2021-04-20 PROCEDURE — 36415 COLL VENOUS BLD VENIPUNCTURE: CPT | Performed by: INTERNAL MEDICINE

## 2021-04-20 PROCEDURE — 85025 COMPLETE CBC W/AUTO DIFF WBC: CPT | Performed by: INTERNAL MEDICINE

## 2021-04-26 DIAGNOSIS — M54.16 LUMBAR RADICULOPATHY: ICD-10-CM

## 2021-04-26 RX ORDER — GABAPENTIN 300 MG/1
300 CAPSULE ORAL 2 TIMES DAILY
Qty: 180 CAPSULE | Refills: 1 | Status: SHIPPED | OUTPATIENT
Start: 2021-04-26 | End: 2021-07-26 | Stop reason: SDUPTHER

## 2021-04-28 ENCOUNTER — TELEPHONE (OUTPATIENT)
Dept: FAMILY MEDICINE | Facility: CLINIC | Age: 71
End: 2021-04-28

## 2021-04-29 RX ORDER — SULFAMETHOXAZOLE AND TRIMETHOPRIM 400; 80 MG/1; MG/1
1 TABLET ORAL 2 TIMES DAILY
Qty: 6 TABLET | Refills: 0 | Status: SHIPPED | OUTPATIENT
Start: 2021-04-29 | End: 2021-05-02

## 2021-05-19 PROBLEM — D64.9 ANEMIA, UNSPECIFIED: Status: ACTIVE | Noted: 2021-05-19

## 2021-05-19 PROBLEM — D75.89 MACROCYTOSIS: Status: ACTIVE | Noted: 2021-05-19

## 2021-06-07 ENCOUNTER — TELEPHONE (OUTPATIENT)
Dept: FAMILY MEDICINE | Facility: CLINIC | Age: 71
End: 2021-06-07

## 2021-06-24 ENCOUNTER — TELEPHONE (OUTPATIENT)
Dept: FAMILY MEDICINE | Facility: CLINIC | Age: 71
End: 2021-06-24

## 2021-06-24 RX ORDER — PREDNISONE 20 MG/1
40 TABLET ORAL DAILY
Qty: 10 TABLET | Refills: 0 | Status: SHIPPED | OUTPATIENT
Start: 2021-06-24 | End: 2021-06-29

## 2021-06-28 PROBLEM — J01.90 ACUTE SINUSITIS: Status: RESOLVED | Noted: 2021-03-23 | Resolved: 2021-06-28

## 2021-07-26 ENCOUNTER — OFFICE VISIT (OUTPATIENT)
Dept: FAMILY MEDICINE | Facility: CLINIC | Age: 71
End: 2021-07-26
Payer: MEDICARE

## 2021-07-26 VITALS
BODY MASS INDEX: 27.79 KG/M2 | DIASTOLIC BLOOD PRESSURE: 80 MMHG | SYSTOLIC BLOOD PRESSURE: 112 MMHG | WEIGHT: 151 LBS | HEART RATE: 63 BPM | HEIGHT: 62 IN

## 2021-07-26 DIAGNOSIS — I10 ESSENTIAL HYPERTENSION: ICD-10-CM

## 2021-07-26 DIAGNOSIS — E11.9 TYPE 2 DIABETES MELLITUS WITHOUT COMPLICATION, WITHOUT LONG-TERM CURRENT USE OF INSULIN: Primary | ICD-10-CM

## 2021-07-26 DIAGNOSIS — E03.9 HYPOTHYROIDISM (ACQUIRED): ICD-10-CM

## 2021-07-26 DIAGNOSIS — K21.9 GASTROESOPHAGEAL REFLUX DISEASE WITHOUT ESOPHAGITIS: ICD-10-CM

## 2021-07-26 DIAGNOSIS — E78.2 MIXED HYPERLIPIDEMIA: ICD-10-CM

## 2021-07-26 DIAGNOSIS — M54.16 LUMBAR RADICULOPATHY: ICD-10-CM

## 2021-07-26 DIAGNOSIS — E11.59 TYPE 2 DIABETES MELLITUS WITH OTHER CIRCULATORY COMPLICATIONS: ICD-10-CM

## 2021-07-26 LAB — HBA1C MFR BLD: 4.7 %

## 2021-07-26 PROCEDURE — 83036 POCT HEMOGLOBIN A1C: ICD-10-PCS | Mod: QW,,, | Performed by: FAMILY MEDICINE

## 2021-07-26 PROCEDURE — 83036 HEMOGLOBIN GLYCOSYLATED A1C: CPT | Mod: QW,,, | Performed by: FAMILY MEDICINE

## 2021-07-26 PROCEDURE — 99214 OFFICE O/P EST MOD 30 MIN: CPT | Mod: S$GLB,,, | Performed by: FAMILY MEDICINE

## 2021-07-26 PROCEDURE — 99214 PR OFFICE/OUTPT VISIT, EST, LEVL IV, 30-39 MIN: ICD-10-PCS | Mod: S$GLB,,, | Performed by: FAMILY MEDICINE

## 2021-07-26 RX ORDER — LEVOTHYROXINE SODIUM 150 UG/1
150 TABLET ORAL
Qty: 90 TABLET | Refills: 1 | Status: SHIPPED | OUTPATIENT
Start: 2021-07-26 | End: 2021-11-18 | Stop reason: SDUPTHER

## 2021-07-26 RX ORDER — GABAPENTIN 300 MG/1
300 CAPSULE ORAL 2 TIMES DAILY
Qty: 180 CAPSULE | Refills: 1 | Status: SHIPPED | OUTPATIENT
Start: 2021-07-26 | End: 2021-11-18 | Stop reason: SDUPTHER

## 2021-07-26 RX ORDER — OMEPRAZOLE 40 MG/1
40 CAPSULE, DELAYED RELEASE ORAL DAILY
Qty: 90 CAPSULE | Refills: 1 | Status: SHIPPED | OUTPATIENT
Start: 2021-07-26 | End: 2021-11-18 | Stop reason: SDUPTHER

## 2021-07-27 ENCOUNTER — OFFICE VISIT (OUTPATIENT)
Dept: RHEUMATOLOGY | Facility: CLINIC | Age: 71
End: 2021-07-27
Payer: MEDICARE

## 2021-07-27 ENCOUNTER — LAB VISIT (OUTPATIENT)
Dept: LAB | Facility: HOSPITAL | Age: 71
End: 2021-07-27
Attending: INTERNAL MEDICINE
Payer: MEDICARE

## 2021-07-27 VITALS — BODY MASS INDEX: 29.17 KG/M2 | DIASTOLIC BLOOD PRESSURE: 68 MMHG | WEIGHT: 159.5 LBS | SYSTOLIC BLOOD PRESSURE: 123 MMHG

## 2021-07-27 DIAGNOSIS — M06.9 RHEUMATOID ARTHRITIS, INVOLVING UNSPECIFIED SITE, UNSPECIFIED WHETHER RHEUMATOID FACTOR PRESENT: ICD-10-CM

## 2021-07-27 DIAGNOSIS — M06.9 RHEUMATOID ARTHRITIS, INVOLVING UNSPECIFIED SITE, UNSPECIFIED WHETHER RHEUMATOID FACTOR PRESENT: Primary | ICD-10-CM

## 2021-07-27 LAB
ALBUMIN SERPL BCP-MCNC: 3.7 G/DL (ref 3.5–5.2)
ALP SERPL-CCNC: 60 U/L (ref 55–135)
ALT SERPL W/O P-5'-P-CCNC: 11 U/L (ref 10–44)
ANION GAP SERPL CALC-SCNC: 8 MMOL/L (ref 8–16)
AST SERPL-CCNC: 20 U/L (ref 10–40)
BASOPHILS # BLD AUTO: 0.06 K/UL (ref 0–0.2)
BASOPHILS NFR BLD: 1.1 % (ref 0–1.9)
BILIRUB SERPL-MCNC: 0.8 MG/DL (ref 0.1–1)
BUN SERPL-MCNC: 19 MG/DL (ref 8–23)
CALCIUM SERPL-MCNC: 9.1 MG/DL (ref 8.7–10.5)
CHLORIDE SERPL-SCNC: 111 MMOL/L (ref 95–110)
CO2 SERPL-SCNC: 22 MMOL/L (ref 23–29)
CREAT SERPL-MCNC: 1 MG/DL (ref 0.5–1.4)
CRP SERPL-MCNC: 0.09 MG/DL
DIFFERENTIAL METHOD: ABNORMAL
EOSINOPHIL # BLD AUTO: 0.6 K/UL (ref 0–0.5)
EOSINOPHIL NFR BLD: 10.3 % (ref 0–8)
ERYTHROCYTE [DISTWIDTH] IN BLOOD BY AUTOMATED COUNT: 13.7 % (ref 11.5–14.5)
EST. GFR  (AFRICAN AMERICAN): >60 ML/MIN/1.73 M^2
EST. GFR  (NON AFRICAN AMERICAN): 56.4 ML/MIN/1.73 M^2
GLUCOSE SERPL-MCNC: 93 MG/DL (ref 70–110)
HCT VFR BLD AUTO: 31.6 % (ref 37–48.5)
HGB BLD-MCNC: 10.5 G/DL (ref 12–16)
IMM GRANULOCYTES # BLD AUTO: 0.01 K/UL (ref 0–0.04)
IMM GRANULOCYTES NFR BLD AUTO: 0.2 % (ref 0–0.5)
LYMPHOCYTES # BLD AUTO: 1.4 K/UL (ref 1–4.8)
LYMPHOCYTES NFR BLD: 26.4 % (ref 18–48)
MCH RBC QN AUTO: 35.7 PG (ref 27–31)
MCHC RBC AUTO-ENTMCNC: 33.2 G/DL (ref 32–36)
MCV RBC AUTO: 108 FL (ref 82–98)
MONOCYTES # BLD AUTO: 0.6 K/UL (ref 0.3–1)
MONOCYTES NFR BLD: 10.8 % (ref 4–15)
NEUTROPHILS # BLD AUTO: 2.8 K/UL (ref 1.8–7.7)
NEUTROPHILS NFR BLD: 51.2 % (ref 38–73)
NRBC BLD-RTO: 0 /100 WBC
PLATELET # BLD AUTO: 312 K/UL (ref 150–450)
PMV BLD AUTO: 9.9 FL (ref 9.2–12.9)
POTASSIUM SERPL-SCNC: 4.1 MMOL/L (ref 3.5–5.1)
PROT SERPL-MCNC: 6.6 G/DL (ref 6–8.4)
RBC # BLD AUTO: 2.94 M/UL (ref 4–5.4)
SODIUM SERPL-SCNC: 141 MMOL/L (ref 136–145)
WBC # BLD AUTO: 5.45 K/UL (ref 3.9–12.7)

## 2021-07-27 PROCEDURE — 86140 C-REACTIVE PROTEIN: CPT | Performed by: INTERNAL MEDICINE

## 2021-07-27 PROCEDURE — 99213 PR OFFICE/OUTPT VISIT, EST, LEVL III, 20-29 MIN: ICD-10-PCS | Mod: S$GLB,,, | Performed by: INTERNAL MEDICINE

## 2021-07-27 PROCEDURE — 80053 COMPREHEN METABOLIC PANEL: CPT | Performed by: INTERNAL MEDICINE

## 2021-07-27 PROCEDURE — 99213 OFFICE O/P EST LOW 20 MIN: CPT | Mod: S$GLB,,, | Performed by: INTERNAL MEDICINE

## 2021-07-27 PROCEDURE — 36415 COLL VENOUS BLD VENIPUNCTURE: CPT | Performed by: INTERNAL MEDICINE

## 2021-07-27 PROCEDURE — 85025 COMPLETE CBC W/AUTO DIFF WBC: CPT | Performed by: INTERNAL MEDICINE

## 2021-07-27 RX ORDER — TRAMADOL HYDROCHLORIDE 50 MG/1
50 TABLET ORAL EVERY 6 HOURS PRN
Qty: 90 TABLET | Refills: 2 | Status: SHIPPED | OUTPATIENT
Start: 2021-07-27 | End: 2022-08-24 | Stop reason: SDUPTHER

## 2021-07-28 ENCOUNTER — TELEPHONE (OUTPATIENT)
Dept: RHEUMATOLOGY | Facility: CLINIC | Age: 71
End: 2021-07-28

## 2021-07-28 DIAGNOSIS — M06.9 RHEUMATOID ARTHRITIS, INVOLVING UNSPECIFIED SITE, UNSPECIFIED WHETHER RHEUMATOID FACTOR PRESENT: ICD-10-CM

## 2021-07-28 DIAGNOSIS — Z79.899 ENCOUNTER FOR LONG-TERM (CURRENT) USE OF MEDICATIONS: ICD-10-CM

## 2021-07-28 DIAGNOSIS — D64.9 ANEMIA, UNSPECIFIED TYPE: Primary | ICD-10-CM

## 2021-08-09 ENCOUNTER — TELEPHONE (OUTPATIENT)
Dept: FAMILY MEDICINE | Facility: CLINIC | Age: 71
End: 2021-08-09

## 2021-08-12 ENCOUNTER — TELEPHONE (OUTPATIENT)
Dept: FAMILY MEDICINE | Facility: CLINIC | Age: 71
End: 2021-08-12

## 2021-08-18 RX ORDER — CEFPROZIL 250 MG/1
250 TABLET, FILM COATED ORAL 2 TIMES DAILY
Qty: 20 TABLET | Refills: 0 | Status: SHIPPED | OUTPATIENT
Start: 2021-08-18 | End: 2021-08-28

## 2021-08-24 ENCOUNTER — TELEPHONE (OUTPATIENT)
Dept: FAMILY MEDICINE | Facility: CLINIC | Age: 71
End: 2021-08-24

## 2021-09-20 ENCOUNTER — TELEPHONE (OUTPATIENT)
Dept: FAMILY MEDICINE | Facility: CLINIC | Age: 71
End: 2021-09-20

## 2021-09-20 DIAGNOSIS — R30.0 DYSURIA: Primary | ICD-10-CM

## 2021-10-13 DIAGNOSIS — R30.0 DYSURIA: Primary | ICD-10-CM

## 2021-10-14 RX ORDER — CEFPROZIL 500 MG/1
500 TABLET, FILM COATED ORAL EVERY 12 HOURS
Qty: 20 TABLET | Refills: 0 | Status: SHIPPED | OUTPATIENT
Start: 2021-10-14 | End: 2021-10-24

## 2021-11-18 ENCOUNTER — OFFICE VISIT (OUTPATIENT)
Dept: FAMILY MEDICINE | Facility: CLINIC | Age: 71
End: 2021-11-18
Payer: MEDICARE

## 2021-11-18 VITALS
DIASTOLIC BLOOD PRESSURE: 76 MMHG | HEIGHT: 62 IN | BODY MASS INDEX: 28.52 KG/M2 | WEIGHT: 155 LBS | SYSTOLIC BLOOD PRESSURE: 132 MMHG | HEART RATE: 64 BPM

## 2021-11-18 DIAGNOSIS — D50.8 IRON DEFICIENCY ANEMIA SECONDARY TO INADEQUATE DIETARY IRON INTAKE: ICD-10-CM

## 2021-11-18 DIAGNOSIS — M54.16 LUMBAR RADICULOPATHY: ICD-10-CM

## 2021-11-18 DIAGNOSIS — M10.9 GOUT, UNSPECIFIED CAUSE, UNSPECIFIED CHRONICITY, UNSPECIFIED SITE: ICD-10-CM

## 2021-11-18 DIAGNOSIS — E13.9 SECONDARY DIABETES: Primary | ICD-10-CM

## 2021-11-18 DIAGNOSIS — I10 ESSENTIAL HYPERTENSION: ICD-10-CM

## 2021-11-18 DIAGNOSIS — M05.79 RHEUMATOID ARTHRITIS INVOLVING MULTIPLE SITES WITH POSITIVE RHEUMATOID FACTOR: ICD-10-CM

## 2021-11-18 DIAGNOSIS — K21.9 GASTROESOPHAGEAL REFLUX DISEASE WITHOUT ESOPHAGITIS: ICD-10-CM

## 2021-11-18 DIAGNOSIS — E03.9 HYPOTHYROIDISM (ACQUIRED): ICD-10-CM

## 2021-11-18 DIAGNOSIS — E78.2 MIXED HYPERLIPIDEMIA: ICD-10-CM

## 2021-11-18 DIAGNOSIS — Z79.899 ENCOUNTER FOR LONG-TERM CURRENT USE OF HIGH RISK MEDICATION: ICD-10-CM

## 2021-11-18 DIAGNOSIS — Z23 NEEDS FLU SHOT: ICD-10-CM

## 2021-11-18 LAB — HBA1C MFR BLD: 4.8 %

## 2021-11-18 PROCEDURE — 99214 PR OFFICE/OUTPT VISIT, EST, LEVL IV, 30-39 MIN: ICD-10-PCS | Mod: 25,S$GLB,, | Performed by: FAMILY MEDICINE

## 2021-11-18 PROCEDURE — 83036 POCT HEMOGLOBIN A1C: ICD-10-PCS | Mod: QW,,, | Performed by: FAMILY MEDICINE

## 2021-11-18 PROCEDURE — 83036 HEMOGLOBIN GLYCOSYLATED A1C: CPT | Mod: QW,,, | Performed by: FAMILY MEDICINE

## 2021-11-18 PROCEDURE — G0008 ADMIN INFLUENZA VIRUS VAC: HCPCS | Mod: S$GLB,,, | Performed by: FAMILY MEDICINE

## 2021-11-18 PROCEDURE — 99214 OFFICE O/P EST MOD 30 MIN: CPT | Mod: 25,S$GLB,, | Performed by: FAMILY MEDICINE

## 2021-11-18 PROCEDURE — 90662 IIV NO PRSV INCREASED AG IM: CPT | Mod: S$GLB,,, | Performed by: FAMILY MEDICINE

## 2021-11-18 PROCEDURE — G0008 FLU VACCINE - QUADRIVALENT - HIGH DOSE (65+) PRESERVATIVE FREE IM: ICD-10-PCS | Mod: S$GLB,,, | Performed by: FAMILY MEDICINE

## 2021-11-18 PROCEDURE — 90662 FLU VACCINE - QUADRIVALENT - HIGH DOSE (65+) PRESERVATIVE FREE IM: ICD-10-PCS | Mod: S$GLB,,, | Performed by: FAMILY MEDICINE

## 2021-11-18 RX ORDER — METHOTREXATE 2.5 MG/1
20 TABLET ORAL
Qty: 32 TABLET | Refills: 5 | Status: SHIPPED | OUTPATIENT
Start: 2021-11-19 | End: 2022-05-31 | Stop reason: SDUPTHER

## 2021-11-18 RX ORDER — GABAPENTIN 300 MG/1
300 CAPSULE ORAL 2 TIMES DAILY
Qty: 180 CAPSULE | Refills: 1 | Status: SHIPPED | OUTPATIENT
Start: 2021-11-18 | End: 2022-05-31 | Stop reason: SDUPTHER

## 2021-11-18 RX ORDER — OMEPRAZOLE 40 MG/1
40 CAPSULE, DELAYED RELEASE ORAL DAILY
Qty: 90 CAPSULE | Refills: 1 | Status: SHIPPED | OUTPATIENT
Start: 2021-11-18 | End: 2022-05-31 | Stop reason: SDUPTHER

## 2021-11-18 RX ORDER — ALLOPURINOL 300 MG/1
300 TABLET ORAL 2 TIMES DAILY
Qty: 180 TABLET | Refills: 1 | Status: SHIPPED | OUTPATIENT
Start: 2021-11-18 | End: 2022-05-31 | Stop reason: SDUPTHER

## 2021-11-18 RX ORDER — DESLORATADINE 5 MG/1
5 TABLET ORAL DAILY
Qty: 90 TABLET | Refills: 1 | Status: SHIPPED | OUTPATIENT
Start: 2021-11-18 | End: 2022-05-31 | Stop reason: SDUPTHER

## 2021-11-18 RX ORDER — LEVOTHYROXINE SODIUM 150 UG/1
150 TABLET ORAL
Qty: 90 TABLET | Refills: 1 | Status: SHIPPED | OUTPATIENT
Start: 2021-11-18 | End: 2022-05-31 | Stop reason: SDUPTHER

## 2021-11-19 LAB
ALBUMIN SERPL-MCNC: 3.9 G/DL (ref 3.6–5.1)
ALBUMIN/GLOB SERPL: 1.2 (CALC) (ref 1–2.5)
ALP SERPL-CCNC: 89 U/L (ref 37–153)
ALT SERPL-CCNC: 5 U/L (ref 6–29)
AST SERPL-CCNC: 17 U/L (ref 10–35)
BASOPHILS # BLD AUTO: 99 CELLS/UL (ref 0–200)
BASOPHILS NFR BLD AUTO: 0.9 %
BILIRUB SERPL-MCNC: 0.6 MG/DL (ref 0.2–1.2)
BUN SERPL-MCNC: 17 MG/DL (ref 7–25)
BUN/CREAT SERPL: ABNORMAL (CALC) (ref 6–22)
CALCIUM SERPL-MCNC: 10 MG/DL (ref 8.6–10.4)
CHLORIDE SERPL-SCNC: 107 MMOL/L (ref 98–110)
CHOLEST SERPL-MCNC: 204 MG/DL
CHOLEST/HDLC SERPL: 3.3 (CALC)
CO2 SERPL-SCNC: 23 MMOL/L (ref 20–32)
CREAT SERPL-MCNC: 0.87 MG/DL (ref 0.6–0.93)
EOSINOPHIL # BLD AUTO: 506 CELLS/UL (ref 15–500)
EOSINOPHIL NFR BLD AUTO: 4.6 %
ERYTHROCYTE [DISTWIDTH] IN BLOOD BY AUTOMATED COUNT: 12.9 % (ref 11–15)
GLOBULIN SER CALC-MCNC: 3.2 G/DL (CALC) (ref 1.9–3.7)
GLUCOSE SERPL-MCNC: 90 MG/DL (ref 65–99)
HCT VFR BLD AUTO: 34.5 % (ref 35–45)
HDLC SERPL-MCNC: 62 MG/DL
HGB BLD-MCNC: 11.3 G/DL (ref 11.7–15.5)
IRON SERPL-MCNC: 95 MCG/DL (ref 45–160)
LDLC SERPL CALC-MCNC: 118 MG/DL (CALC)
LYMPHOCYTES # BLD AUTO: 1628 CELLS/UL (ref 850–3900)
LYMPHOCYTES NFR BLD AUTO: 14.8 %
MCH RBC QN AUTO: 32.1 PG (ref 27–33)
MCHC RBC AUTO-ENTMCNC: 32.8 G/DL (ref 32–36)
MCV RBC AUTO: 98 FL (ref 80–100)
MONOCYTES # BLD AUTO: 605 CELLS/UL (ref 200–950)
MONOCYTES NFR BLD AUTO: 5.5 %
NEUTROPHILS # BLD AUTO: 8162 CELLS/UL (ref 1500–7800)
NEUTROPHILS NFR BLD AUTO: 74.2 %
NONHDLC SERPL-MCNC: 142 MG/DL (CALC)
PLATELET # BLD AUTO: 460 THOUSAND/UL (ref 140–400)
PMV BLD REES-ECKER: 9.4 FL (ref 7.5–12.5)
POTASSIUM SERPL-SCNC: 4.1 MMOL/L (ref 3.5–5.3)
PROT SERPL-MCNC: 7.1 G/DL (ref 6.1–8.1)
RBC # BLD AUTO: 3.52 MILLION/UL (ref 3.8–5.1)
SODIUM SERPL-SCNC: 142 MMOL/L (ref 135–146)
TRIGL SERPL-MCNC: 126 MG/DL
TSH SERPL-ACNC: 4.39 MIU/L (ref 0.4–4.5)
URATE SERPL-MCNC: 2.6 MG/DL (ref 2.5–7)
VIT B12 SERPL-MCNC: 377 PG/ML (ref 200–1100)
WBC # BLD AUTO: 11 THOUSAND/UL (ref 3.8–10.8)

## 2022-01-05 ENCOUNTER — TELEPHONE (OUTPATIENT)
Dept: FAMILY MEDICINE | Facility: CLINIC | Age: 72
End: 2022-01-05
Payer: MEDICARE

## 2022-01-05 NOTE — TELEPHONE ENCOUNTER
Spoke with patient states grandson tested positive yesterday.  Patient c/o sore throat headaches and sinus congestion x3 days.  Has been in the presence of her grandson.  Patient instructed to complete covid testing.  Patient verbalized understanding -DN

## 2022-01-05 NOTE — TELEPHONE ENCOUNTER
----- Message from Pili Flannery sent at 1/5/2022 10:30 AM CST -----  Patient called and stated that her grandson tested positive for Covid yesterday and she need to know what she has to do please give her a call at [phone]

## 2022-03-11 ENCOUNTER — PES CALL (OUTPATIENT)
Dept: ADMINISTRATIVE | Facility: CLINIC | Age: 72
End: 2022-03-11
Payer: MEDICARE

## 2022-03-16 ENCOUNTER — PES CALL (OUTPATIENT)
Dept: ADMINISTRATIVE | Facility: CLINIC | Age: 72
End: 2022-03-16
Payer: MEDICARE

## 2022-05-17 ENCOUNTER — TELEPHONE (OUTPATIENT)
Dept: FAMILY MEDICINE | Facility: CLINIC | Age: 72
End: 2022-05-17

## 2022-05-17 NOTE — TELEPHONE ENCOUNTER
----- Message from Ngoc Larson sent at 5/16/2022  2:19 PM CDT -----  Pt's daughter calling said her mom has a scaly dry rash that is bleeding [phone]

## 2022-05-17 NOTE — TELEPHONE ENCOUNTER
Spoke with patients daughter c/o dry scaly patches on fingers.  States began on one finger, now is spreading to others.  x4 days.  Please advise -DN

## 2022-05-18 NOTE — TELEPHONE ENCOUNTER
Per Dr Mccord:  Ketoconazole ointment.  Needs visit also.      Left message for patients daughter to return call -DN

## 2022-05-24 RX ORDER — KETOCONAZOLE 20 MG/G
CREAM TOPICAL DAILY
Qty: 60 G | Refills: 1 | Status: SHIPPED | OUTPATIENT
Start: 2022-05-24 | End: 2022-05-31

## 2022-05-31 ENCOUNTER — OFFICE VISIT (OUTPATIENT)
Dept: FAMILY MEDICINE | Facility: CLINIC | Age: 72
End: 2022-05-31
Payer: COMMERCIAL

## 2022-05-31 VITALS
WEIGHT: 150 LBS | HEIGHT: 62 IN | HEART RATE: 72 BPM | BODY MASS INDEX: 27.6 KG/M2 | DIASTOLIC BLOOD PRESSURE: 60 MMHG | SYSTOLIC BLOOD PRESSURE: 90 MMHG

## 2022-05-31 DIAGNOSIS — M10.9 GOUT, UNSPECIFIED CAUSE, UNSPECIFIED CHRONICITY, UNSPECIFIED SITE: ICD-10-CM

## 2022-05-31 DIAGNOSIS — E78.2 MIXED HYPERLIPIDEMIA: ICD-10-CM

## 2022-05-31 DIAGNOSIS — K21.9 GASTROESOPHAGEAL REFLUX DISEASE WITHOUT ESOPHAGITIS: ICD-10-CM

## 2022-05-31 DIAGNOSIS — M05.79 RHEUMATOID ARTHRITIS INVOLVING MULTIPLE SITES WITH POSITIVE RHEUMATOID FACTOR: ICD-10-CM

## 2022-05-31 DIAGNOSIS — J30.9 CHRONIC ALLERGIC RHINITIS: ICD-10-CM

## 2022-05-31 DIAGNOSIS — L30.8 PSORIASIFORM ECZEMA: Primary | ICD-10-CM

## 2022-05-31 DIAGNOSIS — D50.8 IRON DEFICIENCY ANEMIA SECONDARY TO INADEQUATE DIETARY IRON INTAKE: ICD-10-CM

## 2022-05-31 DIAGNOSIS — M54.16 LUMBAR RADICULOPATHY: ICD-10-CM

## 2022-05-31 DIAGNOSIS — E03.9 HYPOTHYROIDISM (ACQUIRED): ICD-10-CM

## 2022-05-31 DIAGNOSIS — I10 ESSENTIAL HYPERTENSION: ICD-10-CM

## 2022-05-31 PROCEDURE — 99214 PR OFFICE/OUTPT VISIT, EST, LEVL IV, 30-39 MIN: ICD-10-PCS | Mod: S$GLB,,, | Performed by: FAMILY MEDICINE

## 2022-05-31 PROCEDURE — 99214 OFFICE O/P EST MOD 30 MIN: CPT | Mod: S$GLB,,, | Performed by: FAMILY MEDICINE

## 2022-05-31 RX ORDER — TRIAMCINOLONE ACETONIDE 0.25 MG/G
CREAM TOPICAL
Qty: 80 G | Refills: 4 | Status: SHIPPED | OUTPATIENT
Start: 2022-05-31

## 2022-05-31 RX ORDER — METHOTREXATE 2.5 MG/1
20 TABLET ORAL
Qty: 32 TABLET | Refills: 5 | Status: SHIPPED | OUTPATIENT
Start: 2022-06-03 | End: 2022-11-30 | Stop reason: SDUPTHER

## 2022-05-31 RX ORDER — OMEPRAZOLE 40 MG/1
40 CAPSULE, DELAYED RELEASE ORAL DAILY
Qty: 90 CAPSULE | Refills: 1 | Status: SHIPPED | OUTPATIENT
Start: 2022-05-31 | End: 2022-11-30 | Stop reason: SDUPTHER

## 2022-05-31 RX ORDER — LEVOTHYROXINE SODIUM 150 UG/1
150 TABLET ORAL
Qty: 90 TABLET | Refills: 1 | Status: SHIPPED | OUTPATIENT
Start: 2022-05-31 | End: 2022-11-30 | Stop reason: SDUPTHER

## 2022-05-31 RX ORDER — PRAVASTATIN SODIUM 40 MG/1
40 TABLET ORAL DAILY
Qty: 90 TABLET | Refills: 3 | Status: SHIPPED | OUTPATIENT
Start: 2022-05-31 | End: 2023-05-30 | Stop reason: SDUPTHER

## 2022-05-31 RX ORDER — DESLORATADINE 5 MG/1
5 TABLET ORAL DAILY
Qty: 90 TABLET | Refills: 1 | Status: SHIPPED | OUTPATIENT
Start: 2022-05-31 | End: 2023-06-10

## 2022-05-31 RX ORDER — ALLOPURINOL 300 MG/1
300 TABLET ORAL 2 TIMES DAILY
Qty: 180 TABLET | Refills: 1 | Status: SHIPPED | OUTPATIENT
Start: 2022-05-31 | End: 2022-11-30 | Stop reason: SDUPTHER

## 2022-05-31 RX ORDER — GABAPENTIN 300 MG/1
300 CAPSULE ORAL 2 TIMES DAILY
Qty: 180 CAPSULE | Refills: 1 | Status: SHIPPED | OUTPATIENT
Start: 2022-05-31 | End: 2022-11-30 | Stop reason: SDUPTHER

## 2022-05-31 NOTE — PROGRESS NOTES
SUBJECTIVE:    Patient ID: Lizette Palafox is a 71 y.o. female.    Chief Complaint: Rash (On hands and elbow very itchy x2 weeks, has used Nizoral that is not working, went over meds verbally// SW)    Patient with RA reports poor appetite recently due to joint pain.She is considering having a knee replacement for her discomfort.  She reports a 2 week history of itchy rash to her hands and elbows.  Initially prescribed Nizoral for concerns of tinea.  Did help some with the itching but the rash still present. She reports no blistering just scaling.  She continues on her usual medications.  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  Now remains on methotrexate and gabapentin.She continues to follow [facility] rheumatologist.   No recent gout flares.  She is on high dose allopurinol.  Blood pressure has been acceptable. No problems with her medications        No visits with results within 6 Month(s) from this visit.   Latest known visit with results is:   Office Visit on 11/18/2021   Component Date Value Ref Range Status    Hemoglobin A1C 11/18/2021 4.8  % Final    WBC 11/18/2021 11.0 (A) 3.8 - 10.8 Thousand/uL Final    RBC 11/18/2021 3.52 (A) 3.80 - 5.10 Million/uL Final    Hemoglobin 11/18/2021 11.3 (A) 11.7 - 15.5 g/dL Final    Hematocrit 11/18/2021 34.5 (A) 35.0 - 45.0 % Final    MCV 11/18/2021 98.0  80.0 - 100.0 fL Final    MCH 11/18/2021 32.1  27.0 - 33.0 pg Final    MCHC 11/18/2021 32.8  32.0 - 36.0 g/dL Final    RDW 11/18/2021 12.9  11.0 - 15.0 % Final    Platelets 11/18/2021 460 (A) 140 - 400 Thousand/uL Final    MPV 11/18/2021 9.4  7.5 - 12.5 fL Final    Neutrophils, Abs 11/18/2021 8,162 (A) 1,500 - 7,800 cells/uL Final    Lymph # 11/18/2021 1,628  850 - 3,900 cells/uL Final    Mono # 11/18/2021 605  200 - 950 cells/uL Final    Eos # 11/18/2021 506 (A) 15 - 500 cells/uL Final    Baso # 11/18/2021 99  0 - 200 cells/uL Final    Neutrophils Relative 11/18/2021 74.2  %  Final    Lymph % 11/18/2021 14.8  % Final    Mono % 11/18/2021 5.5  % Final    Eosinophil % 11/18/2021 4.6  % Final    Basophil % 11/18/2021 0.9  % Final    Iron 11/18/2021 95  45 - 160 mcg/dL Final    Cholesterol 11/18/2021 204 (A) <200 mg/dL Final    HDL 11/18/2021 62  > OR = 50 mg/dL Final    Triglycerides 11/18/2021 126  <150 mg/dL Final    LDL Cholesterol 11/18/2021 118 (A) mg/dL (calc) Final    HDL/Cholesterol Ratio 11/18/2021 3.3  <5.0 (calc) Final    Non HDL Chol. (LDL+VLDL) 11/18/2021 142 (A) <130 mg/dL (calc) Final    Glucose 11/18/2021 90  65 - 99 mg/dL Final    BUN 11/18/2021 17  7 - 25 mg/dL Final    Creatinine 11/18/2021 0.87  0.60 - 0.93 mg/dL Final    eGFR if non  11/18/2021 67  > OR = 60 mL/min/1.73m2 Final    eGFR if  11/18/2021 77  > OR = 60 mL/min/1.73m2 Final    BUN/Creatinine Ratio 11/18/2021 NOT APPLICABLE  6 - 22 (calc) Final    Sodium 11/18/2021 142  135 - 146 mmol/L Final    Potassium 11/18/2021 4.1  3.5 - 5.3 mmol/L Final    Chloride 11/18/2021 107  98 - 110 mmol/L Final    CO2 11/18/2021 23  20 - 32 mmol/L Final    Calcium 11/18/2021 10.0  8.6 - 10.4 mg/dL Final    Total Protein 11/18/2021 7.1  6.1 - 8.1 g/dL Final    Albumin 11/18/2021 3.9  3.6 - 5.1 g/dL Final    Globulin, Total 11/18/2021 3.2  1.9 - 3.7 g/dL (calc) Final    Albumin/Globulin Ratio 11/18/2021 1.2  1.0 - 2.5 (calc) Final    Total Bilirubin 11/18/2021 0.6  0.2 - 1.2 mg/dL Final    Alkaline Phosphatase 11/18/2021 89  37 - 153 U/L Final    AST 11/18/2021 17  10 - 35 U/L Final    ALT 11/18/2021 5 (A) 6 - 29 U/L Final    TSH w/reflex to FT4 11/18/2021 4.39  0.40 - 4.50 mIU/L Final    Uric Acid 11/18/2021 2.6  2.5 - 7.0 mg/dL Final    Vitamin B-12 11/18/2021 377  200 - 1,100 pg/mL Final       Past Medical History:   Diagnosis Date    Anemia, unspecified 5/19/2021    Arthritis     Rheumatoid    Encounter for blood transfusion     GERD (gastroesophageal reflux disease)     Gout     Hypertension      Macrocytosis 2021    Thyroid disease     Transfusion reaction      Past Surgical History:   Procedure Laterality Date    APPENDECTOMY       SECTION      x3    DILATION AND CURETTAGE OF UTERUS      GALLBLADDER SURGERY      HYSTERECTOMY      TOTAL HIP ARTHROPLASTY       Family History   Problem Relation Age of Onset    No Known Problems Mother     No Known Problems Father        Marital Status: Legally   Alcohol History:  reports previous alcohol use.  Tobacco History:  reports that she has never smoked. She has never used smokeless tobacco.  Drug History:  reports no history of drug use.    Review of patient's allergies indicates:   Allergen Reactions    Levaquin [levofloxacin] Hives and Edema    Tramadol Nausea And Vomiting       Current Outpatient Medications:     aspirin 81 MG Chew, Take 81 mg by mouth once daily. , Disp: , Rfl:     carvediloL (COREG) 3.125 MG tablet, TAKE 1 TABLET(3.125 MG) BY MOUTH TWICE DAILY WITH MEALS (Patient taking differently: Take 3.125 mg by mouth 2 (two) times daily with meals.), Disp: 180 tablet, Rfl: 3    ferrous sulfate (IRON ORAL), Take 1 tablet by mouth once daily., Disp: , Rfl:     traMADoL (ULTRAM) 50 mg tablet, Take 1 tablet (50 mg total) by mouth every 6 (six) hours as needed for Pain., Disp: 90 tablet, Rfl: 2    vitamin D (VITAMIN D3) 1000 units Tab, Take 2,000 Units by mouth once daily. , Disp: , Rfl:     allopurinoL (ZYLOPRIM) 300 MG tablet, Take 1 tablet (300 mg total) by mouth 2 (two) times daily. For gout prevention, Disp: 180 tablet, Rfl: 1    desloratadine (CLARINEX) 5 mg tablet, Take 1 tablet (5 mg total) by mouth once daily. For allergies, Disp: 90 tablet, Rfl: 1    gabapentin (NEURONTIN) 300 MG capsule, Take 1 capsule (300 mg total) by mouth 2 (two) times daily. For nerve pain, Disp: 180 capsule, Rfl: 1    levothyroxine (SYNTHROID) 150 MCG tablet, Take 1 tablet (150 mcg total) by mouth before breakfast. For thryoid, Disp: 90 tablet, Rfl: 1     "[START ON 6/3/2022] methotrexate 2.5 MG Tab, Take 8 tablets (20 mg total) by mouth Every Friday., Disp: 32 tablet, Rfl: 5    omeprazole (PRILOSEC) 40 MG capsule, Take 1 capsule (40 mg total) by mouth once daily. For heartburn, Disp: 90 capsule, Rfl: 1    pravastatin (PRAVACHOL) 40 MG tablet, Take 1 tablet (40 mg total) by mouth once daily., Disp: 90 tablet, Rfl: 3    triamcinolone acetonide 0.025% (KENALOG) 0.025 % cream, Apply a thin layer to affected areas 1-2 times a day when having a flare up, Disp: 80 g, Rfl: 4    valsartan (DIOVAN) 160 MG tablet, Take 1 tablet (160 mg total) by mouth once daily., Disp: 90 tablet, Rfl: 3    Review of Systems   Constitutional:  Positive for activity change and fatigue. Negative for unexpected weight change.   HENT:  Negative for hearing loss, postnasal drip, sinus pressure, sore throat and voice change.    Eyes:  Negative for photophobia and visual disturbance.   Respiratory:  Negative for cough, shortness of breath and wheezing.    Cardiovascular:  Negative for chest pain and palpitations.   Gastrointestinal:  Negative for constipation, diarrhea and nausea.   Genitourinary:  Negative for difficulty urinating, frequency, hematuria and urgency.   Musculoskeletal:  Positive for arthralgias and gait problem. Negative for back pain.   Skin:  Negative for rash.   Neurological:  Positive for headaches. Negative for weakness and light-headedness.   Hematological:  Negative for adenopathy. Does not bruise/bleed easily.   Psychiatric/Behavioral:  The patient is not nervous/anxious.         Objective:      Vitals:    05/31/22 1543   BP: 90/60   Pulse: 72   Weight: 68 kg (150 lb)   Height: 5' 2" (1.575 m)     Physical Exam  Constitutional:       Appearance: Normal appearance.      Comments: Ambulates with cane   HENT:      Head: Normocephalic and atraumatic.      Mouth/Throat:      Mouth: Mucous membranes are moist.   Eyes:      Conjunctiva/sclera: Conjunctivae normal.   Cardiovascular:    "   Rate and Rhythm: Normal rate.   Pulmonary:      Effort: Pulmonary effort is normal.   Musculoskeletal:         General: Deformity present.   Skin:         Neurological:      General: No focal deficit present.      Mental Status: She is alert and oriented to person, place, and time.   Psychiatric:         Mood and Affect: Mood normal.         Behavior: Behavior normal.         Assessment:       1. Psoriasiform eczema    2. Rheumatoid arthritis involving multiple sites with positive rheumatoid factor    3. Essential hypertension    4. Lumbar radiculopathy    5. Gout, unspecified cause, unspecified chronicity, unspecified site    6. Gastroesophageal reflux disease without esophagitis    7. Hypothyroidism (acquired)    8. Mixed hyperlipidemia    9. Iron deficiency anemia secondary to inadequate dietary iron intake    10. Chronic allergic rhinitis         Plan:       Psoriasiform eczema  -     triamcinolone acetonide 0.025% (KENALOG) 0.025 % cream; Apply a thin layer to affected areas 1-2 times a day when having a flare up  Dispense: 80 g; Refill: 4    Rheumatoid arthritis involving multiple sites with positive rheumatoid factor  -     methotrexate 2.5 MG Tab; Take 8 tablets (20 mg total) by mouth Every Friday.  Dispense: 32 tablet; Refill: 5    Essential hypertension    Lumbar radiculopathy  -     gabapentin (NEURONTIN) 300 MG capsule; Take 1 capsule (300 mg total) by mouth 2 (two) times daily. For nerve pain  Dispense: 180 capsule; Refill: 1    Gout, unspecified cause, unspecified chronicity, unspecified site  -     allopurinoL (ZYLOPRIM) 300 MG tablet; Take 1 tablet (300 mg total) by mouth 2 (two) times daily. For gout prevention  Dispense: 180 tablet; Refill: 1    Gastroesophageal reflux disease without esophagitis  -     omeprazole (PRILOSEC) 40 MG capsule; Take 1 capsule (40 mg total) by mouth once daily. For heartburn  Dispense: 90 capsule; Refill: 1    Hypothyroidism (acquired)  -     levothyroxine (SYNTHROID)  150 MCG tablet; Take 1 tablet (150 mcg total) by mouth before breakfast. For thryoid  Dispense: 90 tablet; Refill: 1    Mixed hyperlipidemia  -     pravastatin (PRAVACHOL) 40 MG tablet; Take 1 tablet (40 mg total) by mouth once daily.  Dispense: 90 tablet; Refill: 3    Iron deficiency anemia secondary to inadequate dietary iron intake    Chronic allergic rhinitis  -     desloratadine (CLARINEX) 5 mg tablet; Take 1 tablet (5 mg total) by mouth once daily. For allergies  Dispense: 90 tablet; Refill: 1    6 months  No follow-ups on file.

## 2022-08-24 DIAGNOSIS — I10 ESSENTIAL HYPERTENSION: ICD-10-CM

## 2022-08-24 RX ORDER — CARVEDILOL 3.12 MG/1
TABLET ORAL
Qty: 180 TABLET | Refills: 3 | Status: SHIPPED | OUTPATIENT
Start: 2022-08-24 | End: 2023-02-07 | Stop reason: SDUPTHER

## 2022-08-24 RX ORDER — TRAMADOL HYDROCHLORIDE 50 MG/1
50 TABLET ORAL EVERY 6 HOURS PRN
Qty: 90 TABLET | Refills: 2 | Status: SHIPPED | OUTPATIENT
Start: 2022-08-24 | End: 2023-03-27 | Stop reason: SDUPTHER

## 2022-08-24 NOTE — TELEPHONE ENCOUNTER
The patient's prescription has been approved and sent to   [id number] - [place], [place] - [address] [facility]  [address]  [place] [address]  Phone: [phone] Fax: [phone]

## 2022-08-24 NOTE — TELEPHONE ENCOUNTER
----- Message from Yamile Garcia sent at 8/24/2022 11:34 AM CDT -----  Pt daughter is calling for a refill on her tramadol and carvediol.  Dr. Corona is out of town and they told her to contact her pcp for refillis.   Elías pepper   [phone] pedro

## 2022-09-01 ENCOUNTER — TELEPHONE (OUTPATIENT)
Dept: FAMILY MEDICINE | Facility: CLINIC | Age: 72
End: 2022-09-01

## 2022-09-01 NOTE — TELEPHONE ENCOUNTER
----- Message from Ana Vu MA sent at 9/1/2022 12:46 PM CDT -----  Pt daughter pedro calling to make an appt for the pt because she is having an arthritis flare up. Pt can barely move her arms, she is stiff and the pain medication is making her nauseous. CB# [phone]

## 2022-09-01 NOTE — TELEPHONE ENCOUNTER
Spoke with patients daughter would like patient seen today.  Informed to go to urgent care for evaluation due to no available appointments today/tomorrow in office. Adriana verbalized understanding -DN

## 2022-10-04 ENCOUNTER — OFFICE VISIT (OUTPATIENT)
Dept: RHEUMATOLOGY | Facility: CLINIC | Age: 72
End: 2022-10-04
Payer: COMMERCIAL

## 2022-10-04 ENCOUNTER — TELEPHONE (OUTPATIENT)
Dept: RHEUMATOLOGY | Facility: CLINIC | Age: 72
End: 2022-10-04

## 2022-10-04 ENCOUNTER — HOSPITAL ENCOUNTER (OUTPATIENT)
Dept: RADIOLOGY | Facility: HOSPITAL | Age: 72
Discharge: HOME | End: 2022-10-04
Attending: INTERNAL MEDICINE
Payer: COMMERCIAL

## 2022-10-04 VITALS
DIASTOLIC BLOOD PRESSURE: 79 MMHG | SYSTOLIC BLOOD PRESSURE: 131 MMHG | BODY MASS INDEX: 28.84 KG/M2 | WEIGHT: 157.69 LBS

## 2022-10-04 DIAGNOSIS — M06.9 RHEUMATOID ARTHRITIS, INVOLVING UNSPECIFIED SITE, UNSPECIFIED WHETHER RHEUMATOID FACTOR PRESENT: ICD-10-CM

## 2022-10-04 DIAGNOSIS — M06.9 RHEUMATOID ARTHRITIS, INVOLVING UNSPECIFIED SITE, UNSPECIFIED WHETHER RHEUMATOID FACTOR PRESENT: Primary | ICD-10-CM

## 2022-10-04 PROCEDURE — 73630 X-RAY EXAM OF FOOT: CPT | Mod: TC,50

## 2022-10-04 PROCEDURE — 73130 X-RAY EXAM OF HAND: CPT | Mod: TC,50

## 2022-10-04 PROCEDURE — 99214 OFFICE O/P EST MOD 30 MIN: CPT | Mod: S$GLB,,, | Performed by: INTERNAL MEDICINE

## 2022-10-04 PROCEDURE — 99214 PR OFFICE/OUTPT VISIT, EST, LEVL IV, 30-39 MIN: ICD-10-PCS | Mod: S$GLB,,, | Performed by: INTERNAL MEDICINE

## 2022-10-04 RX ORDER — KETOCONAZOLE 20 MG/G
CREAM TOPICAL DAILY
COMMUNITY
Start: 2022-08-13

## 2022-10-04 NOTE — PROGRESS NOTES
[facility] RHEUMATOLOGY            PROGRESS NOTE      Subjective:       Patient ID:   NAME: Lizette Palafox : 1950     72 y.o. female    Referring Doc: No ref. provider found  Other Physicians:    Chief Complaint:  Rheumatoid Arthritis      History of Present Illness:     Patient returns today for a regularly scheduled follow-up visit for hx Rheumatoid Arthritis dxd in [place] about 15 yrs ago  On MTX since and prednisone since dx.Off prednisone for last few yrs  Last seen 2021.Has had follow ups with her PCP ( Rx from them as well)  The patient is doing well at present  No fatigue,prolonged AM stiffness or joint swelling.No severe arthralgias excep t R knee.  No sicca sxs, rashes or mucosal ulcerations  No Raynaud's      ROS:   GEN:  No  fever, night sweats . weight is stable   No fatigue  SKIN: no rashes, no bruising, no ulcerations, no Raynaud's  HEENT: no HA's, No visual changes, no mucosal ulcers, no sicca symptoms,  CV:   no CP, SOB, PND, ATKINSON, no orthopnea, no palpitations  PULM: normal with no SOB, cough, hemoptysis, sputum or pleuritic pain  GI:  no abdominal pain, nausea, vomiting, constipation, diarrhea, melanotic stools, BRBPR, hematemesis, no dysphagia  NEURO: no paresthesias, headaches, visual disturbances, muscle weakness  MUSCULOSKELETAL:no joint swelling, prolonged AM stiffness, no back pain, no muscle pain  Allergies:  Review of patient's allergies indicates:   Allergen Reactions    Levaquin [levofloxacin] Hives and Edema    Tramadol Nausea And Vomiting       Medications:    Current Outpatient Medications:     allopurinoL (ZYLOPRIM) 300 MG tablet, Take 1 tablet (300 mg total) by mouth 2 (two) times daily. For gout prevention, Disp: 180 tablet, Rfl: 1    aspirin 81 MG Chew, Take 81 mg by mouth once daily. , Disp: , Rfl:     carvediloL (COREG) 3.125 MG tablet, TAKE 1 TABLET(3.125 MG) BY MOUTH TWICE DAILY WITH MEALS, Disp: 180 tablet, Rfl: 3    desloratadine (CLARINEX) 5 mg tablet,  Take 1 tablet (5 mg total) by mouth once daily. For allergies, Disp: 90 tablet, Rfl: 1    ferrous sulfate (IRON ORAL), Take 1 tablet by mouth once daily., Disp: , Rfl:     gabapentin (NEURONTIN) 300 MG capsule, Take 1 capsule (300 mg total) by mouth 2 (two) times daily. For nerve pain, Disp: 180 capsule, Rfl: 1    ketoconazole (NIZORAL) 2 % cream, Apply topically once daily., Disp: , Rfl:     levothyroxine (SYNTHROID) 150 MCG tablet, Take 1 tablet (150 mcg total) by mouth before breakfast. For thryoid, Disp: 90 tablet, Rfl: 1    methotrexate 2.5 MG Tab, Take 8 tablets (20 mg total) by mouth Every Friday., Disp: 32 tablet, Rfl: 5    omeprazole (PRILOSEC) 40 MG capsule, Take 1 capsule (40 mg total) by mouth once daily. For heartburn, Disp: 90 capsule, Rfl: 1    pravastatin (PRAVACHOL) 40 MG tablet, Take 1 tablet (40 mg total) by mouth once daily., Disp: 90 tablet, Rfl: 3    traMADoL (ULTRAM) 50 mg tablet, Take 1 tablet (50 mg total) by mouth every 6 (six) hours as needed for Pain., Disp: 90 tablet, Rfl: 2    triamcinolone acetonide 0.025% (KENALOG) 0.025 % cream, Apply a thin layer to affected areas 1-2 times a day when having a flare up, Disp: 80 g, Rfl: 4    valsartan (DIOVAN) 160 MG tablet, Take 1 tablet (160 mg total) by mouth once daily., Disp: 90 tablet, Rfl: 3    vitamin D (VITAMIN D3) 1000 units Tab, Take 2,000 Units by mouth once daily. , Disp: , Rfl:     PMHx/PSHx Updates:      Objective:     Vitals:  Blood pressure 131/79, weight 71.5 kg (157 lb 11.2 oz).    Physical Examination:   GEN: no apparent distress, comfortable; AAOx3  SKIN: no rashes,no ulceration, no Raynaud's, no petechiae, no SQ nodules,  HEAD: normal  EYES: no pallor, no icterus,  NECK: no masses, thyroid normal, trachea midline, no LAD/LN's, supple  CV: RRR with no murmur; l S1 and S2 reg. ,no gallop no rubs,   CHEST: Normal respiratory effort; CTAB; normal breath sounds; no wheeze or crackles  MUSC/Skeletal: ROM normal; no crepitus; joints  without synovitis No joint swelling or tenderness of PIP, MCP, wrist, elbow, shoulder, or knee joints  EXTREM: no clubbing, cyanosis, no edema  NEURO: grossly intact; motor WNL; AAOx3; ,   PSYCH: normal mood, affect and behavior  LYMPH: normal cervical, supraclavicular          Labs:   Lab Results   Component Value Date    WBC 11.0 (H) 11/18/2021    HGB 11.3 (L) 11/18/2021    HCT 34.5 (L) 11/18/2021    MCV 98.0 11/18/2021     (H) 11/18/2021    CMP  @LASTLAB(NA,K,CL,CO2,GLU,BUN,Creatinine,Calcium,PROT,Albumin,Bilitot,Alkphos,AST,ALT,CRP,ESR,RF,CCP,MALINI,SSA,CPK,uric acid) )@  I have reviewed all available lab results and radiology reports.    Radiology/Diagnostic Studies:  Last labs 11/2021      Assessment/Plan:   (1) 72 y.o. female with diagnosis of hx seroneg RA: stable  Should have labs q 3 moths to monitor for side effects MTX.  She will FU with PCP and make appt with Kb                Discussion:     I have explained all of the above in detail and the patient understands all of the current recommendation(s). I have answered all questions to the best of my ability and to their complete satisfaction.       The patient is to continue with the current management plan             Electronically signed by Surendra Corona MD    Patient notified that this office will be closing December 22, 2022. They should begin looking for another rheumatologist as soon as possible.  A list with the names and contact details of rheumatologists in the surrounding area was provided.

## 2022-11-30 ENCOUNTER — OFFICE VISIT (OUTPATIENT)
Dept: FAMILY MEDICINE | Facility: CLINIC | Age: 72
End: 2022-11-30
Payer: COMMERCIAL

## 2022-11-30 VITALS
OXYGEN SATURATION: 100 % | SYSTOLIC BLOOD PRESSURE: 128 MMHG | HEART RATE: 68 BPM | DIASTOLIC BLOOD PRESSURE: 68 MMHG | BODY MASS INDEX: 28.89 KG/M2 | WEIGHT: 157 LBS | HEIGHT: 62 IN

## 2022-11-30 DIAGNOSIS — M05.79 RHEUMATOID ARTHRITIS INVOLVING MULTIPLE SITES WITH POSITIVE RHEUMATOID FACTOR: ICD-10-CM

## 2022-11-30 DIAGNOSIS — E03.9 HYPOTHYROIDISM (ACQUIRED): ICD-10-CM

## 2022-11-30 DIAGNOSIS — M54.16 LUMBAR RADICULOPATHY: ICD-10-CM

## 2022-11-30 DIAGNOSIS — M10.9 GOUT, UNSPECIFIED CAUSE, UNSPECIFIED CHRONICITY, UNSPECIFIED SITE: ICD-10-CM

## 2022-11-30 DIAGNOSIS — K21.9 GASTROESOPHAGEAL REFLUX DISEASE WITHOUT ESOPHAGITIS: ICD-10-CM

## 2022-11-30 DIAGNOSIS — I10 ESSENTIAL HYPERTENSION: Primary | ICD-10-CM

## 2022-11-30 DIAGNOSIS — Z23 NEED FOR INFLUENZA VACCINATION: ICD-10-CM

## 2022-11-30 PROBLEM — J02.9 PHARYNGITIS: Status: RESOLVED | Noted: 2021-03-23 | Resolved: 2022-11-30

## 2022-11-30 PROCEDURE — 3078F PR MOST RECENT DIASTOLIC BLOOD PRESSURE < 80 MM HG: ICD-10-PCS | Mod: CPTII,S$GLB,, | Performed by: FAMILY MEDICINE

## 2022-11-30 PROCEDURE — 3078F DIAST BP <80 MM HG: CPT | Mod: CPTII,S$GLB,, | Performed by: FAMILY MEDICINE

## 2022-11-30 PROCEDURE — 90662 IIV NO PRSV INCREASED AG IM: CPT | Mod: S$GLB,,, | Performed by: FAMILY MEDICINE

## 2022-11-30 PROCEDURE — 3008F PR BODY MASS INDEX (BMI) DOCUMENTED: ICD-10-PCS | Mod: CPTII,S$GLB,, | Performed by: FAMILY MEDICINE

## 2022-11-30 PROCEDURE — 4010F ACE/ARB THERAPY RXD/TAKEN: CPT | Mod: CPTII,S$GLB,, | Performed by: FAMILY MEDICINE

## 2022-11-30 PROCEDURE — G0008 ADMIN INFLUENZA VIRUS VAC: HCPCS | Mod: S$GLB,,, | Performed by: FAMILY MEDICINE

## 2022-11-30 PROCEDURE — G0008 FLU VACCINE - QUADRIVALENT - HIGH DOSE (65+) PRESERVATIVE FREE IM: ICD-10-PCS | Mod: S$GLB,,, | Performed by: FAMILY MEDICINE

## 2022-11-30 PROCEDURE — 3074F SYST BP LT 130 MM HG: CPT | Mod: CPTII,S$GLB,, | Performed by: FAMILY MEDICINE

## 2022-11-30 PROCEDURE — 99214 OFFICE O/P EST MOD 30 MIN: CPT | Mod: 25,S$GLB,, | Performed by: FAMILY MEDICINE

## 2022-11-30 PROCEDURE — 3074F PR MOST RECENT SYSTOLIC BLOOD PRESSURE < 130 MM HG: ICD-10-PCS | Mod: CPTII,S$GLB,, | Performed by: FAMILY MEDICINE

## 2022-11-30 PROCEDURE — 1159F PR MEDICATION LIST DOCUMENTED IN MEDICAL RECORD: ICD-10-PCS | Mod: CPTII,S$GLB,, | Performed by: FAMILY MEDICINE

## 2022-11-30 PROCEDURE — 90662 FLU VACCINE - QUADRIVALENT - HIGH DOSE (65+) PRESERVATIVE FREE IM: ICD-10-PCS | Mod: S$GLB,,, | Performed by: FAMILY MEDICINE

## 2022-11-30 PROCEDURE — 1159F MED LIST DOCD IN RCRD: CPT | Mod: CPTII,S$GLB,, | Performed by: FAMILY MEDICINE

## 2022-11-30 PROCEDURE — 3008F BODY MASS INDEX DOCD: CPT | Mod: CPTII,S$GLB,, | Performed by: FAMILY MEDICINE

## 2022-11-30 PROCEDURE — 99214 PR OFFICE/OUTPT VISIT, EST, LEVL IV, 30-39 MIN: ICD-10-PCS | Mod: 25,S$GLB,, | Performed by: FAMILY MEDICINE

## 2022-11-30 PROCEDURE — 4010F PR ACE/ARB THEARPY RXD/TAKEN: ICD-10-PCS | Mod: CPTII,S$GLB,, | Performed by: FAMILY MEDICINE

## 2022-11-30 RX ORDER — METHOTREXATE 2.5 MG/1
20 TABLET ORAL
Qty: 32 TABLET | Refills: 5 | Status: SHIPPED | OUTPATIENT
Start: 2022-12-02 | End: 2023-05-30 | Stop reason: SDUPTHER

## 2022-11-30 RX ORDER — LEVOTHYROXINE SODIUM 150 UG/1
150 TABLET ORAL
Qty: 90 TABLET | Refills: 1 | Status: SHIPPED | OUTPATIENT
Start: 2022-11-30 | End: 2023-05-30 | Stop reason: SDUPTHER

## 2022-11-30 RX ORDER — VALSARTAN 80 MG/1
80 TABLET ORAL DAILY
Qty: 90 TABLET | Refills: 3 | Status: SHIPPED | OUTPATIENT
Start: 2022-11-30 | End: 2023-11-30

## 2022-11-30 RX ORDER — ALLOPURINOL 300 MG/1
300 TABLET ORAL 2 TIMES DAILY
Qty: 180 TABLET | Refills: 1 | Status: SHIPPED | OUTPATIENT
Start: 2022-11-30 | End: 2023-05-30 | Stop reason: SDUPTHER

## 2022-11-30 RX ORDER — OMEPRAZOLE 40 MG/1
40 CAPSULE, DELAYED RELEASE ORAL DAILY
Qty: 90 CAPSULE | Refills: 1 | Status: SHIPPED | OUTPATIENT
Start: 2022-11-30 | End: 2023-05-30 | Stop reason: SDUPTHER

## 2022-11-30 RX ORDER — GABAPENTIN 300 MG/1
300 CAPSULE ORAL 2 TIMES DAILY
Qty: 180 CAPSULE | Refills: 1 | Status: SHIPPED | OUTPATIENT
Start: 2022-11-30 | End: 2023-05-30 | Stop reason: SDUPTHER

## 2022-11-30 NOTE — PROGRESS NOTES
SUBJECTIVE:    Patient ID: Lizette Palafox is a 72 y.o. female.    Chief Complaint: Hypertension (Went over meds verbally, all HM declined, Flu wants// SW)    Patient with rheumatoid arthritis, hypothyroidism and steroid induced diabetes presents for visit.  She reports she has been doing well.  Pain is controlled on her current medications.  Recent labs demonstrate mild anemia but fine otherwise.  Last TSH performed a year ago and needs to be recheck.  History of elevated uric acid but no gout flares.  Up-to-date with mammogram.  Flu vaccine will be given today.  She reports no falls.  Vital signs are stable.  She reports her appetite is good.  Dictation #1  MRN:[id number]  CSN:[id number]       Lab Visit on 10/04/2022   Component Date Value Ref Range Status    WBC 10/04/2022 9.59  3.90 - 12.70 K/uL Final    RBC 10/04/2022 3.35 (L)  4.00 - 5.40 M/uL Final    Hemoglobin 10/04/2022 11.0 (L)  12.0 - 16.0 g/dL Final    Hematocrit 10/04/2022 34.5 (L)  37.0 - 48.5 % Final    MCV 10/04/2022 103 (H)  82 - 98 fL Final    MCH 10/04/2022 32.8 (H)  27.0 - 31.0 pg Final    MCHC 10/04/2022 31.9 (L)  32.0 - 36.0 g/dL Final    RDW 10/04/2022 14.0  11.5 - 14.5 % Final    Platelets 10/04/2022 338  150 - 450 K/uL Final    MPV 10/04/2022 9.0 (L)  9.2 - 12.9 fL Final    Immature Granulocytes 10/04/2022 0.5  0.0 - 0.5 % Final    Gran # (ANC) 10/04/2022 6.4  1.8 - 7.7 K/uL Final    Immature Grans (Abs) 10/04/2022 0.05 (H)  0.00 - 0.04 K/uL Final    Lymph # 10/04/2022 1.8  1.0 - 4.8 K/uL Final    Mono # 10/04/2022 0.6  0.3 - 1.0 K/uL Final    Eos # 10/04/2022 0.7 (H)  0.0 - 0.5 K/uL Final    Baso # 10/04/2022 0.07  0.00 - 0.20 K/uL Final    nRBC 10/04/2022 0  0 /100 WBC Final    Gran % 10/04/2022 66.6  38.0 - 73.0 % Final    Lymph % 10/04/2022 18.4  18.0 - 48.0 % Final    Mono % 10/04/2022 6.7  4.0 - 15.0 % Final    Eosinophil % 10/04/2022 7.1  0.0 - 8.0 % Final    Basophil % 10/04/2022 0.7  0.0 - 1.9 % Final    Differential Method 10/04/2022  Automated   Final    Sodium 10/04/2022 139  136 - 145 mmol/L Final    Potassium 10/04/2022 4.6  3.5 - 5.1 mmol/L Final    Chloride 10/04/2022 109  95 - 110 mmol/L Final    CO2 10/04/2022 25  23 - 29 mmol/L Final    Glucose 10/04/2022 98  70 - 110 mg/dL Final    BUN 10/04/2022 17  8 - 23 mg/dL Final    Creatinine 10/04/2022 0.9  0.5 - 1.4 mg/dL Final    Calcium 10/04/2022 9.7  8.7 - 10.5 mg/dL Final    Total Protein 10/04/2022 8.0  6.0 - 8.4 g/dL Final    Albumin 10/04/2022 3.8  3.5 - 5.2 g/dL Final    Total Bilirubin 10/04/2022 0.7  0.1 - 1.0 mg/dL Final    Alkaline Phosphatase 10/04/2022 79  55 - 135 U/L Final    AST 10/04/2022 19  10 - 40 U/L Final    ALT 10/04/2022 9 (L)  10 - 44 U/L Final    Anion Gap 10/04/2022 5 (L)  8 - 16 mmol/L Final    eGFR 10/04/2022 >60.0  >60 mL/min/1.73 m^2 Final    CRP 10/04/2022 0.63  <0.76 mg/dL Final    MALINI 10/04/2022 Positive (A)   Final    Anti-SSA Antibody 10/04/2022 <0.2  0.0 - 0.9 AI Final    Speckled Pattern 10/04/2022 1:320 (H)   Final    MALINI Note 10/04/2022 Comment   Final        Past Medical History:   Diagnosis Date    Anemia, unspecified 2021    Arthritis     Rheumatoid    Encounter for blood transfusion     GERD (gastroesophageal reflux disease)     Gout     Hypertension     Macrocytosis 2021    Thyroid disease     Transfusion reaction      Past Surgical History:   Procedure Laterality Date    APPENDECTOMY       SECTION      x3    DILATION AND CURETTAGE OF UTERUS      GALLBLADDER SURGERY      HYSTERECTOMY      TOTAL HIP ARTHROPLASTY       Family History   Problem Relation Age of Onset    No Known Problems Mother     No Known Problems Father        Marital Status: Legally   Alcohol History:  reports that she does not currently use alcohol.  Tobacco History:  reports that she has never smoked. She has never used smokeless tobacco.  Drug History:  reports no history of drug use.    Review of patient's allergies indicates:   Allergen Reactions     Levaquin [levofloxacin] Hives and Edema    Tramadol Nausea And Vomiting       Current Outpatient Medications:     aspirin 81 MG Chew, Take 81 mg by mouth once daily. , Disp: , Rfl:     carvediloL (COREG) 3.125 MG tablet, TAKE 1 TABLET(3.125 MG) BY MOUTH TWICE DAILY WITH MEALS, Disp: 180 tablet, Rfl: 3    desloratadine (CLARINEX) 5 mg tablet, Take 1 tablet (5 mg total) by mouth once daily. For allergies, Disp: 90 tablet, Rfl: 1    ferrous sulfate (IRON ORAL), Take 1 tablet by mouth once daily., Disp: , Rfl:     ketoconazole (NIZORAL) 2 % cream, Apply topically once daily., Disp: , Rfl:     pravastatin (PRAVACHOL) 40 MG tablet, Take 1 tablet (40 mg total) by mouth once daily., Disp: 90 tablet, Rfl: 3    traMADoL (ULTRAM) 50 mg tablet, Take 1 tablet (50 mg total) by mouth every 6 (six) hours as needed for Pain., Disp: 90 tablet, Rfl: 2    triamcinolone acetonide 0.025% (KENALOG) 0.025 % cream, Apply a thin layer to affected areas 1-2 times a day when having a flare up, Disp: 80 g, Rfl: 4    vitamin D (VITAMIN D3) 1000 units Tab, Take 2,000 Units by mouth once daily. , Disp: , Rfl:     allopurinoL (ZYLOPRIM) 300 MG tablet, Take 1 tablet (300 mg total) by mouth 2 (two) times daily. For gout prevention, Disp: 180 tablet, Rfl: 1    gabapentin (NEURONTIN) 300 MG capsule, Take 1 capsule (300 mg total) by mouth 2 (two) times daily. For nerve pain, Disp: 180 capsule, Rfl: 1    levothyroxine (SYNTHROID) 150 MCG tablet, Take 1 tablet (150 mcg total) by mouth before breakfast. For thryoid, Disp: 90 tablet, Rfl: 1    [START ON 12/2/2022] methotrexate 2.5 MG Tab, Take 8 tablets (20 mg total) by mouth Every Friday., Disp: 32 tablet, Rfl: 5    omeprazole (PRILOSEC) 40 MG capsule, Take 1 capsule (40 mg total) by mouth once daily. For heartburn, Disp: 90 capsule, Rfl: 1    valsartan (DIOVAN) 80 MG tablet, Take 1 tablet (80 mg total) by mouth once daily., Disp: 90 tablet, Rfl: 3    Review of Systems   Constitutional:  Negative for  "activity change, fatigue and unexpected weight change.   HENT:  Negative for hearing loss, postnasal drip, sinus pressure, sore throat and voice change.    Eyes:  Negative for photophobia and visual disturbance.   Respiratory:  Negative for cough, shortness of breath and wheezing.    Cardiovascular:  Negative for chest pain and palpitations.   Gastrointestinal:  Negative for constipation, diarrhea and nausea.   Genitourinary:  Negative for difficulty urinating, frequency, hematuria and urgency.   Musculoskeletal:  Negative for arthralgias and back pain.   Skin:  Negative for rash.   Neurological:  Negative for weakness, light-headedness and headaches.   Hematological:  Negative for adenopathy. Does not bruise/bleed easily.   Psychiatric/Behavioral:  The patient is not nervous/anxious.         Objective:      Vitals:    11/30/22 1441   BP: 128/68   Pulse: 68   SpO2: 100%   Weight: 71.2 kg (157 lb)   Height: 5' 2" (1.575 m)     Physical Exam  Constitutional:       Appearance: Normal appearance.      Comments: Ambulates with walker   HENT:      Head: Normocephalic and atraumatic.      Mouth/Throat:      Mouth: Mucous membranes are moist.   Eyes:      Conjunctiva/sclera: Conjunctivae normal.   Cardiovascular:      Rate and Rhythm: Normal rate.   Pulmonary:      Effort: Pulmonary effort is normal.   Musculoskeletal:         General: Deformity present.      Right lower leg: No edema.      Left lower leg: No edema.   Skin:     General: Skin is warm and dry.   Neurological:      General: No focal deficit present.      Mental Status: She is alert and oriented to person, place, and time.   Psychiatric:         Mood and Affect: Mood normal.         Behavior: Behavior normal.         Assessment:       1. Essential hypertension    2. Hypothyroidism (acquired)    3. Rheumatoid arthritis involving multiple sites with positive rheumatoid factor    4. Gastroesophageal reflux disease without esophagitis    5. Lumbar radiculopathy  "   6. Gout, unspecified cause, unspecified chronicity, unspecified site    7. Need for influenza vaccination           Plan:       Essential hypertension  -     valsartan (DIOVAN) 80 MG tablet; Take 1 tablet (80 mg total) by mouth once daily.  Dispense: 90 tablet; Refill: 3    Hypothyroidism (acquired)  -     TSH w/reflex to FT4; Future; Expected date: 11/30/2022  -     levothyroxine (SYNTHROID) 150 MCG tablet; Take 1 tablet (150 mcg total) by mouth before breakfast. For thryoid  Dispense: 90 tablet; Refill: 1    Rheumatoid arthritis involving multiple sites with positive rheumatoid factor  -     methotrexate 2.5 MG Tab; Take 8 tablets (20 mg total) by mouth Every Friday.  Dispense: 32 tablet; Refill: 5    Gastroesophageal reflux disease without esophagitis  -     omeprazole (PRILOSEC) 40 MG capsule; Take 1 capsule (40 mg total) by mouth once daily. For heartburn  Dispense: 90 capsule; Refill: 1    Lumbar radiculopathy  -     gabapentin (NEURONTIN) 300 MG capsule; Take 1 capsule (300 mg total) by mouth 2 (two) times daily. For nerve pain  Dispense: 180 capsule; Refill: 1    Gout, unspecified cause, unspecified chronicity, unspecified site  -     allopurinoL (ZYLOPRIM) 300 MG tablet; Take 1 tablet (300 mg total) by mouth 2 (two) times daily. For gout prevention  Dispense: 180 tablet; Refill: 1    Need for influenza vaccination  -     Influenza - Quadrivalent - High Dose (65+) (PF) (IM)    Follow up in about 6 months (around 5/30/2023) for thyroid .

## 2022-12-01 LAB
T4 FREE SERPL-MCNC: 1.2 NG/DL (ref 0.8–1.8)
TSH SERPL-ACNC: 8.86 MIU/L (ref 0.4–4.5)

## 2023-02-07 DIAGNOSIS — I10 ESSENTIAL HYPERTENSION: ICD-10-CM

## 2023-02-07 RX ORDER — CARVEDILOL 3.12 MG/1
TABLET ORAL
Qty: 180 TABLET | Refills: 3 | Status: SHIPPED | OUTPATIENT
Start: 2023-02-07

## 2023-02-07 NOTE — TELEPHONE ENCOUNTER
prescription sent to   [id number] - [place] - [address] [facility]  [address]  [place] [address]  Phone: [phone] Fax: [phone]

## 2023-02-15 ENCOUNTER — TELEPHONE (OUTPATIENT)
Dept: FAMILY MEDICINE | Facility: CLINIC | Age: 73
End: 2023-02-15

## 2023-02-15 NOTE — TELEPHONE ENCOUNTER
Spoke with patient states trouble urinating on and off x2 weeks.  States she has the urge to go but is unable to go, little drops only.  Symptoms worse today and yesterday.  Informed patient to report to ER.  Patient verbalized understanding -DN

## 2023-02-15 NOTE — TELEPHONE ENCOUNTER
----- Message from Amber Carrillo sent at 2/15/2023  2:11 PM CST -----  Pt would like to get an appt. States she can not urinate. [phone]

## 2023-02-16 ENCOUNTER — HOSPITAL ENCOUNTER (EMERGENCY)
Facility: HOSPITAL | Age: 73
Discharge: HOME | End: 2023-02-16
Attending: EMERGENCY MEDICINE
Payer: COMMERCIAL

## 2023-02-16 VITALS
DIASTOLIC BLOOD PRESSURE: 66 MMHG | TEMPERATURE: 98 F | RESPIRATION RATE: 20 BRPM | OXYGEN SATURATION: 96 % | SYSTOLIC BLOOD PRESSURE: 145 MMHG | HEART RATE: 60 BPM | BODY MASS INDEX: 27.97 KG/M2 | HEIGHT: 62 IN | WEIGHT: 152 LBS

## 2023-02-16 DIAGNOSIS — Q64.9 URINARY ANOMALY: Primary | ICD-10-CM

## 2023-02-16 LAB
ALBUMIN SERPL BCP-MCNC: 3.6 G/DL (ref 3.5–5.2)
ALP SERPL-CCNC: 74 U/L (ref 55–135)
ALT SERPL W/O P-5'-P-CCNC: 11 U/L (ref 10–44)
ANION GAP SERPL CALC-SCNC: 8 MMOL/L (ref 8–16)
AST SERPL-CCNC: 19 U/L (ref 10–40)
BASOPHILS # BLD AUTO: 0.09 K/UL (ref 0–0.2)
BASOPHILS NFR BLD: 1.1 % (ref 0–1.9)
BILIRUB SERPL-MCNC: 0.5 MG/DL (ref 0.1–1)
BILIRUB UR QL STRIP: NEGATIVE
BUN SERPL-MCNC: 14 MG/DL (ref 8–23)
CALCIUM SERPL-MCNC: 9.1 MG/DL (ref 8.7–10.5)
CHLORIDE SERPL-SCNC: 107 MMOL/L (ref 95–110)
CLARITY UR: CLEAR
CO2 SERPL-SCNC: 23 MMOL/L (ref 23–29)
COLOR UR: YELLOW
CREAT SERPL-MCNC: 0.9 MG/DL (ref 0.5–1.4)
DIFFERENTIAL METHOD: ABNORMAL
EOSINOPHIL # BLD AUTO: 0.5 K/UL (ref 0–0.5)
EOSINOPHIL NFR BLD: 5.5 % (ref 0–8)
ERYTHROCYTE [DISTWIDTH] IN BLOOD BY AUTOMATED COUNT: 14.6 % (ref 11.5–14.5)
EST. GFR  (NO RACE VARIABLE): >60 ML/MIN/1.73 M^2
GLUCOSE SERPL-MCNC: 97 MG/DL (ref 70–110)
GLUCOSE UR QL STRIP: NEGATIVE
HCT VFR BLD AUTO: 34 % (ref 37–48.5)
HGB BLD-MCNC: 10.8 G/DL (ref 12–16)
HGB UR QL STRIP: NEGATIVE
IMM GRANULOCYTES # BLD AUTO: 0.08 K/UL (ref 0–0.04)
IMM GRANULOCYTES NFR BLD AUTO: 1 % (ref 0–0.5)
KETONES UR QL STRIP: NEGATIVE
LEUKOCYTE ESTERASE UR QL STRIP: NEGATIVE
LYMPHOCYTES # BLD AUTO: 2.3 K/UL (ref 1–4.8)
LYMPHOCYTES NFR BLD: 27.7 % (ref 18–48)
MCH RBC QN AUTO: 32.2 PG (ref 27–31)
MCHC RBC AUTO-ENTMCNC: 31.8 G/DL (ref 32–36)
MCV RBC AUTO: 102 FL (ref 82–98)
MONOCYTES # BLD AUTO: 0.6 K/UL (ref 0.3–1)
MONOCYTES NFR BLD: 7.8 % (ref 4–15)
NEUTROPHILS # BLD AUTO: 4.6 K/UL (ref 1.8–7.7)
NEUTROPHILS NFR BLD: 56.9 % (ref 38–73)
NITRITE UR QL STRIP: NEGATIVE
NRBC BLD-RTO: 0 /100 WBC
PH UR STRIP: 6 [PH] (ref 5–8)
PLATELET # BLD AUTO: 336 K/UL (ref 150–450)
PMV BLD AUTO: 8.9 FL (ref 9.2–12.9)
POTASSIUM SERPL-SCNC: 4.1 MMOL/L (ref 3.5–5.1)
PROT SERPL-MCNC: 7.4 G/DL (ref 6–8.4)
PROT UR QL STRIP: NEGATIVE
RBC # BLD AUTO: 3.35 M/UL (ref 4–5.4)
SODIUM SERPL-SCNC: 138 MMOL/L (ref 136–145)
SP GR UR STRIP: 1.01 (ref 1–1.03)
URN SPEC COLLECT METH UR: NORMAL
UROBILINOGEN UR STRIP-ACNC: NEGATIVE EU/DL
WBC # BLD AUTO: 8.16 K/UL (ref 3.9–12.7)

## 2023-02-16 PROCEDURE — 99283 EMERGENCY DEPT VISIT LOW MDM: CPT

## 2023-02-16 PROCEDURE — 81003 URINALYSIS AUTO W/O SCOPE: CPT | Performed by: EMERGENCY MEDICINE

## 2023-02-16 PROCEDURE — 85025 COMPLETE CBC W/AUTO DIFF WBC: CPT | Performed by: EMERGENCY MEDICINE

## 2023-02-16 PROCEDURE — 80053 COMPREHEN METABOLIC PANEL: CPT | Performed by: EMERGENCY MEDICINE

## 2023-02-16 NOTE — ED PROVIDER NOTES
Encounter Date: 2023       History     Chief Complaint   Patient presents with    Urinary Retention     Difficulty emptying bladder x4 days     Patient reports approximally 2-3 day history of difficulty urinating.  Patient feels like she has to go to bathroom.  When she uses the bathroom she only makes a small amount of urine.  No fever chills.  No suprapubic pain.  Patient had 1 similar episode in the past.  She was placed on medications symptoms improved.  No relieving factors at home.  Symptoms continue in the emergency room.    Review of patient's allergies indicates:   Allergen Reactions    Levaquin [levofloxacin] Hives and Edema    Tramadol Nausea And Vomiting     Past Medical History:   Diagnosis Date    Anemia, unspecified 2021    Arthritis     Rheumatoid    Encounter for blood transfusion     GERD (gastroesophageal reflux disease)     Gout     Hypertension     Macrocytosis 2021    Thyroid disease     Transfusion reaction      Past Surgical History:   Procedure Laterality Date    APPENDECTOMY       SECTION      x3    DILATION AND CURETTAGE OF UTERUS      GALLBLADDER SURGERY      HYSTERECTOMY      TOTAL HIP ARTHROPLASTY       Family History   Problem Relation Age of Onset    No Known Problems Mother     No Known Problems Father      Social History     Tobacco Use    Smoking status: Never    Smokeless tobacco: Never   Substance Use Topics    Alcohol use: Not Currently    Drug use: Never     Review of Systems   Constitutional:  Negative for chills and fever.   HENT:  Negative for congestion.    Eyes:  Negative for visual disturbance.   Respiratory:  Negative for shortness of breath.    Cardiovascular:  Negative for chest pain and palpitations.   Gastrointestinal:  Negative for abdominal pain and vomiting.   Genitourinary:  Positive for frequency. Negative for flank pain and hematuria.   Musculoskeletal:  Negative for joint swelling.   Neurological:  Negative for headaches.    Psychiatric/Behavioral:  Negative for confusion.      Physical Exam     Initial Vitals [02/16/23 1648]   BP Pulse Resp Temp SpO2   137/78 65 17 98.3 °F (36.8 °C) 99 %      MAP       --         Physical Exam    Nursing note and vitals reviewed.  Constitutional: She is not diaphoretic. No distress.   HENT:   Head: Normocephalic and atraumatic.   Eyes: Conjunctivae are normal.   Neck:   Normal range of motion.  Cardiovascular:  Normal rate.           Pulmonary/Chest: Breath sounds normal.   Abdominal: Abdomen is soft. There is no abdominal tenderness.   No reproducible abdominal tenderness.  No CVA tenderness.   Musculoskeletal:         General: Normal range of motion.      Cervical back: Normal range of motion.     Neurological: She is alert.   No gross deficits   Skin: No rash noted.   Psychiatric: She has a normal mood and affect.       ED Course   Procedures  Labs Reviewed   CBC W/ AUTO DIFFERENTIAL - Abnormal; Notable for the following components:       Result Value    RBC 3.35 (*)     Hemoglobin 10.8 (*)     Hematocrit 34.0 (*)      (*)     MCH 32.2 (*)     MCHC 31.8 (*)     RDW 14.6 (*)     MPV 8.9 (*)     Immature Granulocytes 1.0 (*)     Immature Grans (Abs) 0.08 (*)     All other components within normal limits   URINALYSIS, REFLEX TO URINE CULTURE    Narrative:     Specimen Source->Urine   COMPREHENSIVE METABOLIC PANEL          Imaging Results    None          Medications - No data to display  Medical Decision Making:   History:   Old Medical Records: I decided to obtain old medical records.  Clinical Tests:   Lab Tests: Reviewed  ED Management:  Patient presents with dribbling urine output.  Differential diagnosis includes primary bladder pathology, urinary tract infection, kidney injury.  Here patient is sitting up in bed.  She is very pleasant and absolutely no distress.  She has normal physical exam with absolutely no tenderness.  CBC CMP and urinalysis are completely unremarkable.  No evidence  of infection.  Will refer to urology for further evaluation.  Bladder scan obtained.  Patient with approximally 130 ml of urine.  No significant retention.                        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)        ED Disposition Condition    Discharge Stable          ED Prescriptions    None       Follow-up Information       Follow up With Specialties Details Why Contact Info Additional Information    [facility] - Emergency Dept Emergency Medicine  [facility] symptoms worsen [address] [facility] [id number] [phone] 1st floor    Yamile Castillo MD Urology Schedule an appointment as soon as possible for a visit   [address] [place] [address] [address]  [address]  [facility] [address]  [phone]                Carlos England MD  02/16/23 2772

## 2023-03-09 ENCOUNTER — TELEPHONE (OUTPATIENT)
Dept: FAMILY MEDICINE | Facility: CLINIC | Age: 73
End: 2023-03-09

## 2023-03-09 NOTE — TELEPHONE ENCOUNTER
Spoke with Dayana at [facility] [employer] she stated Dr. Mccord is able to call the Provider Portal [phone]  to get patients health assessment.     I requested the assessment to be faxed to us, Dayana said this is not possible and to call above number for assessment.

## 2023-03-09 NOTE — TELEPHONE ENCOUNTER
----- Message from Amber Carrillo sent at 3/9/2023 12:09 PM CST -----  [facility] called and stated they have complete the pt health assessment and if you would like to view it you can do so in the provider portal online. If you would like to participate you can call [phone] Option 3

## 2023-03-14 ENCOUNTER — TELEPHONE (OUTPATIENT)
Dept: FAMILY MEDICINE | Facility: CLINIC | Age: 73
End: 2023-03-14

## 2023-03-14 NOTE — TELEPHONE ENCOUNTER
----- Message from Ana Vu MA sent at 3/14/2023 12:20 PM CDT -----  Regarding: med request  Pt daughter calling because the pt has a sore throat cough, and has lost her voice and states she would like something called in without the pt coming in because it is hard for her to walk. [phone]

## 2023-03-14 NOTE — TELEPHONE ENCOUNTER
Spoke with patients daughter, states patient unable to speak at this time.  C/o cough, sore throat, and lost voice x3 days.  Is not taking anything otc.  Please advise -DN    PRINTED

## 2023-03-15 ENCOUNTER — TELEPHONE (OUTPATIENT)
Dept: FAMILY MEDICINE | Facility: CLINIC | Age: 73
End: 2023-03-15

## 2023-03-15 RX ORDER — PROMETHAZINE HYDROCHLORIDE AND DEXTROMETHORPHAN HYDROBROMIDE 6.25; 15 MG/5ML; MG/5ML
5 SYRUP ORAL EVERY 4 HOURS PRN
Qty: 180 ML | Refills: 0 | Status: SHIPPED | OUTPATIENT
Start: 2023-03-15 | End: 2023-03-25

## 2023-03-15 RX ORDER — AZITHROMYCIN 250 MG/1
TABLET, FILM COATED ORAL
Qty: 6 TABLET | Refills: 0 | Status: SHIPPED | OUTPATIENT
Start: 2023-03-15 | End: 2023-03-20

## 2023-03-15 NOTE — TELEPHONE ENCOUNTER
Spoke with patients daughter pedro, notified rx erx to pharmacy.  Patient verbalized understanding -DN

## 2023-03-15 NOTE — TELEPHONE ENCOUNTER
----- Message from Amber Carrillo sent at 3/15/2023  3:06 PM CDT -----  Pt daughter called to get status of previous message about her mother. [phone]-Adriana

## 2023-03-27 RX ORDER — TRAMADOL HYDROCHLORIDE 50 MG/1
50 TABLET ORAL EVERY 6 HOURS PRN
Qty: 90 TABLET | Refills: 2 | Status: SHIPPED | OUTPATIENT
Start: 2023-03-27

## 2023-03-27 NOTE — TELEPHONE ENCOUNTER
----- Message from Amber Carrillo sent at 3/27/2023  3:29 PM CDT -----  Refill on: [facility]    [id number] - [place], [place] - [address] [facility]    [phone]

## 2023-03-27 NOTE — TELEPHONE ENCOUNTER
The patient's prescription has been approved and sent to   [facility][id number] - [place] - [address] AT [facility]  [address]  [place] [address]  Phone: [phone] Fax: [phone]

## 2023-04-14 DIAGNOSIS — Z12.31 OTHER SCREENING MAMMOGRAM: ICD-10-CM

## 2023-05-19 ENCOUNTER — PATIENT OUTREACH (OUTPATIENT)
Dept: ADMINISTRATIVE | Facility: HOSPITAL | Age: 73
End: 2023-05-19
Payer: COMMERCIAL

## 2023-05-19 NOTE — LETTER
May 19, 2023    Lizette Palafox  [address]  [facility] [address]             [facility]  [address]  [place] [address]  Phone: [phone] Lizette Palafox  [address]  [facility] [address]    Dear Lizette Palafox,    [facility] is committed to your overall health. To help you get the most out of each of your visits, we will review your information to make sure you are up to date on all of your recommended tests and/or procedures.      Dr. Josh Mccord MD  has found that your chart shows you may be due for       Health Maintenance Due   Topic Date Due    Hepatitis C Screening  Never done    COVID-19 Vaccine (1) Never done    Eye Exam  Never done    TETANUS VACCINE  Never done    Mammogram  Never done    Shingles Vaccine (1 of 2) Never done    DEXA Scan  01/04/2020    Diabetes Urine Screening  03/02/2021    Hemoglobin A1c  05/18/2022    Colorectal Cancer Screening  06/05/2022    Foot Exam  11/18/2022    Lipid Panel  11/18/2022      If you have had any of the above done at another facility, please let me know and I will request a copy of your report so that your record at [facility] will be complete. If you would like to schedule this/ any of these, please contact the clinic at [phone].    Thank You,    Your [facility] Team,  MD Jayla Huitron LPN, [facility]  Phone [phone]  Fax [phone]

## 2023-05-19 NOTE — PROGRESS NOTES
Population Health Chart Review & Patient Outreach Details:     Reason for Outreach Encounter:     []  Non-Compliant Report   []  Payor Report ([employer], PHN, BCBS, MSSP, MCIP, U[employer], etc.)   []  Pre-Visit Chart Review     Updates Requested / Reviewed:     []  Care Everywhere    [x]     [x]  External Sources ([facility], [facility], [facility], etc.)   [x]  Care Team Updated    Patient Outreach Method:    []  Telephone Outreach Completed   [] Successful   [] Left Voicemail   [] Unable to Contact (wrong number, no voicemail)  []  [facility] Outreach Sent  [x]  Letter Outreach Mailed  []  Fax Sent for External Records  []  External Records Upload    Health Maintenance Topics Addressed and Outreach Outcomes / Actions Taken:        [x]      Breast Cancer Screening []  Mammo Scheduled      []  External Records Requested     []  Added Reminder to Complete to Upcoming Primary Care Appt Notes     []  Patient Declined     []  Patient Will Call Back to Schedule     []  Patient Will Schedule with External Provider / Order Routed if Applicable             []       Cervical Cancer Screening []  Pap Scheduled      []  External Records Requested     []  Added Reminder to Complete to Upcoming Primary Care Appt Notes     []  Patient Declined     []  Patient Will Call Back to Schedule     []  Patient Will Schedule with External Provider               []          Colorectal Cancer Screening []  Colonoscopy Case Request or Referral Placed     []  External Records Requested     []  Added Reminder to Complete to Upcoming Primary Care Appt Notes     []  Patient Declined     []  Patient Will Call Back to Schedule     []  Patient Will Schedule with External Provider     []  Fit Kit Mailed (add the SmartPhrase under additional notes)     []  Reminded Patient to Complete Home Test             []      Diabetic Eye Exam []  Eye Camera Scheduled or Optometry Referral Placed     []  External Records Requested     []  Added Reminder to Complete to  Upcoming Primary Care Appt Notes     []  Patient Declined     []  Patient Will Call Back to Schedule     []  Patient Will Schedule with External Provider             []      Blood Pressure Control []  Primary Care Follow Up Visit Scheduled     []  Remote Blood Pressure Reading Captured     []  Added Reminder to Complete to Upcoming Primary Care Appt Notes     []  Patient Declined     []  Patient Will Call Back / Patient Will Send Portal Message with Reading     []  Patient Will Call Back to Schedule Provider Visit             []       HbA1c & Other Labs []  Lab Appt Scheduled for Due Labs     []  Primary Care Follow Up Visit Scheduled      []  Reminded Patient to Complete Home Test     []  Added Reminder to Complete to Upcoming Primary Care Appt Notes     []  Patient Declined     []  Patient Will Call Back to Schedule     []  Patient Will Schedule with External Provider / Order Routed if Applicable           []    Schedule Primary Care Appt []  Primary Care Appt Scheduled     []  Patient Declined     []  Patient Will Call Back to Schedule     []  Pt Established with External Provider & Updated Care Team             []      Medication Adherence []  Primary Care Appointment Scheduled     []  Added Reminder to Upcoming Primary Care Appt Notes     []  Patient Reminded to  Prescription     []  Patient Declined, Provider Notified if Needed     []  Sent Provider Message to Review and/or Add Exclusion to Problem List             []      Osteoporosis Screening []  DXA Appointment Scheduled     []  External Records Requested     []  Added Reminder to Complete to Upcoming Primary Care Appt Notes     []  Patient Declined     []  Patient Will Call Back to Schedule     []  Patient Will Schedule with External Provider / Order Routed if Applicable     Additional Care Coordinator Notes:    Immunizations reviewed.     Further Action Needed If Patient Returns Outreach:

## 2023-05-30 ENCOUNTER — OFFICE VISIT (OUTPATIENT)
Dept: FAMILY MEDICINE | Facility: CLINIC | Age: 73
End: 2023-05-30
Payer: COMMERCIAL

## 2023-05-30 DIAGNOSIS — E78.2 MIXED HYPERLIPIDEMIA: ICD-10-CM

## 2023-05-30 DIAGNOSIS — K21.9 GASTROESOPHAGEAL REFLUX DISEASE WITHOUT ESOPHAGITIS: ICD-10-CM

## 2023-05-30 DIAGNOSIS — E03.9 HYPOTHYROIDISM (ACQUIRED): Primary | ICD-10-CM

## 2023-05-30 DIAGNOSIS — I10 ESSENTIAL HYPERTENSION: ICD-10-CM

## 2023-05-30 DIAGNOSIS — M10.9 GOUT, UNSPECIFIED CAUSE, UNSPECIFIED CHRONICITY, UNSPECIFIED SITE: ICD-10-CM

## 2023-05-30 DIAGNOSIS — M54.16 LUMBAR RADICULOPATHY: ICD-10-CM

## 2023-05-30 DIAGNOSIS — Z79.899 ENCOUNTER FOR LONG-TERM CURRENT USE OF HIGH RISK MEDICATION: ICD-10-CM

## 2023-05-30 DIAGNOSIS — M05.79 RHEUMATOID ARTHRITIS INVOLVING MULTIPLE SITES WITH POSITIVE RHEUMATOID FACTOR: ICD-10-CM

## 2023-05-30 PROCEDURE — 3078F DIAST BP <80 MM HG: CPT | Mod: CPTII,S$GLB,, | Performed by: FAMILY MEDICINE

## 2023-05-30 PROCEDURE — 3008F PR BODY MASS INDEX (BMI) DOCUMENTED: ICD-10-PCS | Mod: CPTII,S$GLB,, | Performed by: FAMILY MEDICINE

## 2023-05-30 PROCEDURE — 99214 OFFICE O/P EST MOD 30 MIN: CPT | Mod: S$GLB,,, | Performed by: FAMILY MEDICINE

## 2023-05-30 PROCEDURE — 3008F BODY MASS INDEX DOCD: CPT | Mod: CPTII,S$GLB,, | Performed by: FAMILY MEDICINE

## 2023-05-30 PROCEDURE — 99214 PR OFFICE/OUTPT VISIT, EST, LEVL IV, 30-39 MIN: ICD-10-PCS | Mod: S$GLB,,, | Performed by: FAMILY MEDICINE

## 2023-05-30 PROCEDURE — 3078F PR MOST RECENT DIASTOLIC BLOOD PRESSURE < 80 MM HG: ICD-10-PCS | Mod: CPTII,S$GLB,, | Performed by: FAMILY MEDICINE

## 2023-05-30 PROCEDURE — 3074F PR MOST RECENT SYSTOLIC BLOOD PRESSURE < 130 MM HG: ICD-10-PCS | Mod: CPTII,S$GLB,, | Performed by: FAMILY MEDICINE

## 2023-05-30 PROCEDURE — 3074F SYST BP LT 130 MM HG: CPT | Mod: CPTII,S$GLB,, | Performed by: FAMILY MEDICINE

## 2023-05-30 RX ORDER — ALLOPURINOL 300 MG/1
300 TABLET ORAL 2 TIMES DAILY
Qty: 180 TABLET | Refills: 1 | Status: SHIPPED | OUTPATIENT
Start: 2023-05-30

## 2023-05-30 RX ORDER — LEVOTHYROXINE SODIUM 150 UG/1
150 TABLET ORAL
Qty: 90 TABLET | Refills: 1 | Status: SHIPPED | OUTPATIENT
Start: 2023-05-30

## 2023-05-30 RX ORDER — METHOTREXATE 2.5 MG/1
20 TABLET ORAL
Qty: 32 TABLET | Refills: 5 | Status: SHIPPED | OUTPATIENT
Start: 2023-06-02

## 2023-05-30 RX ORDER — PRAVASTATIN SODIUM 40 MG/1
40 TABLET ORAL DAILY
Qty: 90 TABLET | Refills: 3 | Status: SHIPPED | OUTPATIENT
Start: 2023-05-30 | End: 2024-05-29

## 2023-05-30 RX ORDER — OMEPRAZOLE 40 MG/1
40 CAPSULE, DELAYED RELEASE ORAL DAILY
Qty: 90 CAPSULE | Refills: 1 | Status: SHIPPED | OUTPATIENT
Start: 2023-05-30

## 2023-05-30 RX ORDER — GABAPENTIN 300 MG/1
300 CAPSULE ORAL 2 TIMES DAILY
Qty: 180 CAPSULE | Refills: 1 | Status: SHIPPED | OUTPATIENT
Start: 2023-05-30

## 2023-05-30 NOTE — PROGRESS NOTES
SUBJECTIVE:    Patient ID: Lizette Palafox is a 72 y.o. female.    Chief Complaint: Thyroid Problem    Patient with type 2 diabetes, hypothyroidism and rheumatoid arthritis presents for her regular visit.  Medication review reveals that patient has been out of her statin medication.  It apparently has been months.  She is not sure the last time it was received.  She reports she feels good.  No falls or injuries.  She has had recent history of several UTIs but denies any urine symptoms currently.   Rheumatoid arthritis managed with methotrexate.  Denies acute pain flares.  Continues on allopurinol for gout and levothyroxine for thyroid.  Labs are required for monitoring of her chronic diseases.  History of hypertension is notably well-controlled on current therapy.      Past Medical History:   Diagnosis Date    Anemia, unspecified 2021    Arthritis     Rheumatoid    Encounter for blood transfusion     GERD (gastroesophageal reflux disease)     Gout     Hypertension     Macrocytosis 2021    Thyroid disease     Transfusion reaction      Past Surgical History:   Procedure Laterality Date    APPENDECTOMY       SECTION      x3    DILATION AND CURETTAGE OF UTERUS      GALLBLADDER SURGERY      HYSTERECTOMY      TOTAL HIP ARTHROPLASTY       Family History   Problem Relation Age of Onset    No Known Problems Mother     No Known Problems Father        Marital Status: Legally   Alcohol History:  reports that she does not currently use alcohol.  Tobacco History:  reports that she has never smoked. She has never used smokeless tobacco.  Drug History:  reports no history of drug use.    Review of patient's allergies indicates:   Allergen Reactions    Levaquin [levofloxacin] Hives and Edema    Tramadol Nausea And Vomiting       Current Outpatient Medications:     allopurinoL (ZYLOPRIM) 300 MG tablet, Take 1 tablet (300 mg total) by mouth 2 (two) times daily. For gout prevention, Disp: 180 tablet, Rfl:  1    aspirin 81 MG Chew, Take 81 mg by mouth once daily. , Disp: , Rfl:     carvediloL (COREG) 3.125 MG tablet, TAKE 1 TABLET(3.125 MG) BY MOUTH TWICE DAILY WITH MEALS, Disp: 180 tablet, Rfl: 3    desloratadine (CLARINEX) 5 mg tablet, Take 1 tablet (5 mg total) by mouth once daily. For allergies, Disp: 90 tablet, Rfl: 1    ferrous sulfate (IRON ORAL), Take 1 tablet by mouth once daily., Disp: , Rfl:     gabapentin (NEURONTIN) 300 MG capsule, Take 1 capsule (300 mg total) by mouth 2 (two) times daily. For nerve pain, Disp: 180 capsule, Rfl: 1    ketoconazole (NIZORAL) 2 % cream, Apply topically once daily., Disp: , Rfl:     levothyroxine (SYNTHROID) 150 MCG tablet, Take 1 tablet (150 mcg total) by mouth before breakfast. For thryoid, Disp: 90 tablet, Rfl: 1    methotrexate 2.5 MG Tab, Take 8 tablets (20 mg total) by mouth Every Friday., Disp: 32 tablet, Rfl: 5    omeprazole (PRILOSEC) 40 MG capsule, Take 1 capsule (40 mg total) by mouth once daily. For heartburn, Disp: 90 capsule, Rfl: 1    pravastatin (PRAVACHOL) 40 MG tablet, Take 1 tablet (40 mg total) by mouth once daily., Disp: 90 tablet, Rfl: 3    traMADoL (ULTRAM) 50 mg tablet, Take 1 tablet (50 mg total) by mouth every 6 (six) hours as needed for Pain., Disp: 90 tablet, Rfl: 2    triamcinolone acetonide 0.025% (KENALOG) 0.025 % cream, Apply a thin layer to affected areas 1-2 times a day when having a flare up, Disp: 80 g, Rfl: 4    valsartan (DIOVAN) 80 MG tablet, Take 1 tablet (80 mg total) by mouth once daily., Disp: 90 tablet, Rfl: 3    vitamin D (VITAMIN D3) 1000 units Tab, Take 2,000 Units by mouth once daily. , Disp: , Rfl:     Review of Systems   Constitutional:  Negative for activity change, fatigue and unexpected weight change.   HENT:  Negative for hearing loss, postnasal drip, sinus pressure, sore throat and voice change.    Eyes:  Negative for photophobia and visual disturbance.   Respiratory:  Negative for cough, shortness of breath and wheezing.  "   Cardiovascular:  Negative for chest pain and palpitations.   Gastrointestinal:  Negative for constipation, diarrhea and nausea.   Genitourinary:  Negative for difficulty urinating, frequency, hematuria and urgency.   Musculoskeletal:  Positive for arthralgias. Negative for back pain.   Skin:  Negative for rash.   Neurological:  Negative for weakness, light-headedness and headaches.   Hematological:  Negative for adenopathy. Does not bruise/bleed easily.   Psychiatric/Behavioral:  The patient is not nervous/anxious.         Objective:      Vitals:    05/30/23 1503   BP: 100/68   Pulse: 78   SpO2: 98%   Weight: 74.4 kg (164 lb)   Height: 5' 2" (1.575 m)     Physical Exam  Constitutional:       Appearance: Normal appearance.      Comments: Ambulates with cane   HENT:      Head: Normocephalic and atraumatic.      Mouth/Throat:      Mouth: Mucous membranes are moist.   Eyes:      Conjunctiva/sclera: Conjunctivae normal.   Pulmonary:      Effort: Pulmonary effort is normal.   Musculoskeletal:         General: Deformity present. No swelling.   Neurological:      General: No focal deficit present.      Mental Status: She is alert and oriented to person, place, and time.   Psychiatric:         Mood and Affect: Mood normal.         Behavior: Behavior normal.         Assessment:       1. Hypothyroidism (acquired)    2. Essential hypertension    3. Rheumatoid arthritis involving multiple sites with positive rheumatoid factor    4. Mixed hyperlipidemia    5. Gout, unspecified cause, unspecified chronicity, unspecified site    6. Gastroesophageal reflux disease without esophagitis    7. Lumbar radiculopathy    8. Encounter for long-term current use of high risk medication         Plan:       Hypothyroidism (acquired)  -     CBC Auto Differential; Future; Expected date: 05/30/2023  -     Comprehensive Metabolic Panel; Future; Expected date: 05/30/2023  -     TSH w/reflex to FT4; Future; Expected date: 05/30/2023  -     " levothyroxine (SYNTHROID) 150 MCG tablet; Take 1 tablet (150 mcg total) by mouth before breakfast. For thryoid  Dispense: 90 tablet; Refill: 1    Essential hypertension  -     CBC Auto Differential; Future; Expected date: 05/30/2023  -     Microalbumin/Creatinine Ratio, Urine; Future; Expected date: 05/30/2023  -     Comprehensive Metabolic Panel; Future; Expected date: 05/30/2023    Rheumatoid arthritis involving multiple sites with positive rheumatoid factor  -     Rheumatoid Factor; Future; Expected date: 05/30/2023  -     methotrexate 2.5 MG Tab; Take 8 tablets (20 mg total) by mouth Every Friday.  Dispense: 32 tablet; Refill: 5    Mixed hyperlipidemia  -     Lipid Panel; Future; Expected date: 05/30/2023  -     Comprehensive Metabolic Panel; Future; Expected date: 05/30/2023  -     pravastatin (PRAVACHOL) 40 MG tablet; Take 1 tablet (40 mg total) by mouth once daily.  Dispense: 90 tablet; Refill: 3    Gout, unspecified cause, unspecified chronicity, unspecified site  -     Uric Acid; Future; Expected date: 05/30/2023  -     allopurinoL (ZYLOPRIM) 300 MG tablet; Take 1 tablet (300 mg total) by mouth 2 (two) times daily. For gout prevention  Dispense: 180 tablet; Refill: 1    Gastroesophageal reflux disease without esophagitis  -     omeprazole (PRILOSEC) 40 MG capsule; Take 1 capsule (40 mg total) by mouth once daily. For heartburn  Dispense: 90 capsule; Refill: 1    Lumbar radiculopathy  -     gabapentin (NEURONTIN) 300 MG capsule; Take 1 capsule (300 mg total) by mouth 2 (two) times daily. For nerve pain  Dispense: 180 capsule; Refill: 1    Encounter for long-term current use of high risk medication  -     CBC Auto Differential; Future; Expected date: 05/30/2023      Follow up in about 6 months (around 11/30/2023) for HTN, thyroid.

## 2023-06-10 VITALS
SYSTOLIC BLOOD PRESSURE: 100 MMHG | HEIGHT: 62 IN | BODY MASS INDEX: 30.18 KG/M2 | OXYGEN SATURATION: 98 % | HEART RATE: 78 BPM | DIASTOLIC BLOOD PRESSURE: 68 MMHG | WEIGHT: 164 LBS

## 2023-06-13 ENCOUNTER — HOSPITAL ENCOUNTER (EMERGENCY)
Facility: HOSPITAL | Age: 73
Discharge: HOME | End: 2023-06-13
Attending: EMERGENCY MEDICINE
Payer: COMMERCIAL

## 2023-06-13 VITALS
DIASTOLIC BLOOD PRESSURE: 84 MMHG | HEART RATE: 74 BPM | WEIGHT: 149 LBS | OXYGEN SATURATION: 98 % | RESPIRATION RATE: 18 BRPM | TEMPERATURE: 98 F | BODY MASS INDEX: 27.25 KG/M2 | SYSTOLIC BLOOD PRESSURE: 127 MMHG

## 2023-06-13 DIAGNOSIS — R53.1 WEAKNESS: ICD-10-CM

## 2023-06-13 DIAGNOSIS — M19.90 ARTHRITIS: ICD-10-CM

## 2023-06-13 DIAGNOSIS — M25.531 WRIST PAIN, ACUTE, RIGHT: Primary | ICD-10-CM

## 2023-06-13 LAB
ALBUMIN SERPL BCP-MCNC: 3.9 G/DL (ref 3.5–5.2)
ALP SERPL-CCNC: 75 U/L (ref 55–135)
ALT SERPL W/O P-5'-P-CCNC: 12 U/L (ref 10–44)
ANION GAP SERPL CALC-SCNC: 7 MMOL/L (ref 8–16)
AST SERPL-CCNC: 22 U/L (ref 10–40)
BASOPHILS # BLD AUTO: 0.07 K/UL (ref 0–0.2)
BASOPHILS NFR BLD: 1 % (ref 0–1.9)
BILIRUB SERPL-MCNC: 0.6 MG/DL (ref 0.1–1)
BUN SERPL-MCNC: 24 MG/DL (ref 8–23)
CALCIUM SERPL-MCNC: 9.5 MG/DL (ref 8.7–10.5)
CHLORIDE SERPL-SCNC: 108 MMOL/L (ref 95–110)
CO2 SERPL-SCNC: 20 MMOL/L (ref 23–29)
CREAT SERPL-MCNC: 1.1 MG/DL (ref 0.5–1.4)
DIFFERENTIAL METHOD: ABNORMAL
EOSINOPHIL # BLD AUTO: 0.2 K/UL (ref 0–0.5)
EOSINOPHIL NFR BLD: 3.4 % (ref 0–8)
ERYTHROCYTE [DISTWIDTH] IN BLOOD BY AUTOMATED COUNT: 13.4 % (ref 11.5–14.5)
EST. GFR  (NO RACE VARIABLE): 53.1 ML/MIN/1.73 M^2
GLUCOSE SERPL-MCNC: 149 MG/DL (ref 70–110)
HCT VFR BLD AUTO: 33.1 % (ref 37–48.5)
HGB BLD-MCNC: 11.2 G/DL (ref 12–16)
IMM GRANULOCYTES # BLD AUTO: 0.02 K/UL (ref 0–0.04)
IMM GRANULOCYTES NFR BLD AUTO: 0.3 % (ref 0–0.5)
LYMPHOCYTES # BLD AUTO: 1.1 K/UL (ref 1–4.8)
LYMPHOCYTES NFR BLD: 15.8 % (ref 18–48)
MAGNESIUM SERPL-MCNC: 1.6 MG/DL (ref 1.6–2.6)
MCH RBC QN AUTO: 33.8 PG (ref 27–31)
MCHC RBC AUTO-ENTMCNC: 33.8 G/DL (ref 32–36)
MCV RBC AUTO: 100 FL (ref 82–98)
MONOCYTES # BLD AUTO: 0.6 K/UL (ref 0.3–1)
MONOCYTES NFR BLD: 9.1 % (ref 4–15)
NEUTROPHILS # BLD AUTO: 4.9 K/UL (ref 1.8–7.7)
NEUTROPHILS NFR BLD: 70.4 % (ref 38–73)
NRBC BLD-RTO: 0 /100 WBC
PLATELET # BLD AUTO: 329 K/UL (ref 150–450)
PMV BLD AUTO: 9.2 FL (ref 9.2–12.9)
POTASSIUM SERPL-SCNC: 3.7 MMOL/L (ref 3.5–5.1)
PROT SERPL-MCNC: 7.6 G/DL (ref 6–8.4)
RBC # BLD AUTO: 3.31 M/UL (ref 4–5.4)
SODIUM SERPL-SCNC: 135 MMOL/L (ref 136–145)
WBC # BLD AUTO: 6.96 K/UL (ref 3.9–12.7)

## 2023-06-13 PROCEDURE — 80053 COMPREHEN METABOLIC PANEL: CPT

## 2023-06-13 PROCEDURE — 63600175 PHARM REV CODE 636 W HCPCS: Performed by: EMERGENCY MEDICINE

## 2023-06-13 PROCEDURE — 93010 EKG 12-LEAD: ICD-10-PCS | Mod: ,,, | Performed by: SPECIALIST

## 2023-06-13 PROCEDURE — 85025 COMPLETE CBC W/AUTO DIFF WBC: CPT

## 2023-06-13 PROCEDURE — 83735 ASSAY OF MAGNESIUM: CPT

## 2023-06-13 PROCEDURE — 93005 ELECTROCARDIOGRAM TRACING: CPT | Performed by: SPECIALIST

## 2023-06-13 PROCEDURE — 93010 ELECTROCARDIOGRAM REPORT: CPT | Mod: ,,, | Performed by: SPECIALIST

## 2023-06-13 PROCEDURE — 99285 EMERGENCY DEPT VISIT HI MDM: CPT | Mod: 25

## 2023-06-13 RX ORDER — MELOXICAM 15 MG/1
15 TABLET ORAL DAILY
Qty: 10 TABLET | Refills: 0 | Status: SHIPPED | OUTPATIENT
Start: 2023-06-13 | End: 2023-06-20

## 2023-06-13 RX ORDER — KETOROLAC TROMETHAMINE 30 MG/ML
10 INJECTION, SOLUTION INTRAMUSCULAR; INTRAVENOUS
Status: COMPLETED | OUTPATIENT
Start: 2023-06-13 | End: 2023-06-13

## 2023-06-13 RX ADMIN — KETOROLAC TROMETHAMINE 10 MG: 30 INJECTION, SOLUTION INTRAMUSCULAR; INTRAVENOUS at 05:06

## 2023-06-13 NOTE — ED NOTES
Pt ambulates with assistance of rollator.  Pt noted to be ambulating without difficulty with rollator and family by her side.

## 2023-06-13 NOTE — ED PROVIDER NOTES
"Encounter Date: 2023       History     Chief Complaint   Patient presents with    Extremity Weakness     Right arm. States 4 days ago she had increased weakness and decreased , Can't close her hand and now has a "lump" on wrist in several space     Emergent evaluation of a 72-year-old female with history of anemia, GERD, hypothyroidism, hypertension, rheumatoid arthritis who presents to the ER due to 2 day of swelling, and pain to right wrist dorsal and ventral with "nodules" on each side of the wrist and pain radiating into the 1-3rd metacarpals. This causes pain and decreased  strength and decreased ROM of wrist. Mild warmth. No injury- fall strain or heavy lifting. No fever. No pain proximally. No neck or upper arm pain or elbow pain. No numbess or tingling.     Review of patient's allergies indicates:   Allergen Reactions    Levaquin [levofloxacin] Hives and Edema    Tramadol Nausea And Vomiting     Past Medical History:   Diagnosis Date    Anemia, unspecified 2021    Arthritis     Rheumatoid    Encounter for blood transfusion     GERD (gastroesophageal reflux disease)     Gout     Hypertension     Macrocytosis 2021    Thyroid disease     Transfusion reaction      Past Surgical History:   Procedure Laterality Date    APPENDECTOMY       SECTION      x3    DILATION AND CURETTAGE OF UTERUS      GALLBLADDER SURGERY      HYSTERECTOMY      TOTAL HIP ARTHROPLASTY       Family History   Problem Relation Age of Onset    No Known Problems Mother     No Known Problems Father      Social History     Tobacco Use    Smoking status: Never    Smokeless tobacco: Never   Substance Use Topics    Alcohol use: Not Currently    Drug use: Never     Review of Systems   Constitutional:  Negative for activity change, appetite change, chills, diaphoresis, fatigue and fever.   HENT:  Negative for congestion, postnasal drip, rhinorrhea and sore throat.    Respiratory:  Negative for cough, chest tightness, " shortness of breath, wheezing and stridor.    Cardiovascular:  Negative for chest pain and palpitations.   Gastrointestinal:  Negative for abdominal distention, abdominal pain, blood in stool, constipation, diarrhea, nausea and vomiting.   Genitourinary:  Negative for dysuria, flank pain, frequency, hematuria and urgency.   Musculoskeletal:  Positive for arthralgias, joint swelling and myalgias. Negative for back pain, neck pain and neck stiffness.   Skin:  Negative for rash.   Neurological:  Positive for weakness. Negative for dizziness, syncope, light-headedness, numbness and headaches.   Hematological:  Does not bruise/bleed easily.   Psychiatric/Behavioral:  Negative for confusion. The patient is not nervous/anxious.    All other systems reviewed and are negative.    Physical Exam     Initial Vitals [06/13/23 1528]   BP Pulse Resp Temp SpO2   (!) 129/91 80 18 98.5 °F (36.9 °C) 97 %      MAP       --         Physical Exam    Nursing note and vitals reviewed.  Constitutional: She appears well-developed and well-nourished. She is not diaphoretic. No distress.   HENT:   Head: Normocephalic.   Neck:   Normal range of motion.  Cardiovascular:  Normal rate and intact distal pulses.           Pulmonary/Chest: No respiratory distress.   Musculoskeletal:         General: Tenderness and edema present.      Right shoulder: Normal.      Right upper arm: Normal.      Right elbow: Normal.      Right forearm: Normal.      Right wrist: Swelling, tenderness and bony tenderness present. No deformity, effusion, lacerations, snuff box tenderness or crepitus. Decreased range of motion. Normal pulse.      Right hand: Swelling, tenderness and bony tenderness present. No deformity or lacerations. Decreased range of motion. Normal strength. Normal sensation. There is no disruption of two-point discrimination. Normal capillary refill. Normal pulse.      Cervical back: Normal range of motion.     Neurological: She is alert and oriented to  person, place, and time. She has normal strength. No sensory deficit.   Mildly limited  strength due ot pain. 5/5 strength flexion and ext on all 5 fingers and wrist    Skin: Skin is warm and dry. Capillary refill takes less than 2 seconds. No rash and no abscess noted. No erythema. No pallor.   Psychiatric: She has a normal mood and affect.       ED Course   Procedures  Labs Reviewed   CBC W/ AUTO DIFFERENTIAL - Abnormal; Notable for the following components:       Result Value    RBC 3.31 (*)     Hemoglobin 11.2 (*)     Hematocrit 33.1 (*)      (*)     MCH 33.8 (*)     Lymph % 15.8 (*)     All other components within normal limits   COMPREHENSIVE METABOLIC PANEL - Abnormal; Notable for the following components:    Sodium 135 (*)     CO2 20 (*)     Glucose 149 (*)     BUN 24 (*)     Anion Gap 7 (*)     eGFR 53.1 (*)     All other components within normal limits   MAGNESIUM     EKG Readings: (Independently Interpreted)   Initial Reading: No STEMI. Rhythm: Normal Sinus Rhythm. Heart Rate: 74. Ectopy: No Ectopy. Conduction: Normal. Axis: Left Axis Deviation.   ECG Results              EKG 12-lead (In process)  Result time 06/13/23 15:38:31      In process by Interface, Lab In [facility] (06/13/23 15:38:31)                   Narrative:    Test Reason : R53.1,    Vent. Rate : 074 BPM     Atrial Rate : 074 BPM     P-R Int : 176 ms          QRS Dur : 084 ms      QT Int : 390 ms       P-R-T Axes : 013 -32 004 degrees     QTc Int : 432 ms    Normal sinus rhythm  Left axis deviation  Possible Lateral infarct (cited on or before 18-NOV-2020)  Abnormal ECG  When compared with ECG of 18-NOV-2020 14:40,  Questionable change in initial forces of Anterior-lateral leads  Nonspecific T wave abnormality now evident in Anterior leads    Referred By:  SELF           Confirmed By:                                   Imaging Results              X-Ray Hand 3 view Right (Final result)  Result time 06/13/23 17:44:02      Final result  by Jesus Cardoza MD (06/13/23 17:44:02)                   Narrative:      EXAM: XR WRIST COMPLETE 3 VIEWS RIGHT (accession [id number]), XR HAND COMPLETE 3 VIEW RIGHT (accession [id number])    HISTORY: Right wrist pain and weakness. Lump noted along the inferior aspect of the wrist    COMPARISON:Right wrist x-rays dated 11/5/2020    FINDINGS: 4 views of the right wrist and 3 views of the right hand were obtained. There is moderate osteoarthritis at the first carpometacarpal articulation with marked joint space narrowing and minimal bony erosion. This appears unchanged. The wrist joints are otherwise unremarkable. There is no evidence of recent or old fracture involving the wrist. 3 views of the right hand show mild narrowing of the DIP and PIP joints. There is no bony erosion. There is no evidence of recent or old fracture. Moderate diffuse osteopenia is also noted.    IMPRESSION:   Arthritic changes as described. Otherwise unremarkable x-rays of the right hand and wrist. There is no evidence of recent or old fracture    Electronically signed by:  Henry Cardoza MD  6/13/2023 5:44 PM CDT Workstation: [id number]                                     X-Ray Wrist Complete Right (Final result)  Result time 06/13/23 17:44:02      Final result by Jesus Cardoza MD (06/13/23 17:44:02)                   Narrative:      EXAM: XR WRIST COMPLETE 3 VIEWS RIGHT (accession [id number]), XR HAND COMPLETE 3 VIEW RIGHT (accession [id number])    HISTORY: Right wrist pain and weakness. Lump noted along the inferior aspect of the wrist    COMPARISON:Right wrist x-rays dated 11/5/2020    FINDINGS: 4 views of the right wrist and 3 views of the right hand were obtained. There is moderate osteoarthritis at the first carpometacarpal articulation with marked joint space narrowing and minimal bony erosion. This appears unchanged. The wrist joints are otherwise unremarkable. There is no evidence of recent or old fracture involving the  wrist. 3 views of the right hand show mild narrowing of the DIP and PIP joints. There is no bony erosion. There is no evidence of recent or old fracture. Moderate diffuse osteopenia is also noted.    IMPRESSION:   Arthritic changes as described. Otherwise unremarkable x-rays of the right hand and wrist. There is no evidence of recent or old fracture    Electronically signed by:  Henry Cardoza MD  6/13/2023 5:44 PM CDT Workstation: [id number]                                     CT Head Without Contrast (Final result)  Result time 06/13/23 16:13:15      Final result by Jayesh Burrell MD (06/13/23 16:13:15)                   Narrative:    CMS MANDATED QUALITY DATA - CT RADIATION  436 All CT scans at this facility utilize dose modulation, iterative reconstruction, and/or weight based dosing when appropriate to reduce radiation dose to as low as reasonably achievable.    CT HEAD WITHOUT IV CONTRAST, 6/13/2023 3:48 PM CDT    History:  Cranial neuropathy (CN 9).    Comparison: 11/5/2022    Findings:  Noncontrast CT imaging brain demonstrates mild diffuse generalized cortical atrophy, mild chronic periventricular small vessel white matter ischemic change, and probable old bilateral basal ganglia lacunar type infarcts. There is rounded hypodensity identified at the right side steve, suggesting old pontine infarct.    No focal edema, hemorrhage, mass lesion, hydrocephalus, brain herniation or abnormal intra-axial or extra-axial fluid collections are identified. The skull is intact. There is mild incidental left sphenoid sinusitis.    No acute intracranial abnormality otherwise is identified.    Impression:  1. Chronic type intracranial changes above, with rounded hypodensity at the right side inferior aspect steve suggesting technical artifact or old pontine infarct.  2. Mild incidental left sphenoid sinusitis.  3. No acute intracranial abnormality.    Electronically signed by:  Jayesh Burrell MD  6/13/2023 4:13 PM CDT  "Workstation: [id number]                                     X-Ray Chest PA And Lateral (Final result)  Result time 06/13/23 16:14:19      Final result by Jyaesh Burrell MD (06/13/23 16:14:19)                   Narrative:    XR CHEST 2 VIEWS, 6/13/2023 3:51 PM CDT    History: . Right-sided arm weakness    Comparison: None Available    Findings:  The right hemidiaphragm is elevated slightly anteriorly. There is linear left lingular lung scarring versus atelectasis. There is a moderate-sized sliding-type hiatal hernia within the retrocardiac posterior mediastinum. There are mild degenerative changes present throughout the length of the visualized thoracic spine. No radiographic evidence of acute cardiopulmonary disease is identified.    Impression:  Chronic type changes as above, with no acute abnormality.    Electronically signed by:  Jayesh Burrell MD  6/13/2023 4:14 PM CDT Workstation: [id number]                                  X-Rays:   Independently Interpreted Readings:   Chest X-Ray: Normal heart size.  No infiltrates.  No acute abnormalities.   Other Readings:  Xray right wrist and hand   Moderate arthritis change, no fx or dislocation seen   Medications   ketorolac injection 9.999 mg (9.999 mg Intravenous Given 6/13/23 1714)     Medical Decision Making:   Clinical Tests:   Lab Tests: Ordered and Reviewed       <> Summary of Lab: H/H 11.2/33.1    BUN 24  CO2 20  Mag 1.6    Radiological Study: Ordered and Reviewed  Medical Tests: Ordered and Reviewed  ED Management:  Emergent evaluation of a 72-year-old female with history of anemia, GERD, hypothyroidism, hypertension, rheumatoid arthritis who presents to the ER due to 2 day of swelling, and pain to right wrist dorsal and ventral with "nodules" on each side of the wrist and pain radiating into the 1-3rd metacarpals. This causes pain and decreased  strength and decreased ROM of wrist. Mild warmth. No injury- fall strain or heavy lifting. No " fever. No pain proximally. No neck or upper arm pain or elbow pain. No numbess or tingling.   VSS  On PE right wrist is mildly swollen most noticabely at distal radius with mild warmth and pain on ROM, no stiffness- not concerned forseptic arthritis, TTP into the 1-3 dr metacarpals without swelling. Mildly decreased strength on gripping due ot pain but when tested individually normal 5/5 flexion and ext of all digits.  MDM    Patient presents for emergent evaluation of acute right wrist pain and swellingthat poses a threat to life and/or bodily function.   Differential diagnosis includes but was not limited to RA, osteoarthritis, septic arthritis, CVA, radiculopathy. Nec fasc. Fracture dislocation or strain sprain. .   In the ED patient found to have acute right wrist pain and arthritis.     I ordered X-rays and personally reviewed them and reviewed the radiologist interpretation.  Xray significant for see above    Discharge MDM     Patient was managed in the ED with IV toradol. Will DC with mobic  The response to treatment was good   Patient was discharged in stable condition.  Detailed return precautions discussed.  Patient was told to follow up with primary care physician or specialist based on their diagnosis  Dayan Abdul MD                               Clinical Impression:   Final diagnoses:  [R53.1] Weakness  [M25.531] Wrist pain, acute, right (Primary)  [M19.90] Arthritis        ED Disposition Condition    Discharge Stable          ED Prescriptions       Medication Sig Dispense Start Date End Date Auth. Provider    meloxicam (MOBIC) 15 MG tablet Take 1 tablet (15 mg total) by mouth once daily. for 10 days 10 tablet 6/13/2023 6/23/2023 Dayan Abdul MD          Follow-up Information       Follow up With Specialties Details Why Contact Info Additional Information    Josh Mccord MD Family Medicine Schedule an appointment as soon as possible for a visit in 1 week If your symptoms do not  improve [address]  [address]  [facility] [address]  [phone]       [facility] - Emergency Dept Emergency Medicine Go to  If symptoms worsen [address] [facility] [address]  [phone] 1st floor             Dayan Abdul MD  06/13/23 3739

## 2023-06-19 ENCOUNTER — TELEPHONE (OUTPATIENT)
Dept: FAMILY MEDICINE | Facility: CLINIC | Age: 73
End: 2023-06-19

## 2023-06-19 NOTE — TELEPHONE ENCOUNTER
----- Message from Ana Cornelius MA sent at 6/19/2023  2:57 PM CDT -----  Regarding: er follow up appt  Pt daughter pedro calling to make an er follow up appt for the pt. Pt was seen in the er last Tuesday. [phone]

## 2023-06-20 ENCOUNTER — OFFICE VISIT (OUTPATIENT)
Dept: FAMILY MEDICINE | Facility: CLINIC | Age: 73
End: 2023-06-20
Attending: FAMILY MEDICINE
Payer: COMMERCIAL

## 2023-06-20 VITALS
DIASTOLIC BLOOD PRESSURE: 60 MMHG | WEIGHT: 167 LBS | HEIGHT: 61 IN | HEART RATE: 79 BPM | SYSTOLIC BLOOD PRESSURE: 108 MMHG | OXYGEN SATURATION: 99 % | BODY MASS INDEX: 31.53 KG/M2

## 2023-06-20 DIAGNOSIS — M05.79 RHEUMATOID ARTHRITIS INVOLVING MULTIPLE SITES WITH POSITIVE RHEUMATOID FACTOR: ICD-10-CM

## 2023-06-20 DIAGNOSIS — M10.031 ACUTE IDIOPATHIC GOUT OF RIGHT WRIST: Primary | ICD-10-CM

## 2023-06-20 DIAGNOSIS — M10.9 GOUT, UNSPECIFIED CAUSE, UNSPECIFIED CHRONICITY, UNSPECIFIED SITE: ICD-10-CM

## 2023-06-20 PROCEDURE — 1159F PR MEDICATION LIST DOCUMENTED IN MEDICAL RECORD: ICD-10-PCS | Mod: CPTII,S$GLB,, | Performed by: FAMILY MEDICINE

## 2023-06-20 PROCEDURE — 1159F MED LIST DOCD IN RCRD: CPT | Mod: CPTII,S$GLB,, | Performed by: FAMILY MEDICINE

## 2023-06-20 PROCEDURE — 1101F PT FALLS ASSESS-DOCD LE1/YR: CPT | Mod: CPTII,S$GLB,, | Performed by: FAMILY MEDICINE

## 2023-06-20 PROCEDURE — 3008F BODY MASS INDEX DOCD: CPT | Mod: CPTII,S$GLB,, | Performed by: FAMILY MEDICINE

## 2023-06-20 PROCEDURE — 99213 OFFICE O/P EST LOW 20 MIN: CPT | Mod: S$GLB,,, | Performed by: FAMILY MEDICINE

## 2023-06-20 PROCEDURE — 3078F DIAST BP <80 MM HG: CPT | Mod: CPTII,S$GLB,, | Performed by: FAMILY MEDICINE

## 2023-06-20 PROCEDURE — 3288F FALL RISK ASSESSMENT DOCD: CPT | Mod: CPTII,S$GLB,, | Performed by: FAMILY MEDICINE

## 2023-06-20 PROCEDURE — 1101F PR PT FALLS ASSESS DOC 0-1 FALLS W/OUT INJ PAST YR: ICD-10-PCS | Mod: CPTII,S$GLB,, | Performed by: FAMILY MEDICINE

## 2023-06-20 PROCEDURE — 4010F ACE/ARB THERAPY RXD/TAKEN: CPT | Mod: CPTII,S$GLB,, | Performed by: FAMILY MEDICINE

## 2023-06-20 PROCEDURE — 3074F PR MOST RECENT SYSTOLIC BLOOD PRESSURE < 130 MM HG: ICD-10-PCS | Mod: CPTII,S$GLB,, | Performed by: FAMILY MEDICINE

## 2023-06-20 PROCEDURE — 3078F PR MOST RECENT DIASTOLIC BLOOD PRESSURE < 80 MM HG: ICD-10-PCS | Mod: CPTII,S$GLB,, | Performed by: FAMILY MEDICINE

## 2023-06-20 PROCEDURE — 3008F PR BODY MASS INDEX (BMI) DOCUMENTED: ICD-10-PCS | Mod: CPTII,S$GLB,, | Performed by: FAMILY MEDICINE

## 2023-06-20 PROCEDURE — 99213 PR OFFICE/OUTPT VISIT, EST, LEVL III, 20-29 MIN: ICD-10-PCS | Mod: S$GLB,,, | Performed by: FAMILY MEDICINE

## 2023-06-20 PROCEDURE — 4010F PR ACE/ARB THEARPY RXD/TAKEN: ICD-10-PCS | Mod: CPTII,S$GLB,, | Performed by: FAMILY MEDICINE

## 2023-06-20 PROCEDURE — 3074F SYST BP LT 130 MM HG: CPT | Mod: CPTII,S$GLB,, | Performed by: FAMILY MEDICINE

## 2023-06-20 PROCEDURE — 3288F PR FALLS RISK ASSESSMENT DOCUMENTED: ICD-10-PCS | Mod: CPTII,S$GLB,, | Performed by: FAMILY MEDICINE

## 2023-06-20 RX ORDER — PREDNISONE 20 MG/1
40 TABLET ORAL DAILY
Qty: 8 TABLET | Refills: 0 | Status: SHIPPED | OUTPATIENT
Start: 2023-06-20 | End: 2023-06-24

## 2023-06-20 RX ORDER — FOLIC ACID 1 MG/1
1 TABLET ORAL DAILY
COMMUNITY

## 2023-06-20 NOTE — PROGRESS NOTES
SUBJECTIVE:    Patient ID: Lizette Palafox is a 72 y.o. female.    Chief Complaint: acute right wrist pain  (ER follow up for acute right wrist pain//mp)    Patient with history of gouty arthritis, hypertension and rheumatoid arthritis presents for follow-up visit.  She presented to the emergency room on 06/13/2023 with complaints of acute onset right wrist pain and swelling.  She had problems with range of motion as well as .  Lab work showed no specific abnormalities however uric acid was not performed.  X-ray demonstrated no acute fractures are deformities.  Arthritic changes present in both the hand and rest.  She was given a Toradol injection and discharged home on meloxicam.  She reports some improvement but still quite painful.  She continues on her weekly methotrexate and she uses allopurinol.  She has to keep the hand wrapped in Ace bandage for comfort  The patient with notable kyphosis and balance issues attempting        Admission on 06/13/2023, Discharged on 06/13/2023   Component Date Value Ref Range Status    WBC 06/13/2023 6.96  3.90 - 12.70 K/uL Final    RBC 06/13/2023 3.31 (L)  4.00 - 5.40 M/uL Final    Hemoglobin 06/13/2023 11.2 (L)  12.0 - 16.0 g/dL Final    Hematocrit 06/13/2023 33.1 (L)  37.0 - 48.5 % Final    MCV 06/13/2023 100 (H)  82 - 98 fL Final    MCH 06/13/2023 33.8 (H)  27.0 - 31.0 pg Final    MCHC 06/13/2023 33.8  32.0 - 36.0 g/dL Final    RDW 06/13/2023 13.4  11.5 - 14.5 % Final    Platelets 06/13/2023 329  150 - 450 K/uL Final    MPV 06/13/2023 9.2  9.2 - 12.9 fL Final    Immature Granulocytes 06/13/2023 0.3  0.0 - 0.5 % Final    Gran # (ANC) 06/13/2023 4.9  1.8 - 7.7 K/uL Final    Immature Grans (Abs) 06/13/2023 0.02  0.00 - 0.04 K/uL Final    Lymph # 06/13/2023 1.1  1.0 - 4.8 K/uL Final    Mono # 06/13/2023 0.6  0.3 - 1.0 K/uL Final    Eos # 06/13/2023 0.2  0.0 - 0.5 K/uL Final    Baso # 06/13/2023 0.07  0.00 - 0.20 K/uL Final    nRBC 06/13/2023 0  0 /100 WBC Final    Gran %  06/13/2023 70.4  38.0 - 73.0 % Final    Lymph % 06/13/2023 15.8 (L)  18.0 - 48.0 % Final    Mono % 06/13/2023 9.1  4.0 - 15.0 % Final    Eosinophil % 06/13/2023 3.4  0.0 - 8.0 % Final    Basophil % 06/13/2023 1.0  0.0 - 1.9 % Final    Differential Method 06/13/2023 Automated   Final    Sodium 06/13/2023 135 (L)  136 - 145 mmol/L Final    Potassium 06/13/2023 3.7  3.5 - 5.1 mmol/L Final    Chloride 06/13/2023 108  95 - 110 mmol/L Final    CO2 06/13/2023 20 (L)  23 - 29 mmol/L Final    Glucose 06/13/2023 149 (H)  70 - 110 mg/dL Final    BUN 06/13/2023 24 (H)  8 - 23 mg/dL Final    Creatinine 06/13/2023 1.1  0.5 - 1.4 mg/dL Final    Calcium 06/13/2023 9.5  8.7 - 10.5 mg/dL Final    Total Protein 06/13/2023 7.6  6.0 - 8.4 g/dL Final    Albumin 06/13/2023 3.9  3.5 - 5.2 g/dL Final    Total Bilirubin 06/13/2023 0.6  0.1 - 1.0 mg/dL Final    Alkaline Phosphatase 06/13/2023 75  55 - 135 U/L Final    AST 06/13/2023 22  10 - 40 U/L Final    ALT 06/13/2023 12  10 - 44 U/L Final    Anion Gap 06/13/2023 7 (L)  8 - 16 mmol/L Final    eGFR 06/13/2023 53.1 (A)  >60 mL/min/1.73 m^2 Final    Magnesium 06/13/2023 1.6  1.6 - 2.6 mg/dL Final   Admission on 02/16/2023, Discharged on 02/16/2023   Component Date Value Ref Range Status    Specimen UA 02/16/2023 Urine, Clean Catch   Final    Color, UA 02/16/2023 Yellow  Yellow, Straw, Vivian Final    Appearance, UA 02/16/2023 Clear  Clear Final    pH, UA 02/16/2023 6.0  5.0 - 8.0 Final    Specific Gravity, UA 02/16/2023 1.010  1.005 - 1.030 Final    Protein, UA 02/16/2023 Negative  Negative Final    Glucose, UA 02/16/2023 Negative  Negative Final    Ketones, UA 02/16/2023 Negative  Negative Final    Bilirubin (UA) 02/16/2023 Negative  Negative Final    Occult Blood UA 02/16/2023 Negative  Negative Final    Nitrite, UA 02/16/2023 Negative  Negative Final    Urobilinogen, UA 02/16/2023 Negative  Negative EU/dL Final    Leukocytes, UA 02/16/2023 Negative  Negative Final    WBC 02/16/2023 8.16   3.90 - 12.70 K/uL Final    RBC 02/16/2023 3.35 (L)  4.00 - 5.40 M/uL Final    Hemoglobin 02/16/2023 10.8 (L)  12.0 - 16.0 g/dL Final    Hematocrit 02/16/2023 34.0 (L)  37.0 - 48.5 % Final    MCV 02/16/2023 102 (H)  82 - 98 fL Final    MCH 02/16/2023 32.2 (H)  27.0 - 31.0 pg Final    MCHC 02/16/2023 31.8 (L)  32.0 - 36.0 g/dL Final    RDW 02/16/2023 14.6 (H)  11.5 - 14.5 % Final    Platelets 02/16/2023 336  150 - 450 K/uL Final    MPV 02/16/2023 8.9 (L)  9.2 - 12.9 fL Final    Immature Granulocytes 02/16/2023 1.0 (H)  0.0 - 0.5 % Final    Gran # (ANC) 02/16/2023 4.6  1.8 - 7.7 K/uL Final    Immature Grans (Abs) 02/16/2023 0.08 (H)  0.00 - 0.04 K/uL Final    Lymph # 02/16/2023 2.3  1.0 - 4.8 K/uL Final    Mono # 02/16/2023 0.6  0.3 - 1.0 K/uL Final    Eos # 02/16/2023 0.5  0.0 - 0.5 K/uL Final    Baso # 02/16/2023 0.09  0.00 - 0.20 K/uL Final    nRBC 02/16/2023 0  0 /100 WBC Final    Gran % 02/16/2023 56.9  38.0 - 73.0 % Final    Lymph % 02/16/2023 27.7  18.0 - 48.0 % Final    Mono % 02/16/2023 7.8  4.0 - 15.0 % Final    Eosinophil % 02/16/2023 5.5  0.0 - 8.0 % Final    Basophil % 02/16/2023 1.1  0.0 - 1.9 % Final    Differential Method 02/16/2023 Automated   Final    Sodium 02/16/2023 138  136 - 145 mmol/L Final    Potassium 02/16/2023 4.1  3.5 - 5.1 mmol/L Final    Chloride 02/16/2023 107  95 - 110 mmol/L Final    CO2 02/16/2023 23  23 - 29 mmol/L Final    Glucose 02/16/2023 97  70 - 110 mg/dL Final    BUN 02/16/2023 14  8 - 23 mg/dL Final    Creatinine 02/16/2023 0.9  0.5 - 1.4 mg/dL Final    Calcium 02/16/2023 9.1  8.7 - 10.5 mg/dL Final    Total Protein 02/16/2023 7.4  6.0 - 8.4 g/dL Final    Albumin 02/16/2023 3.6  3.5 - 5.2 g/dL Final    Total Bilirubin 02/16/2023 0.5  0.1 - 1.0 mg/dL Final    Alkaline Phosphatase 02/16/2023 74  55 - 135 U/L Final    AST 02/16/2023 19  10 - 40 U/L Final    ALT 02/16/2023 11  10 - 44 U/L Final    Anion Gap 02/16/2023 8  8 - 16 mmol/L Final    eGFR 02/16/2023 >60.0  >60  mL/min/1.73 m^2 Final        Past Medical History:   Diagnosis Date    Anemia, unspecified 2021    Arthritis     Rheumatoid    Encounter for blood transfusion     GERD (gastroesophageal reflux disease)     Gout     Hypertension     Macrocytosis 2021    Thyroid disease     Transfusion reaction      Past Surgical History:   Procedure Laterality Date    APPENDECTOMY       SECTION      x3    DILATION AND CURETTAGE OF UTERUS      GALLBLADDER SURGERY      HYSTERECTOMY      TOTAL HIP ARTHROPLASTY       Family History   Problem Relation Age of Onset    No Known Problems Mother     No Known Problems Father        Marital Status:   Alcohol History:  reports that she does not currently use alcohol.  Tobacco History:  reports that she has never smoked. She has never used smokeless tobacco.  Drug History:  reports no history of drug use.    Review of patient's allergies indicates:   Allergen Reactions    Levaquin [levofloxacin] Hives and Edema    Tramadol Nausea And Vomiting       Current Outpatient Medications:     allopurinoL (ZYLOPRIM) 300 MG tablet, Take 1 tablet (300 mg total) by mouth 2 (two) times daily. For gout prevention, Disp: 180 tablet, Rfl: 1    aspirin 81 MG Chew, Take 81 mg by mouth once daily. , Disp: , Rfl:     carvediloL (COREG) 3.125 MG tablet, TAKE 1 TABLET(3.125 MG) BY MOUTH TWICE DAILY WITH MEALS, Disp: 180 tablet, Rfl: 3    ferrous sulfate (IRON ORAL), Take 1 tablet by mouth once daily., Disp: , Rfl:     folic acid (FOLVITE) 1 MG tablet, Take 1 mg by mouth once daily., Disp: , Rfl:     gabapentin (NEURONTIN) 300 MG capsule, Take 1 capsule (300 mg total) by mouth 2 (two) times daily. For nerve pain, Disp: 180 capsule, Rfl: 1    ketoconazole (NIZORAL) 2 % cream, Apply topically once daily., Disp: , Rfl:     levothyroxine (SYNTHROID) 150 MCG tablet, Take 1 tablet (150 mcg total) by mouth before breakfast. For thryoid, Disp: 90 tablet, Rfl: 1    methotrexate 2.5 MG Tab, Take 8  "tablets (20 mg total) by mouth Every Friday., Disp: 32 tablet, Rfl: 5    omeprazole (PRILOSEC) 40 MG capsule, Take 1 capsule (40 mg total) by mouth once daily. For heartburn, Disp: 90 capsule, Rfl: 1    pravastatin (PRAVACHOL) 40 MG tablet, Take 1 tablet (40 mg total) by mouth once daily., Disp: 90 tablet, Rfl: 3    traMADoL (ULTRAM) 50 mg tablet, Take 1 tablet (50 mg total) by mouth every 6 (six) hours as needed for Pain., Disp: 90 tablet, Rfl: 2    triamcinolone acetonide 0.025% (KENALOG) 0.025 % cream, Apply a thin layer to affected areas 1-2 times a day when having a flare up, Disp: 80 g, Rfl: 4    valsartan (DIOVAN) 80 MG tablet, Take 1 tablet (80 mg total) by mouth once daily., Disp: 90 tablet, Rfl: 3    vitamin D (VITAMIN D3) 1000 units Tab, Take 2,000 Units by mouth once daily. , Disp: , Rfl:     predniSONE (DELTASONE) 20 MG tablet, Take 2 tablets (40 mg total) by mouth once daily. for 4 days, Disp: 8 tablet, Rfl: 0    Review of Systems   All other systems reviewed and are negative.       Objective:      Vitals:    06/20/23 1352   BP: 108/60   Pulse: 79   SpO2: 99%   Weight: 75.8 kg (167 lb)   Height: 5' 1.42" (1.56 m)     Physical Exam  Constitutional:       General: She is in acute distress.   Cardiovascular:      Rate and Rhythm: Normal rate.   Musculoskeletal:      Right wrist: Swelling and bony tenderness present. Decreased range of motion. Normal pulse.      Comments: Wrist is warm to touch but no erythema   Skin:     General: Skin is warm and dry.   Neurological:      Mental Status: She is alert.         Assessment:       1. Acute idiopathic gout of right wrist    2. Rheumatoid arthritis involving multiple sites with positive rheumatoid factor    3. Gout, unspecified cause, unspecified chronicity, unspecified site         Plan:       Acute idiopathic gout of right wrist  -     predniSONE (DELTASONE) 20 MG tablet; Take 2 tablets (40 mg total) by mouth once daily. for 4 days  Dispense: 8 tablet; Refill: " 0    Rheumatoid arthritis involving multiple sites with positive rheumatoid factor  -     [facility]ALKER FOR HOME USE    Gout, unspecified cause, unspecified chronicity, unspecified site      Follow up keep current.

## 2023-06-26 ENCOUNTER — PATIENT OUTREACH (OUTPATIENT)
Dept: ADMINISTRATIVE | Facility: HOSPITAL | Age: 73
End: 2023-06-26
Payer: COMMERCIAL

## 2023-06-26 NOTE — PROGRESS NOTES
Population Health Chart Review & Patient Outreach Details:     Reason for Outreach Encounter:     [x]  Non-Compliant Report   []  Payor Report (Hum[employer], PHN, BCBS, MSSP, MCIP, UHC, etc.)   []  Pre-Visit Chart Review     Updates Requested / Reviewed:     []  Care Everywhere    [x]     [x]  External Sources ([facility], [facility], DIS, etc.)   [x]  Care Team Updated    Patient Outreach Method:    []  Telephone Outreach Completed   [] Successful   [] Left Voicemail   [] Unable to Contact (wrong number, no voicemail)  []  [facility] Portal Outreach Sent  [x]  Letter Outreach Mailed  []  Fax Sent for External Records  []  External Records Upload    Health Maintenance Topics Addressed and Outreach Outcomes / Actions Taken:        [x]      Breast Cancer Screening []  Mammo Scheduled      []  External Records Requested     []  Added Reminder to Complete to Upcoming Primary Care Appt Notes     []  Patient Declined     []  Patient Will Call Back to Schedule     []  Patient Will Schedule with External Provider / Order Routed if Applicable             []       Cervical Cancer Screening []  Pap Scheduled      []  External Records Requested     []  Added Reminder to Complete to Upcoming Primary Care Appt Notes     []  Patient Declined     []  Patient Will Call Back to Schedule     []  Patient Will Schedule with External Provider               [x]          Colorectal Cancer Screening []  Colonoscopy Case Request or Referral Placed     []  External Records Requested     []  Added Reminder to Complete to Upcoming Primary Care Appt Notes     []  Patient Declined     []  Patient Will Call Back to Schedule     []  Patient Will Schedule with External Provider     []  Fit Kit Mailed (add the SmartPhrase under additional notes)     []  Reminded Patient to Complete Home Test             [x]      Diabetic Eye Exam []  Eye Camera Scheduled or Optometry Referral Placed     []  External Records Requested     []  Added Reminder to Complete  to Upcoming Primary Care Appt Notes     []  Patient Declined     []  Patient Will Call Back to Schedule     []  Patient Will Schedule with External Provider             []      Blood Pressure Control []  Primary Care Follow Up Visit Scheduled     []  Remote Blood Pressure Reading Captured     []  Added Reminder to Complete to Upcoming Primary Care Appt Notes     []  Patient Declined     []  Patient Will Call Back / Patient Will Send Portal Message with Reading     []  Patient Will Call Back to Schedule Provider Visit             [x]       HbA1c & Other Labs []  Lab Appt Scheduled for Due Labs     []  Primary Care Follow Up Visit Scheduled      []  Reminded Patient to Complete Home Test     []  Added Reminder to Complete to Upcoming Primary Care Appt Notes     []  Patient Declined     []  Patient Will Call Back to Schedule     []  Patient Will Schedule with External Provider / Order Routed if Applicable           []    Schedule Primary Care Appt []  Primary Care Appt Scheduled     []  Patient Declined     []  Patient Will Call Back to Schedule     []  Pt Established with External Provider & Updated Care Team             []      Medication Adherence []  Primary Care Appointment Scheduled     []  Added Reminder to Upcoming Primary Care Appt Notes     []  Patient Reminded to  Prescription     []  Patient Declined, Provider Notified if Needed     []  Sent Provider Message to Review and/or Add Exclusion to Problem List             [x]      Osteoporosis Screening []  DXA Appointment Scheduled     []  External Records Requested     []  Added Reminder to Complete to Upcoming Primary Care Appt Notes     []  Patient Declined     []  Patient Will Call Back to Schedule     []  Patient Will Schedule with External Provider / Order Routed if Applicable     Additional Care Coordinator Notes:         Further Action Needed If Patient Returns Outreach:

## 2023-06-26 NOTE — LETTER
June 26, 2023    Lizette Palafox  [address]  [facility] [address]             [facility]  [address]  [facility] [address]  Phone: [phone] [facility] is committed to your overall health.  To help you get the most out of each of your visits, we will review your information to make sure you are up to date on all of your recommended tests and/or procedures.       Your PCP  Josh Mccord MD   found that you may be due for:   Health Maintenance Due   Topic Date Due    Hepatitis C Screening  Never done    COVID-19 Vaccine (1) Never done    Eye Exam  Never done    TETANUS VACCINE  Never done    Mammogram  Never done    Shingles Vaccine (1 of 2) Never done    DEXA Scan  01/04/2020    Diabetes Urine Screening  03/02/2021    Hemoglobin A1c  05/18/2022    Colorectal Cancer Screening  06/05/2022    Foot Exam  11/18/2022    Lipid Panel  11/18/2022           If you have had any of the above done at another facility, please let us know where you went so that we can request your record. So that your chart with [facility] will be complete.  If you would like to schedule any of these, please contact us at [phone].      Thank you for choosing [facility] .    Jayla AGUILAR  Clinical Care Coordinator    [facility] / [facility]  [phone] (Phone)  [phone] (Fax)

## 2023-06-29 NOTE — TELEPHONE ENCOUNTER
----- Message from Amber Yeh MA sent at 6/29/2023 11:41 AM CDT -----  Regarding: cough  Sore throat and cough w/green phlem . For 4 days. Would like appt asap or medication called in.  [employer]  Daughter: pedro  [phone]

## 2023-06-29 NOTE — TELEPHONE ENCOUNTER
Spoke with patients daughter adriana.  States patient c/o sore throat and productive cough x4-5 days.  Adriana states no fever, no sinus drip.  Has not taken anything otc due to extensice med list.  States patient's biggest concern is productive cough.  Please advise.      [employer]    PRINTED

## 2023-06-30 RX ORDER — METHYLPREDNISOLONE 4 MG/1
TABLET ORAL
Qty: 21 EACH | Refills: 0 | Status: SHIPPED | OUTPATIENT
Start: 2023-06-30 | End: 2023-07-21

## 2023-06-30 RX ORDER — AMOXICILLIN AND CLAVULANATE POTASSIUM 875; 125 MG/1; MG/1
1 TABLET, FILM COATED ORAL 2 TIMES DAILY
Qty: 14 TABLET | Refills: 0 | Status: SHIPPED | OUTPATIENT
Start: 2023-06-30 | End: 2023-07-07

## 2023-06-30 NOTE — TELEPHONE ENCOUNTER
The patient's prescription has been approved and sent to   [id number] - [place], [place] - [address] [facility]  [address]  [place] [address]  Phone: [phone] Fax: [phone]

## 2023-06-30 NOTE — TELEPHONE ENCOUNTER
Per dr harris:  medrol dosepack.  Check blood sugar.  Stay hydrated.  Lean diet, no cheating.  Augmentin 875 po bid x7 days.  Mucinex plain otc if increase sob.      Spoke with patients daughter notified of above medications and message per dr harris.  Adriana verbalized understanding, informed her to return call with questions/concerns. -DN

## 2023-07-20 ENCOUNTER — TELEPHONE (OUTPATIENT)
Dept: FAMILY MEDICINE | Facility: CLINIC | Age: 73
End: 2023-07-20

## 2023-07-20 DIAGNOSIS — M05.79 RHEUMATOID ARTHRITIS INVOLVING MULTIPLE SITES WITH POSITIVE RHEUMATOID FACTOR: Primary | ICD-10-CM

## 2023-07-20 RX ORDER — METHYLPREDNISOLONE 4 MG/1
TABLET ORAL
Qty: 21 EACH | Refills: 0 | Status: SHIPPED | OUTPATIENT
Start: 2023-07-20 | End: 2023-08-10

## 2023-07-20 RX ORDER — HYDROCORTISONE AND ACETIC ACID 20.75; 10.375 MG/ML; MG/ML
3 SOLUTION AURICULAR (OTIC) 2 TIMES DAILY
Qty: 10 ML | Refills: 0 | Status: SHIPPED | OUTPATIENT
Start: 2023-07-20 | End: 2023-07-30

## 2023-07-20 NOTE — TELEPHONE ENCOUNTER
----- Message from Kinsey Erazo sent at 7/20/2023 10:51 AM CDT -----  The patient's daughter Adriana called yesterday. She was waiting a call back form the nurse. The patient is complaining of an earache and she can not hear out of her ear.[facility] on .She still needs a call from the nurse. Pt's daughter's  Adriana # [phone]

## 2023-07-20 NOTE — TELEPHONE ENCOUNTER
Spoke with patient c/o RA flare of both hands x4 days.   Tylenol and tramadol not helping with pain.  Requesting rx for relief.  Also c/o ear ache, started yesterday.  No other sxs at this time.  Is not taking anything otc for ear ache at this time. -DN    PRINTED

## 2023-07-20 NOTE — TELEPHONE ENCOUNTER
----- Message from Pili Flannery sent at 7/20/2023  2:36 PM CDT -----  Patient daughter  Adriana called and stated that she missed a call from the nurse on the patient. Please give her a call at [phone]

## 2023-08-01 ENCOUNTER — TELEPHONE (OUTPATIENT)
Dept: FAMILY MEDICINE | Facility: CLINIC | Age: 73
End: 2023-08-01

## 2023-08-01 ENCOUNTER — TELEPHONE (OUTPATIENT)
Dept: ORTHOPEDICS | Facility: CLINIC | Age: 73
End: 2023-08-01
Payer: COMMERCIAL

## 2023-08-01 NOTE — TELEPHONE ENCOUNTER
----- Message from Yamile Garcia sent at 7/31/2023 12:44 PM CDT -----  Contact: pedro daughter  Pt has an ear infection and would like to be seen as the ear drops are not working. She is in pain and can not hear out of either one   [phone] pedro daughter

## 2023-08-01 NOTE — TELEPHONE ENCOUNTER
Spoke with patient will report to urgent care for ear pain at this time.  Also reports trouble with hand pain, patient states she would like to be seen soon regarding hand pain and swelling.  Ov scheduled at [address] for bilateral hand pain with swelling -DN

## 2023-08-01 NOTE — TELEPHONE ENCOUNTER
----- Message from Sushil Brand sent at 7/31/2023  5:38 PM CDT -----  Contact: deepa  Type:  Sooner Apoointment Request    Caller is requesting a sooner appointment.  Caller declined first available appointment listed below.  Caller will not accept being placed on the waitlist and is requesting a message be sent to doctor.  Name of Caller:patient   When is the first available appointment?n/a  Symptoms:knee pain   Would the patient rather a call back or a response via [facility]? Call back  Best Call Back Number:[phone] or [phone]  Additional Information: pt stated she used to see Dr. Love

## 2023-08-01 NOTE — TELEPHONE ENCOUNTER
----- Message from Berhane Dave sent at 8/1/2023 10:25 AM CDT -----  Regarding: missed call, wants to be seen thurs or fri, call daughter Adriana Akins   Contact: daughter Adriana Akins   missed call, wants to be seen thurs or fri, call chai Akins [phone]

## 2023-08-02 DIAGNOSIS — M17.12 PRIMARY OSTEOARTHRITIS OF LEFT KNEE: Primary | ICD-10-CM

## 2023-08-04 ENCOUNTER — OFFICE VISIT (OUTPATIENT)
Dept: ORTHOPEDICS | Facility: CLINIC | Age: 73
End: 2023-08-04
Payer: COMMERCIAL

## 2023-08-04 ENCOUNTER — HOSPITAL ENCOUNTER (OUTPATIENT)
Dept: RADIOLOGY | Facility: HOSPITAL | Age: 73
Discharge: HOME | End: 2023-08-04
Attending: ORTHOPAEDIC SURGERY
Payer: COMMERCIAL

## 2023-08-04 DIAGNOSIS — M17.12 PRIMARY OSTEOARTHRITIS OF LEFT KNEE: Primary | ICD-10-CM

## 2023-08-04 DIAGNOSIS — M17.12 PRIMARY OSTEOARTHRITIS OF LEFT KNEE: ICD-10-CM

## 2023-08-04 PROCEDURE — 73562 XR KNEE ORTHO LEFT WITH FLEXION: ICD-10-PCS | Mod: 26,RT,, | Performed by: RADIOLOGY

## 2023-08-04 PROCEDURE — 99214 OFFICE O/P EST MOD 30 MIN: CPT | Mod: S$GLB,,, | Performed by: ORTHOPAEDIC SURGERY

## 2023-08-04 PROCEDURE — 73564 X-RAY EXAM KNEE 4 OR MORE: CPT | Mod: 26,LT,, | Performed by: RADIOLOGY

## 2023-08-04 PROCEDURE — 73562 X-RAY EXAM OF KNEE 3: CPT | Mod: 26,RT,, | Performed by: RADIOLOGY

## 2023-08-04 PROCEDURE — 73564 XR KNEE ORTHO LEFT WITH FLEXION: ICD-10-PCS | Mod: 26,LT,, | Performed by: RADIOLOGY

## 2023-08-04 PROCEDURE — 4010F ACE/ARB THERAPY RXD/TAKEN: CPT | Mod: CPTII,S$GLB,, | Performed by: ORTHOPAEDIC SURGERY

## 2023-08-04 PROCEDURE — 99214 PR OFFICE/OUTPT VISIT, EST, LEVL IV, 30-39 MIN: ICD-10-PCS | Mod: S$GLB,,, | Performed by: ORTHOPAEDIC SURGERY

## 2023-08-04 PROCEDURE — 4010F PR ACE/ARB THEARPY RXD/TAKEN: ICD-10-PCS | Mod: CPTII,S$GLB,, | Performed by: ORTHOPAEDIC SURGERY

## 2023-08-04 PROCEDURE — 73564 X-RAY EXAM KNEE 4 OR MORE: CPT | Mod: TC,PO,LT

## 2023-08-04 NOTE — PROGRESS NOTES
Patient ID: Lizette Palafox is a 73 y.o. female    Chief Complaint: No chief complaint on file.      History of Present Illness:    Very pleasant 73-year-old female with chronic left knee DJD who presents for evaluation of left knee pain into discuss surgical treatment options.  Previously scheduled for left total knee replacement a few years ago but had a death in the family and moved to [place].  She is back now.  She has severe left knee arthritis and is currently in a wheelchair.  She is able to walk short distances with a walker.  She has had injections in the past that no longer are effective.    PAST MEDICAL HISTORY:   Past Medical History:   Diagnosis Date    Anemia, unspecified 2021    Arthritis     Rheumatoid    Encounter for blood transfusion     GERD (gastroesophageal reflux disease)     Gout     Hypertension     Macrocytosis 2021    Personal history of colonic polyps 2012    Thyroid disease     Transfusion reaction      PAST SURGICAL HISTORY:   Past Surgical History:   Procedure Laterality Date    APPENDECTOMY       SECTION      x3    DILATION AND CURETTAGE OF UTERUS      GALLBLADDER SURGERY      HYSTERECTOMY      TOTAL HIP ARTHROPLASTY       FAMILY HISTORY:   Family History   Problem Relation Age of Onset    No Known Problems Mother     No Known Problems Father      SOCIAL HISTORY:   Social History     Occupational History    Not on file   Tobacco Use    Smoking status: Never    Smokeless tobacco: Never   Substance and Sexual Activity    Alcohol use: Not Currently    Drug use: Never    Sexual activity: Not on file        MEDICATIONS:   Current Outpatient Medications:     allopurinoL (ZYLOPRIM) 300 MG tablet, Take 1 tablet (300 mg total) by mouth 2 (two) times daily. For gout prevention, Disp: 180 tablet, Rfl: 1    aspirin 81 MG Chew, Take 81 mg by mouth once daily. , Disp: , Rfl:     carvediloL (COREG) 3.125 MG tablet, TAKE 1 TABLET(3.125 MG) BY MOUTH TWICE DAILY WITH MEALS,  Disp: 180 tablet, Rfl: 3    ferrous sulfate (IRON ORAL), Take 1 tablet by mouth once daily., Disp: , Rfl:     folic acid (FOLVITE) 1 MG tablet, Take 1 mg by mouth once daily., Disp: , Rfl:     gabapentin (NEURONTIN) 300 MG capsule, Take 1 capsule (300 mg total) by mouth 2 (two) times daily. For nerve pain, Disp: 180 capsule, Rfl: 1    ketoconazole (NIZORAL) 2 % cream, Apply topically once daily., Disp: , Rfl:     levothyroxine (SYNTHROID) 150 MCG tablet, Take 1 tablet (150 mcg total) by mouth before breakfast. For thryoid, Disp: 90 tablet, Rfl: 1    methotrexate 2.5 MG Tab, Take 8 tablets (20 mg total) by mouth Every Friday., Disp: 32 tablet, Rfl: 5    methylPREDNISolone (MEDROL DOSEPACK) 4 mg tablet, use as directed, Disp: 21 each, Rfl: 0    omeprazole (PRILOSEC) 40 MG capsule, Take 1 capsule (40 mg total) by mouth once daily. For heartburn, Disp: 90 capsule, Rfl: 1    pravastatin (PRAVACHOL) 40 MG tablet, Take 1 tablet (40 mg total) by mouth once daily., Disp: 90 tablet, Rfl: 3    traMADoL (ULTRAM) 50 mg tablet, Take 1 tablet (50 mg total) by mouth every 6 (six) hours as needed for Pain., Disp: 90 tablet, Rfl: 2    triamcinolone acetonide 0.025% (KENALOG) 0.025 % cream, Apply a thin layer to affected areas 1-2 times a day when having a flare up, Disp: 80 g, Rfl: 4    valsartan (DIOVAN) 80 MG tablet, Take 1 tablet (80 mg total) by mouth once daily., Disp: 90 tablet, Rfl: 3    vitamin D (VITAMIN D3) 1000 units Tab, Take 2,000 Units by mouth once daily. , Disp: , Rfl:   ALLERGIES:   Review of patient's allergies indicates:   Allergen Reactions    Levaquin [levofloxacin] Hives and Edema    Tramadol Nausea And Vomiting         Physical Exam     There were no vitals filed for this visit.  Alert and oriented to person, place and time. No acute distress. Well-groomed, not ill appearing. Pupils round and reactive, normal respiratory effort, no audible wheezing.     GENERAL:  A well-developed, well-nourished 73 y.o. female  who is alert and       oriented in no acute distress.      Gait: She  walks with a antalgic gait.                   EXTREMITIES:  Examination of lower extremities reveals there is no visible mass or deformity.    Left knee:  ROM     Ligamentously stable to varus/valgus stress.  Near fixed varus deformity 15°    Anterior and posterior drawers negative.    No pain over pes bursa.    No warmth    No erythema     Effusion Yes    medial, lateral joint line tenderness    Positive Patellofemoral grind/crepitus         The skin over both lower extremities is normal and unremarkable.  She has a  painless range of motion of the hips and ankles bilaterally.   Sensation is intact in both lower extremities.    There are no motor deficits in the lower extremities bilaterally.   Pedal pulses are palpable distally bilaterally.    She has no calf tenderness to palpation nor edema.       Imaging:       X-Ray: I have reviewed all pertinent results/findings and my personal findings are:  Left knee tricompartmental severe arthritis with varus deformity and obliteration of the joint space      Assessment & Plan    Primary osteoarthritis of left knee         Treatment options discussed with her in detail.  She has severe left knee arthritis that has failed conservative treatment.  She has near fixed varus deformity and severe limitation in her activities of daily living.  We discussed treatment options for her including total knee replacement.  We discussed the risks associated with surgery as well as the expectations of surgery.  She would like to proceed.    _________________________________________________________________      Diagnosis and findings were discussed with her in lay terms. I discussed the findings and treatment options with them at length. Questions were actively sought and all questions were answered to their apparent satisfaction. We discussed the risks and benefits of surgery. We reviewed the operative  indications surgical technique and potential rehabilitation involved. Risks including, but not limited to pain, bleeding, infection, damage to surrounding neurovascular structures, and other soft tissues, decreased range of motion, instability, poor cosmetic result, scarring/painful scars, poor functional result, reflex sympathetic dystrophy. We discussed as well the risk of anesthesia, DVT/PE and possible need for additional surgical intervention in lay terms. Despite the risks as explained to them, they wished to proceed with surgery. She expressed understanding and agreement with the treatment plan and understand that no guarantee of outcome is implied or expressed given.       Lizette Palafox will be scheduled for the following procedure(s):     Left total knee replacement  Image based navigation    Post-Op Medications to be prescribed:   Percocet 5/325mg Take 1-2 tablets every 4-6 hours PRN pain #28  Zofran 4mg oral disintegrating tablets every 8 hours PRN nausea/vomiting   ASA 81mg daily x 2 weeks  Motrin 600 mg TID PRN     DME/Bracing:  None  Post-op Physical Therapy to begin: immediately post-op    Medical Clearance: Yes  Hx of DVT,PE, anesthetic complications: No    Additional notes/concerns:  History of RA    Opioid Disclosure  Today we discussed the reasons why the prescription is necessary as well as: the risks of addiction and overdose associated with opioid drugs and the dangers of taking opioid drugs with alcohol, benzodiazepines and other central nervous system depressants; alternative treatments that may be available; the risks associated with the use of the drugs being prescribed, specifically that opioids are highly addictive, even when taken as prescribed, that there is a risk of developing a physical or psychological dependence on the controlled dangerous substance, and that the risks of taking more opioids than prescribed or mixing sedatives, benzodiazepines or alcohol with opioids can result in  fatal respiratory depression.

## 2023-08-07 ENCOUNTER — TELEPHONE (OUTPATIENT)
Dept: FAMILY MEDICINE | Facility: CLINIC | Age: 73
End: 2023-08-07

## 2023-08-17 ENCOUNTER — OFFICE VISIT (OUTPATIENT)
Dept: FAMILY MEDICINE | Facility: CLINIC | Age: 73
End: 2023-08-17
Payer: COMMERCIAL

## 2023-08-17 VITALS
DIASTOLIC BLOOD PRESSURE: 64 MMHG | BODY MASS INDEX: 30.21 KG/M2 | OXYGEN SATURATION: 98 % | TEMPERATURE: 99 F | HEART RATE: 77 BPM | SYSTOLIC BLOOD PRESSURE: 107 MMHG | WEIGHT: 160 LBS | HEIGHT: 61 IN

## 2023-08-17 DIAGNOSIS — M05.732 RHEUMATOID ARTHRITIS INVOLVING BOTH WRISTS WITH POSITIVE RHEUMATOID FACTOR: Primary | ICD-10-CM

## 2023-08-17 DIAGNOSIS — M05.731 RHEUMATOID ARTHRITIS INVOLVING BOTH WRISTS WITH POSITIVE RHEUMATOID FACTOR: Primary | ICD-10-CM

## 2023-08-17 PROCEDURE — 99214 PR OFFICE/OUTPT VISIT, EST, LEVL IV, 30-39 MIN: ICD-10-PCS | Mod: S$GLB,,, | Performed by: NURSE PRACTITIONER

## 2023-08-17 PROCEDURE — 1101F PT FALLS ASSESS-DOCD LE1/YR: CPT | Mod: CPTII,S$GLB,, | Performed by: NURSE PRACTITIONER

## 2023-08-17 PROCEDURE — 4010F PR ACE/ARB THEARPY RXD/TAKEN: ICD-10-PCS | Mod: CPTII,S$GLB,, | Performed by: NURSE PRACTITIONER

## 2023-08-17 PROCEDURE — 3074F PR MOST RECENT SYSTOLIC BLOOD PRESSURE < 130 MM HG: ICD-10-PCS | Mod: CPTII,S$GLB,, | Performed by: NURSE PRACTITIONER

## 2023-08-17 PROCEDURE — 4010F ACE/ARB THERAPY RXD/TAKEN: CPT | Mod: CPTII,S$GLB,, | Performed by: NURSE PRACTITIONER

## 2023-08-17 PROCEDURE — 3008F BODY MASS INDEX DOCD: CPT | Mod: CPTII,S$GLB,, | Performed by: NURSE PRACTITIONER

## 2023-08-17 PROCEDURE — 3074F SYST BP LT 130 MM HG: CPT | Mod: CPTII,S$GLB,, | Performed by: NURSE PRACTITIONER

## 2023-08-17 PROCEDURE — 1126F AMNT PAIN NOTED NONE PRSNT: CPT | Mod: CPTII,S$GLB,, | Performed by: NURSE PRACTITIONER

## 2023-08-17 PROCEDURE — 3008F PR BODY MASS INDEX (BMI) DOCUMENTED: ICD-10-PCS | Mod: CPTII,S$GLB,, | Performed by: NURSE PRACTITIONER

## 2023-08-17 PROCEDURE — 1160F PR REVIEW ALL MEDS BY PRESCRIBER/CLIN PHARMACIST DOCUMENTED: ICD-10-PCS | Mod: CPTII,S$GLB,, | Performed by: NURSE PRACTITIONER

## 2023-08-17 PROCEDURE — 1101F PR PT FALLS ASSESS DOC 0-1 FALLS W/OUT INJ PAST YR: ICD-10-PCS | Mod: CPTII,S$GLB,, | Performed by: NURSE PRACTITIONER

## 2023-08-17 PROCEDURE — 1160F RVW MEDS BY RX/DR IN RCRD: CPT | Mod: CPTII,S$GLB,, | Performed by: NURSE PRACTITIONER

## 2023-08-17 PROCEDURE — 3288F PR FALLS RISK ASSESSMENT DOCUMENTED: ICD-10-PCS | Mod: CPTII,S$GLB,, | Performed by: NURSE PRACTITIONER

## 2023-08-17 PROCEDURE — 99214 OFFICE O/P EST MOD 30 MIN: CPT | Mod: S$GLB,,, | Performed by: NURSE PRACTITIONER

## 2023-08-17 PROCEDURE — 1159F PR MEDICATION LIST DOCUMENTED IN MEDICAL RECORD: ICD-10-PCS | Mod: CPTII,S$GLB,, | Performed by: NURSE PRACTITIONER

## 2023-08-17 PROCEDURE — 3078F DIAST BP <80 MM HG: CPT | Mod: CPTII,S$GLB,, | Performed by: NURSE PRACTITIONER

## 2023-08-17 PROCEDURE — 1126F PR PAIN SEVERITY QUANTIFIED, NO PAIN PRESENT: ICD-10-PCS | Mod: CPTII,S$GLB,, | Performed by: NURSE PRACTITIONER

## 2023-08-17 PROCEDURE — 3288F FALL RISK ASSESSMENT DOCD: CPT | Mod: CPTII,S$GLB,, | Performed by: NURSE PRACTITIONER

## 2023-08-17 PROCEDURE — 1159F MED LIST DOCD IN RCRD: CPT | Mod: CPTII,S$GLB,, | Performed by: NURSE PRACTITIONER

## 2023-08-17 PROCEDURE — 3078F PR MOST RECENT DIASTOLIC BLOOD PRESSURE < 80 MM HG: ICD-10-PCS | Mod: CPTII,S$GLB,, | Performed by: NURSE PRACTITIONER

## 2023-08-17 RX ORDER — PREDNISONE 10 MG/1
TABLET ORAL
Qty: 18 TABLET | Refills: 0 | Status: SHIPPED | OUTPATIENT
Start: 2023-08-17 | End: 2023-08-26

## 2023-08-17 RX ORDER — DICLOFENAC SODIUM 10 MG/G
2 GEL TOPICAL 2 TIMES DAILY PRN
Qty: 150 G | Refills: 1 | Status: SHIPPED | OUTPATIENT
Start: 2023-08-17

## 2023-08-17 NOTE — PROGRESS NOTES
SUBJECTIVE:      Patient ID: Lizette Palafox is a 73 y.o. female.    Chief Complaint: Edema (Pt states she woke up one day a week ago and her hands started swelling with mild to little pain)    73-year-old female presents to the clinic as a new patient to me with complaints of bilateral hand swelling. She has a history of rheumatoid arthritis and gout. She takes methotrexate 20 mg every Friday and allopurinol 300 mg daily. Her rheumatologist retired and her RA is managed by her PCP.  She reports hand swelling and pain x1 week. She has pains to the joints of her fingers, hands, and wrist.  She reports the wrist pain is the worst. She reports compliance with methotrexate. Tramadol prn is not helping with the pain. She reports her typical gout flare occurs in the ankles. X-rays of right wrist and hands were completed in June, which showed arthritic changes. She has no hx of DVT. Denies injury or trauma to hands/wrist and falls.          Family History   Problem Relation Age of Onset    No Known Problems Mother     No Known Problems Father       Social History     Socioeconomic History    Marital status:    Tobacco Use    Smoking status: Never    Smokeless tobacco: Never   Substance and Sexual Activity    Alcohol use: Not Currently    Drug use: Never     Current Outpatient Medications   Medication Sig Dispense Refill    allopurinoL (ZYLOPRIM) 300 MG tablet Take 1 tablet (300 mg total) by mouth 2 (two) times daily. For gout prevention 180 tablet 1    aspirin 81 MG Chew Take 81 mg by mouth once daily.       carvediloL (COREG) 3.125 MG tablet TAKE 1 TABLET(3.125 MG) BY MOUTH TWICE DAILY WITH MEALS 180 tablet 3    ferrous sulfate (IRON ORAL) Take 1 tablet by mouth once daily.      folic acid (FOLVITE) 1 MG tablet Take 1 mg by mouth once daily.      gabapentin (NEURONTIN) 300 MG capsule Take 1 capsule (300 mg total) by mouth 2 (two) times daily. For nerve pain 180 capsule 1    ketoconazole (NIZORAL) 2 % cream  Apply topically once daily.      levothyroxine (SYNTHROID) 150 MCG tablet Take 1 tablet (150 mcg total) by mouth before breakfast. For thryoid 90 tablet 1    methotrexate 2.5 MG Tab Take 8 tablets (20 mg total) by mouth Every Friday. 32 tablet 5    omeprazole (PRILOSEC) 40 MG capsule Take 1 capsule (40 mg total) by mouth once daily. For heartburn 90 capsule 1    pravastatin (PRAVACHOL) 40 MG tablet Take 1 tablet (40 mg total) by mouth once daily. 90 tablet 3    traMADoL (ULTRAM) 50 mg tablet Take 1 tablet (50 mg total) by mouth every 6 (six) hours as needed for Pain. 90 tablet 2    triamcinolone acetonide 0.025% (KENALOG) 0.025 % cream Apply a thin layer to affected areas 1-2 times a day when having a flare up 80 g 4    valsartan (DIOVAN) 80 MG tablet Take 1 tablet (80 mg total) by mouth once daily. 90 tablet 3    vitamin D (VITAMIN D3) 1000 units Tab Take 2,000 Units by mouth once daily.       diclofenac sodium (VOLTAREN) 1 % Gel Apply 2 g topically 2 (two) times daily as needed (hand and wrist pain). 150 g 1    predniSONE (DELTASONE) 10 MG tablet Take 3 tablets (30 mg total) by mouth once daily for 3 days, THEN 2 tablets (20 mg total) once daily for 3 days, THEN 1 tablet (10 mg total) once daily for 3 days. 18 tablet 0     No current facility-administered medications for this visit.     Review of patient's allergies indicates:   Allergen Reactions    Levaquin [levofloxacin] Hives and Edema    Tramadol Nausea And Vomiting      Past Medical History:   Diagnosis Date    Anemia, unspecified 2021    Arthritis     Rheumatoid    Encounter for blood transfusion     GERD (gastroesophageal reflux disease)     Gout     Hypertension     Macrocytosis 2021    Personal history of colonic polyps 2012    Thyroid disease     Transfusion reaction      Past Surgical History:   Procedure Laterality Date    APPENDECTOMY       SECTION      x3    DILATION AND CURETTAGE OF UTERUS      GALLBLADDER SURGERY       "HYSTERECTOMY      TOTAL HIP ARTHROPLASTY         Review of Systems   Constitutional:  Negative for activity change, appetite change, chills, diaphoresis, fatigue, fever and unexpected weight change.   HENT:  Negative for congestion, ear pain, sinus pressure, sore throat, trouble swallowing and voice change.    Eyes:  Negative for pain, discharge and visual disturbance.   Respiratory:  Negative for cough, chest tightness, shortness of breath and wheezing.    Cardiovascular:  Negative for chest pain and palpitations.   Gastrointestinal:  Negative for abdominal pain, constipation, diarrhea, nausea and vomiting.   Genitourinary:  Negative for difficulty urinating, flank pain, frequency and urgency.   Musculoskeletal:  Positive for arthralgias and gait problem (ambulates with a walker). Negative for back pain, joint swelling and neck pain.        Finger, hand, and wrist pain/swelling   Skin:  Negative for color change and rash.   Neurological:  Negative for dizziness, seizures, syncope, weakness, numbness and headaches.   Hematological:  Negative for adenopathy.   Psychiatric/Behavioral:  Negative for dysphoric mood and sleep disturbance. The patient is not nervous/anxious.       OBJECTIVE:      Vitals:    08/17/23 1057   BP: 107/64   BP Location: Left arm   Patient Position: Sitting   BP Method: Medium (Automatic)   Pulse: 77   Temp: 99 °F (37.2 °C)   TempSrc: Oral   SpO2: 98%   Weight: 72.6 kg (160 lb)   Height: 5' 1" (1.549 m)     Physical Exam  Vitals and nursing note reviewed.   Constitutional:       General: She is awake. She is not in acute distress.     Appearance: Normal appearance. She is obese. She is not ill-appearing, toxic-appearing or diaphoretic.   HENT:      Head: Normocephalic and atraumatic.      Nose: Nose normal.   Eyes:      General: Lids are normal. Gaze aligned appropriately.      Conjunctiva/sclera: Conjunctivae normal.      Right eye: Right conjunctiva is not injected.      Left eye: Left " conjunctiva is not injected.      Pupils: Pupils are equal, round, and reactive to light.   Cardiovascular:      Rate and Rhythm: Normal rate and regular rhythm.      Pulses: Normal pulses.      Heart sounds: Normal heart sounds, S1 normal and S2 normal. No murmur heard.     No friction rub. No gallop.   Pulmonary:      Effort: Pulmonary effort is normal. No respiratory distress.      Breath sounds: Normal breath sounds. No stridor. No decreased breath sounds, wheezing, rhonchi or rales.   Chest:      Chest wall: No tenderness.   Musculoskeletal:      Right wrist: Swelling, tenderness and bony tenderness present. Decreased range of motion.      Left wrist: Swelling, tenderness and bony tenderness present. Decreased range of motion.      Right hand: Deformity, tenderness and bony tenderness present. No swelling. Decreased strength. Normal pulse.      Left hand: Deformity, tenderness and bony tenderness present. No swelling. Decreased strength. Normal pulse.      Cervical back: Neck supple.      Right lower leg: No edema.      Left lower leg: No edema.      Comments: Patient has tenderness to the DIP, PIP, MCP and wrist.   are 3/5. The joints are not hot.  She does have some Fabiana and Heberden's nodes. There is some swelling to the wrist, but no significant swelling to the hands and fingers.  The hands are warm to touch without erythema.    Lymphadenopathy:      Cervical: No cervical adenopathy.   Skin:     General: Skin is warm and dry.      Capillary Refill: Capillary refill takes less than 2 seconds.      Findings: No erythema or rash.   Neurological:      Mental Status: She is alert and oriented to person, place, and time. Mental status is at baseline.   Psychiatric:         Attention and Perception: Attention normal.         Mood and Affect: Mood normal.         Speech: Speech normal.         Behavior: Behavior normal. Behavior is cooperative.         Thought Content: Thought content normal.          Judgment: Judgment normal.        Assessment:       1. Rheumatoid arthritis involving both wrists with positive rheumatoid factor        Plan:       Rheumatoid arthritis involving both wrists with positive rheumatoid factor  She is likely have a rheumatoid arthritis flare.  Will treat with a taper dose of Prednisone.  Voltaren gel also provided for wrist and hands due the arthritis.  Continue Tramadol prn.   -     predniSONE (DELTASONE) 10 MG tablet; Take 3 tablets (30 mg total) by mouth once daily for 3 days, THEN 2 tablets (20 mg total) once daily for 3 days, THEN 1 tablet (10 mg total) once daily for 3 days.  Dispense: 18 tablet; Refill: 0  -     diclofenac sodium (VOLTAREN) 1 % Gel; Apply 2 g topically 2 (two) times daily as needed (hand and wrist pain).  Dispense: 150 g; Refill: 1    This note was created using [employer] voice recognition software that occasionally misinterprets phrases or words.     I spent a total of 22 minutes on the day of the visit.This includes face to face time and non-face to face time preparing to see the patient (eg, review of tests), obtaining and/or reviewing separately obtained history, documenting clinical information in the electronic or other health record, independently interpreting results and communicating results to the patient/family/caregiver, or care coordinator.    Follow up in about 1 week (around 8/24/2023) for Surgery Clearance.      8/17/2023 FANNY Sahu, DONNAP

## 2023-08-22 ENCOUNTER — TELEPHONE (OUTPATIENT)
Dept: ORTHOPEDICS | Facility: CLINIC | Age: 73
End: 2023-08-22
Payer: COMMERCIAL

## 2023-08-22 NOTE — TELEPHONE ENCOUNTER
----- Message from Berhane Dave sent at 8/22/2023 12:32 PM CDT -----  Regarding: checking on Sx date call pt   Contact: pt   checking on Sx date call pt

## 2023-08-22 NOTE — TELEPHONE ENCOUNTER
Pt advised to call once surgery clearance obtained so we can make sure pt cleared for surgery. Clearance scheduled 8/24/23

## 2023-08-24 ENCOUNTER — OFFICE VISIT (OUTPATIENT)
Dept: FAMILY MEDICINE | Facility: CLINIC | Age: 73
End: 2023-08-24
Payer: COMMERCIAL

## 2023-08-24 VITALS
WEIGHT: 165.5 LBS | RESPIRATION RATE: 18 BRPM | HEART RATE: 66 BPM | OXYGEN SATURATION: 98 % | TEMPERATURE: 99 F | DIASTOLIC BLOOD PRESSURE: 74 MMHG | BODY MASS INDEX: 31.25 KG/M2 | SYSTOLIC BLOOD PRESSURE: 122 MMHG | HEIGHT: 61 IN

## 2023-08-24 DIAGNOSIS — Z01.818 PREOPERATIVE CLEARANCE: Primary | ICD-10-CM

## 2023-08-24 DIAGNOSIS — M17.12 PRIMARY OSTEOARTHRITIS OF LEFT KNEE: ICD-10-CM

## 2023-08-24 PROCEDURE — 99214 PR OFFICE/OUTPT VISIT, EST, LEVL IV, 30-39 MIN: ICD-10-PCS | Mod: S$GLB,,, | Performed by: NURSE PRACTITIONER

## 2023-08-24 PROCEDURE — 1126F AMNT PAIN NOTED NONE PRSNT: CPT | Mod: CPTII,S$GLB,, | Performed by: NURSE PRACTITIONER

## 2023-08-24 PROCEDURE — 3078F PR MOST RECENT DIASTOLIC BLOOD PRESSURE < 80 MM HG: ICD-10-PCS | Mod: CPTII,S$GLB,, | Performed by: NURSE PRACTITIONER

## 2023-08-24 PROCEDURE — 1159F MED LIST DOCD IN RCRD: CPT | Mod: CPTII,S$GLB,, | Performed by: NURSE PRACTITIONER

## 2023-08-24 PROCEDURE — 3074F PR MOST RECENT SYSTOLIC BLOOD PRESSURE < 130 MM HG: ICD-10-PCS | Mod: CPTII,S$GLB,, | Performed by: NURSE PRACTITIONER

## 2023-08-24 PROCEDURE — 3074F SYST BP LT 130 MM HG: CPT | Mod: CPTII,S$GLB,, | Performed by: NURSE PRACTITIONER

## 2023-08-24 PROCEDURE — 1126F PR PAIN SEVERITY QUANTIFIED, NO PAIN PRESENT: ICD-10-PCS | Mod: CPTII,S$GLB,, | Performed by: NURSE PRACTITIONER

## 2023-08-24 PROCEDURE — 3008F PR BODY MASS INDEX (BMI) DOCUMENTED: ICD-10-PCS | Mod: CPTII,S$GLB,, | Performed by: NURSE PRACTITIONER

## 2023-08-24 PROCEDURE — 4010F PR ACE/ARB THEARPY RXD/TAKEN: ICD-10-PCS | Mod: CPTII,S$GLB,, | Performed by: NURSE PRACTITIONER

## 2023-08-24 PROCEDURE — 3008F BODY MASS INDEX DOCD: CPT | Mod: CPTII,S$GLB,, | Performed by: NURSE PRACTITIONER

## 2023-08-24 PROCEDURE — 99214 OFFICE O/P EST MOD 30 MIN: CPT | Mod: S$GLB,,, | Performed by: NURSE PRACTITIONER

## 2023-08-24 PROCEDURE — 3078F DIAST BP <80 MM HG: CPT | Mod: CPTII,S$GLB,, | Performed by: NURSE PRACTITIONER

## 2023-08-24 PROCEDURE — 4010F ACE/ARB THERAPY RXD/TAKEN: CPT | Mod: CPTII,S$GLB,, | Performed by: NURSE PRACTITIONER

## 2023-08-24 PROCEDURE — 1159F PR MEDICATION LIST DOCUMENTED IN MEDICAL RECORD: ICD-10-PCS | Mod: CPTII,S$GLB,, | Performed by: NURSE PRACTITIONER

## 2023-08-24 PROCEDURE — 1160F RVW MEDS BY RX/DR IN RCRD: CPT | Mod: CPTII,S$GLB,, | Performed by: NURSE PRACTITIONER

## 2023-08-24 PROCEDURE — 1160F PR REVIEW ALL MEDS BY PRESCRIBER/CLIN PHARMACIST DOCUMENTED: ICD-10-PCS | Mod: CPTII,S$GLB,, | Performed by: NURSE PRACTITIONER

## 2023-08-24 NOTE — LETTER
August 24, 2023      [facility]  [address]  [place] [address]  Phone: [phone]  Fax: [phone]               08/24/2023        Name: Lizette Palafox  YOB: 1950        To Whom It May Concern:       I saw your patient today for a surgical clearance consult.  It is my medical opinion based on her exam today that she is medically stable and cleared for a left total knee replacement.     If you have any questions or concerns, please don't hesitate to contact my office.       Sincerely,            FANNY Sahu, DONNAP-C

## 2023-08-24 NOTE — PROGRESS NOTES
SUBJECTIVE:      Patient ID: Lizette Palafox is a 73 y.o. female.    Chief Complaint: surgical clearance    73-year-old female presents to the clinic for surgery clearance. She is an established patient of Dr. Mccord.  She plans to have a left total knee replacement by Dr.Alexander Cota.  She has severe left knee arthritis. She is able to walk short distances with a walker.  She has had injections in the past that no longer are effective. PMH significant for anemia, rheumatoid arthritis, GERD, gout, hypertension, dyslipidemia, and thyroid disease. She is a nonsmoker. She's had prior surgeries including a hip replacement without complication and tolerated anesthesia well.  Anemia is stable per labs 2 months ago, she is on ferrous sulfate.  She does have a history of a transfusion reaction, so she needs to be monitored if blood products are given.  She is able to perform her ADLs and perform light and moderate house work without symptoms.  She does not have a history of history of heart disease, diabetes, chronic kidney disease, or peripheral artery disease. Blood pressure is stable. Denies angina, dyspnea, syncope, and palpitations.        Family History   Problem Relation Age of Onset    No Known Problems Mother     No Known Problems Father       Social History     Socioeconomic History    Marital status:    Tobacco Use    Smoking status: Never    Smokeless tobacco: Never   Substance and Sexual Activity    Alcohol use: Not Currently    Drug use: Never     Current Outpatient Medications   Medication Sig Dispense Refill    allopurinoL (ZYLOPRIM) 300 MG tablet Take 1 tablet (300 mg total) by mouth 2 (two) times daily. For gout prevention 180 tablet 1    aspirin 81 MG Chew Take 81 mg by mouth once daily.       carvediloL (COREG) 3.125 MG tablet TAKE 1 TABLET(3.125 MG) BY MOUTH TWICE DAILY WITH MEALS 180 tablet 3    diclofenac sodium (VOLTAREN) 1 % Gel Apply 2 g topically 2 (two) times daily as needed  (hand and wrist pain). 150 g 1    ferrous sulfate (IRON ORAL) Take 1 tablet by mouth once daily.      folic acid (FOLVITE) 1 MG tablet Take 1 mg by mouth once daily.      gabapentin (NEURONTIN) 300 MG capsule Take 1 capsule (300 mg total) by mouth 2 (two) times daily. For nerve pain 180 capsule 1    ketoconazole (NIZORAL) 2 % cream Apply topically once daily.      levothyroxine (SYNTHROID) 150 MCG tablet Take 1 tablet (150 mcg total) by mouth before breakfast. For thryoid 90 tablet 1    methotrexate 2.5 MG Tab Take 8 tablets (20 mg total) by mouth Every Friday. 32 tablet 5    omeprazole (PRILOSEC) 40 MG capsule Take 1 capsule (40 mg total) by mouth once daily. For heartburn 90 capsule 1    pravastatin (PRAVACHOL) 40 MG tablet Take 1 tablet (40 mg total) by mouth once daily. 90 tablet 3    predniSONE (DELTASONE) 10 MG tablet Take 3 tablets (30 mg total) by mouth once daily for 3 days, THEN 2 tablets (20 mg total) once daily for 3 days, THEN 1 tablet (10 mg total) once daily for 3 days. 18 tablet 0    traMADoL (ULTRAM) 50 mg tablet Take 1 tablet (50 mg total) by mouth every 6 (six) hours as needed for Pain. 90 tablet 2    triamcinolone acetonide 0.025% (KENALOG) 0.025 % cream Apply a thin layer to affected areas 1-2 times a day when having a flare up 80 g 4    valsartan (DIOVAN) 80 MG tablet Take 1 tablet (80 mg total) by mouth once daily. 90 tablet 3    vitamin D (VITAMIN D3) 1000 units Tab Take 2,000 Units by mouth once daily.        No current facility-administered medications for this visit.     Review of patient's allergies indicates:   Allergen Reactions    Levaquin [levofloxacin] Hives and Edema    Tramadol Nausea And Vomiting      Past Medical History:   Diagnosis Date    Anemia, unspecified 05/19/2021    Arthritis     Rheumatoid    Encounter for blood transfusion     GERD (gastroesophageal reflux disease)     Gout     Hypertension     Macrocytosis 05/19/2021    Personal history of colonic polyps 06/05/2012     "Thyroid disease     Transfusion reaction      Past Surgical History:   Procedure Laterality Date    APPENDECTOMY       SECTION      x3    DILATION AND CURETTAGE OF UTERUS      GALLBLADDER SURGERY      HYSTERECTOMY      TOTAL HIP ARTHROPLASTY         Review of Systems   Constitutional:  Negative for activity change, appetite change, chills, diaphoresis, fatigue, fever and unexpected weight change.   HENT:  Negative for congestion, ear pain, sinus pressure, sore throat, trouble swallowing and voice change.    Eyes:  Negative for pain, discharge and visual disturbance.   Respiratory:  Negative for cough, chest tightness, shortness of breath and wheezing.    Cardiovascular:  Negative for chest pain and palpitations.        Hypertension and dyslipidemia    Gastrointestinal:  Negative for abdominal pain, constipation, diarrhea, nausea and vomiting.   Endocrine:        Hypothyroidism    Genitourinary:  Negative for difficulty urinating, flank pain, frequency and urgency.   Musculoskeletal:  Positive for arthralgias and gait problem (ambulates with a walker). Negative for back pain, joint swelling and neck pain.        Finger, hand, and wrist pain, hx of RA   Skin:  Negative for color change and rash.   Neurological:  Negative for dizziness, seizures, syncope, weakness, numbness and headaches.   Hematological:  Negative for adenopathy.   Psychiatric/Behavioral:  Negative for dysphoric mood and sleep disturbance. The patient is not nervous/anxious.       OBJECTIVE:      Vitals:    23 1402   BP: 122/74   Pulse: 66   Resp: 18   Temp: 98.5 °F (36.9 °C)   SpO2: 98%   Weight: 75.1 kg (165 lb 8 oz)   Height: 5' 1" (1.549 m)     Physical Exam  Vitals and nursing note reviewed.   Constitutional:       General: She is awake. She is not in acute distress.     Appearance: Normal appearance. She is obese. She is not ill-appearing, toxic-appearing or diaphoretic.   HENT:      Head: Normocephalic and atraumatic.      Nose: " Nose normal.      Mouth/Throat:      Lips: Pink.      Mouth: Mucous membranes are moist.      Dentition: No dental tenderness.      Pharynx: Oropharynx is clear. Uvula midline. No oropharyngeal exudate or posterior oropharyngeal erythema.      Comments: Mallampati score class 2.  Eyes:      General: Lids are normal. Gaze aligned appropriately.      Conjunctiva/sclera: Conjunctivae normal.      Right eye: Right conjunctiva is not injected.      Left eye: Left conjunctiva is not injected.      Pupils: Pupils are equal, round, and reactive to light.   Cardiovascular:      Rate and Rhythm: Normal rate and regular rhythm.      Pulses: Normal pulses.      Heart sounds: Normal heart sounds, S1 normal and S2 normal. No murmur heard.     No friction rub. No gallop.   Pulmonary:      Effort: Pulmonary effort is normal. No respiratory distress.      Breath sounds: Normal breath sounds. No stridor. No decreased breath sounds, wheezing, rhonchi or rales.   Chest:      Chest wall: No tenderness.   Abdominal:      Palpations: Abdomen is soft.      Tenderness: There is no abdominal tenderness.   Musculoskeletal:      Cervical back: Neck supple.      Left knee: Bony tenderness and crepitus present.      Right lower leg: No edema.      Left lower leg: No edema.   Lymphadenopathy:      Cervical: No cervical adenopathy.   Skin:     General: Skin is warm and dry.      Capillary Refill: Capillary refill takes less than 2 seconds.      Findings: No erythema or rash.   Neurological:      Mental Status: She is alert and oriented to person, place, and time. Mental status is at baseline.      Comments: Ambulates with walker   Psychiatric:         Attention and Perception: Attention normal.         Mood and Affect: Mood normal.         Speech: Speech normal.         Behavior: Behavior normal. Behavior is cooperative.         Thought Content: Thought content normal.         Judgment: Judgment normal.        Assessment:       1. Preoperative  clearance    2. Primary osteoarthritis of left knee        Plan:       Preoperative clearance  She has no know cardiopulmonary issues. Blood pressure is controlled.  Patient is able to complete greater than 4 METS of exercise without symptoms.  Patient is medically cleared for left total knee replacement. Patient informed to hold the ASA 81 mg 10 days prior to surgery.     Primary osteoarthritis of left knee  Cleared for surgery by Dr. Cota.    This note was created using [employer] voice recognition software that occasionally misinterprets phrases or words.     I spent a total of 32 minutes on the day of the visit.This includes face to face time and non-face to face time preparing to see the patient (eg, review of tests), obtaining and/or reviewing separately obtained history, documenting clinical information in the electronic or other health record, independently interpreting results and communicating results to the patient/family/caregiver, or care coordinator.    Follow up if symptoms worsen or fail to improve.      8/24/2023 FANNY Sahu, FNP

## 2023-08-25 ENCOUNTER — TELEPHONE (OUTPATIENT)
Dept: ORTHOPEDICS | Facility: CLINIC | Age: 73
End: 2023-08-25
Payer: COMMERCIAL

## 2023-08-25 DIAGNOSIS — M17.12 PRIMARY OSTEOARTHRITIS OF LEFT KNEE: Primary | ICD-10-CM

## 2023-08-25 NOTE — TELEPHONE ENCOUNTER
----- Message from Cornelio Cota MD sent at 8/25/2023  1:48 PM CDT -----    ----- Message -----  From: Daron Reynolds NP  Sent: 8/24/2023   5:06 PM CDT  To: Cornelio Cota MD    Patient is cleared for left total knee replacement.

## 2023-08-29 ENCOUNTER — TELEPHONE (OUTPATIENT)
Dept: ORTHOPEDICS | Facility: CLINIC | Age: 73
End: 2023-08-29
Payer: COMMERCIAL

## 2023-08-29 NOTE — TELEPHONE ENCOUNTER
----- Message from Berhane Dave sent at 8/29/2023 12:37 PM CDT -----  Regarding: pre Sx question, call chai Wright   Contact: chai Nettles5 [phone]  pre Sx question, call chai Wright

## 2023-08-29 NOTE — TELEPHONE ENCOUNTER
They will be dropping off paperwork clearances or they will be faxed. She is looking to be placed on the schedule for surgery. Rommel Torres will call her after 09/05/2023

## 2023-09-05 ENCOUNTER — TELEPHONE (OUTPATIENT)
Dept: FAMILY MEDICINE | Facility: CLINIC | Age: 73
End: 2023-09-05

## 2023-09-05 NOTE — TELEPHONE ENCOUNTER
----- Message from Smita Ruiz sent at 9/5/2023  2:15 PM CDT -----  Pt needs something called in for pain   [phone]  [facility]

## 2023-09-05 NOTE — TELEPHONE ENCOUNTER
Pt has been having flair ups from rheumatoid arthritis. States pt has been seeing [facility].. advised to call their office since pt has now seen them twice. See if something can be called in. Daughter voiced understanding.

## 2023-09-07 DIAGNOSIS — Z01.818 PRE-OP EXAM: ICD-10-CM

## 2023-09-07 DIAGNOSIS — M17.12 PRIMARY OSTEOARTHRITIS OF LEFT KNEE: Primary | ICD-10-CM

## 2023-09-07 RX ORDER — MUPIROCIN 20 MG/G
OINTMENT TOPICAL
Status: CANCELLED | OUTPATIENT
Start: 2023-09-07

## 2023-09-19 ENCOUNTER — TELEPHONE (OUTPATIENT)
Dept: ORTHOPEDICS | Facility: CLINIC | Age: 73
End: 2023-09-19
Payer: COMMERCIAL

## 2023-09-19 NOTE — TELEPHONE ENCOUNTER
----- Message from Berhane Dave sent at 9/19/2023 12:34 PM CDT -----  Regarding: Pre op issue with ins, call chai Wright   Contact: chai Nettles5 [phone]  Pre op issue with ins, call chai Wright

## 2023-09-20 ENCOUNTER — TELEPHONE (OUTPATIENT)
Dept: FAMILY MEDICINE | Facility: CLINIC | Age: 73
End: 2023-09-20

## 2023-09-20 DIAGNOSIS — Z78.0 ASYMPTOMATIC MENOPAUSAL STATE: ICD-10-CM

## 2023-09-20 NOTE — TELEPHONE ENCOUNTER
----- Message from Pili Flannery sent at 9/20/2023  2:28 PM CDT -----  Patient daughter Adriana called and stated that the patient is having a lot of pain and she is stiff and she would like to know if she can come in and be seen ? Please give her a call at [phone]

## 2023-09-25 ENCOUNTER — TELEPHONE (OUTPATIENT)
Dept: FAMILY MEDICINE | Facility: CLINIC | Age: 73
End: 2023-09-25

## 2023-09-25 ENCOUNTER — HOSPITAL ENCOUNTER (EMERGENCY)
Facility: HOSPITAL | Age: 73
Discharge: HOME | End: 2023-09-25
Attending: EMERGENCY MEDICINE
Payer: COMMERCIAL

## 2023-09-25 VITALS
HEIGHT: 61 IN | RESPIRATION RATE: 17 BRPM | BODY MASS INDEX: 30.21 KG/M2 | SYSTOLIC BLOOD PRESSURE: 128 MMHG | WEIGHT: 160 LBS | TEMPERATURE: 99 F | HEART RATE: 62 BPM | OXYGEN SATURATION: 96 % | DIASTOLIC BLOOD PRESSURE: 64 MMHG

## 2023-09-25 DIAGNOSIS — M06.9 RHEUMATOID ARTHRITIS FLARE: Primary | ICD-10-CM

## 2023-09-25 PROCEDURE — 99283 EMERGENCY DEPT VISIT LOW MDM: CPT

## 2023-09-25 RX ORDER — HYDROCODONE BITARTRATE AND ACETAMINOPHEN 5; 325 MG/1; MG/1
1 TABLET ORAL EVERY 6 HOURS PRN
Qty: 10 TABLET | Refills: 0 | Status: SHIPPED | OUTPATIENT
Start: 2023-09-25 | End: 2023-11-14

## 2023-09-25 NOTE — TELEPHONE ENCOUNTER
Spoke with patient daughter, patient was very weak today due to arthritis flare up. Currently at ED. Patient due to have knee arthroplasty in Oct. Daughter will call to set appt at future time for follow up.

## 2023-09-25 NOTE — ED PROVIDER NOTES
Encounter Date: 2023       History     Chief Complaint   Patient presents with    Joint Pain     X 1 week, history of R.A. currently off of steroids pending knee surgery     73-year-old female presents to the emergency department with complaint of joint pain patient has a history of rheumatoid arthritis she used to see the [facility]  physicians however they retired.  The patient has been off of her steroids for approximately 30 days because she is going to have a knee surgery soon.  She states that her rheumatoid exacerbated approximately 3 days ago.  Patient has had no associated fevers, denies any trauma      Review of patient's allergies indicates:   Allergen Reactions    Levaquin [levofloxacin] Hives and Edema    Tramadol Nausea And Vomiting     Past Medical History:   Diagnosis Date    Anemia, unspecified 2021    Arthritis     Rheumatoid    Encounter for blood transfusion     GERD (gastroesophageal reflux disease)     Gout     Hypertension     Macrocytosis 2021    Personal history of colonic polyps 2012    Thyroid disease     Transfusion reaction      Past Surgical History:   Procedure Laterality Date    APPENDECTOMY       SECTION      x3    DILATION AND CURETTAGE OF UTERUS      GALLBLADDER SURGERY      HYSTERECTOMY      TOTAL HIP ARTHROPLASTY       Family History   Problem Relation Age of Onset    No Known Problems Mother     No Known Problems Father      Social History     Tobacco Use    Smoking status: Never    Smokeless tobacco: Never   Substance Use Topics    Alcohol use: Not Currently    Drug use: Never     Review of Systems   Constitutional: Negative.  Negative for fever.   HENT: Negative.     Respiratory: Negative.     Cardiovascular: Negative.    Gastrointestinal: Negative.    Genitourinary: Negative.    Musculoskeletal:  Positive for arthralgias and joint swelling. Negative for back pain, gait problem, myalgias, neck pain and neck stiffness.   Skin: Negative.     Psychiatric/Behavioral: Negative.     All other systems reviewed and are negative.      Physical Exam     Initial Vitals [09/25/23 1642]   BP Pulse Resp Temp SpO2   129/68 62 18 98.6 °F (37 °C) 96 %      MAP       --         Physical Exam    Nursing note and vitals reviewed.  Constitutional: She appears well-developed and well-nourished.   HENT:   Head: Normocephalic and atraumatic.   Eyes: EOM are normal. Pupils are equal, round, and reactive to light.   Cardiovascular:  Normal rate, regular rhythm and normal heart sounds.           Pulmonary/Chest: Breath sounds normal.   Musculoskeletal:      Comments: Bilateral wrist pain, bilateral hand pain, shoulder pain patient complains of pain in multiple joints there is no warmth or erythema over any of the joints     Neurological: She is alert and oriented to person, place, and time.         ED Course   Procedures  Labs Reviewed - No data to display       Imaging Results    None          Medications - No data to display  Medical Decision Making  73-year-old female presents to the emergency department with complaint of joint pain patient has a history of rheumatoid arthritis she used to see the [facility]  physicians however they retired.  The patient has been off of her steroids for approximately 30 days because she is going to have a knee surgery soon.  She states that her rheumatoid exacerbated approximately 3 days ago.  Patient has had no associated fevers, denies any trauma  Considerations include rheumatoid flare, cellulitis  73-year-old female presents to the emergency department with complaint of multiple joint pains and aches.  Patient does have a history of rheumatoid arthritis currently can not take her steroids which usually controls her pain secondary to having a upcoming knee surgery.  Patient has had no fevers she has no warmth or erythema over joints nothing suggestive of cellulitis or infection.  Patient is currently taking tramadol only for pain however  her daughter states that she does not tolerate it very well had patient has pain medication will be changed to Norco she was instructed not to take this medication on an empty stomach or alone, or while taking gabapentin.  I will give the patient a short course of pain medications and I have given her several rheumatology physicians that she can follow-up with.    Amount and/or Complexity of Data Reviewed  Independent Historian: caregiver                               Clinical Impression:   Final diagnoses:  [M06.9] Rheumatoid arthritis flare (Primary)        ED Disposition Condition    Discharge Stable          ED Prescriptions       Medication Sig Dispense Start Date End Date Auth. Provider    HYDROcodone-acetaminophen (NORCO) 5-325 mg per tablet Take 1 tablet by mouth every 6 (six) hours as needed for Pain. 10 tablet 9/25/2023 -- Niesha Nance FNP          Follow-up Information       Follow up With Specialties Details Why Contact Info    Claudia Ibarra MD Rheumatology Schedule an appointment as soon as possible for a visit in 2 days  [address] [facility]  [address]  [facility] [address]  [phone]      Eva Mina MD Rheumatology Schedule an appointment as soon as possible for a visit in 2 days  [address] [facility] [address]  [phone]               Niesha Nance FNP  09/25/23 7980

## 2023-09-26 DIAGNOSIS — M17.12 PRIMARY OSTEOARTHRITIS OF LEFT KNEE: Primary | ICD-10-CM

## 2023-09-27 ENCOUNTER — TELEPHONE (OUTPATIENT)
Dept: ORTHOPEDICS | Facility: CLINIC | Age: 73
End: 2023-09-27
Payer: COMMERCIAL

## 2023-09-27 ENCOUNTER — HOSPITAL ENCOUNTER (OUTPATIENT)
Dept: RADIOLOGY | Facility: HOSPITAL | Age: 73
Discharge: HOME | End: 2023-09-27
Attending: ORTHOPAEDIC SURGERY
Payer: COMMERCIAL

## 2023-09-27 ENCOUNTER — HOSPITAL ENCOUNTER (OUTPATIENT)
Dept: PREADMISSION TESTING | Facility: HOSPITAL | Age: 73
Discharge: HOME | End: 2023-09-27
Attending: ORTHOPAEDIC SURGERY
Payer: COMMERCIAL

## 2023-09-27 DIAGNOSIS — M17.12 PRIMARY OSTEOARTHRITIS OF LEFT KNEE: ICD-10-CM

## 2023-09-27 DIAGNOSIS — M17.12 PRIMARY OSTEOARTHRITIS OF LEFT KNEE: Primary | ICD-10-CM

## 2023-09-27 DIAGNOSIS — Z01.818 PREOP TESTING: ICD-10-CM

## 2023-09-27 DIAGNOSIS — Z01.818 PRE-OP EXAM: ICD-10-CM

## 2023-09-27 PROCEDURE — 73700 CT KNEE WITHOUT CONTRAST LEFT W/MAKO PROTOCOL: ICD-10-PCS | Mod: 26,LT,, | Performed by: RADIOLOGY

## 2023-09-27 PROCEDURE — 73700 CT LOWER EXTREMITY W/O DYE: CPT | Mod: 26,LT,, | Performed by: RADIOLOGY

## 2023-09-27 PROCEDURE — 73700 CT LOWER EXTREMITY W/O DYE: CPT | Mod: TC,LT

## 2023-09-27 NOTE — DISCHARGE INSTRUCTIONS
To confirm, Your doctor has instructed you that surgery is scheduled for: 10/5/23 with DR. Cota    Please report to [facility], Registration the morning of surgery. You must check-in and receive a wristband before going to your procedure.  [address] [place] [address] [place] [address]    Pre-Op will call the afternoon prior to surgery between 1:00 and 6:00 PM with the final arrival time.  Phone number: [phone]    PLEASE NOTE:  The surgery schedule has many variables which may affect the time of your surgery case.  Family members should be available if your surgery time changes.  Plan to be here the day of your procedure between 4-6 hours.    MEDICATIONS:  TAKE ONLY THESE MEDICATIONS WITH A SMALL SIP OF WATER THE MORNING OF YOUR PROCEDURE:  ALLOPURINOL, GABAPENTIN, LEVOTHYROXINE, OMEPRAZOLE, CARVEDILOL, HYDROCODONE IF NEEDED.    NO VALSARTAN MORNING OF SURGERY BUT DO NOT STOP DAYS BEFORE.      DO NOT TAKE THESE MEDICATIONS 5-7 DAYS PRIOR to your procedure or per your surgeon's request:   ASPIRIN, ALEVE, ADVIL, IBUPROFEN, FISH OIL VITAMIN E, HERBALS  (May take Tylenol)    ONLY if you are prescribed any types of blood thinners such as:  Aspirin, Coumadin, Plavix, Pradaxa, Xarelto, Aggrenox, Effient, Eliquis, Savasya, Brilinta, or any other, ask your surgeon whether you should stop taking them and how long before surgery you should stop.  You may also need to verify with the prescribing physician if it is ok to stop your medication.      INSTRUCTIONS IMPORTANT!!  Do not eat or drink anything between midnight and the time of your procedure- this includes gum, mints, and candy.  Do not smoke or drink alcoholic beverages 24 hours prior to your procedure.  Shower the night before AND the morning of your procedure with a Chlorhexidine wash such as Hibiclens or Dial antibacterial soap from the neck down.  Do not get it on your face or in your eyes.  You may use your own shampoo and face wash. This helps  your skin to be as bacteria free as possible.    If you wear contact lenses, dentures, hearing aids or glasses, bring a container to put them in during surgery and give to a family member for safe keeping.  Please leave all jewelry, piercing's and valuables at home. You must remove your false eyelashes prior to surgery.    DO NOT remove hair from the surgery site.  Do not shave the incision site unless you are given specific instructions to do so.    ONLY if you have been diagnosed with sleep apnea please bring your C-PAP machine.  ONLY if you wear home oxygen please bring your portable oxygen tank the day of your procedure.  ONLY if you have a history of OPEN HEART SURGERY you will need a clearance from your Cardiologist per Anesthesia.      ONLY for patients requiring bowel prep, written instructions will be given by your doctor's office.  ONLY if you have a neuro stimulator, please bring the controller with you the morning of surgery  ONLY if a type and screen test is needed before surgery, please return:  If your doctor has scheduled you for an overnight stay, bring a small overnight bag with any personal items you need.  Make arrangements in advance for transportation home by a responsible adult.  It is not safe to drive a vehicle during the 24 hours after anesthesia.          All  facilities and properties are tobacco free.  Smoking is NOT allowed.   If you have any questions about these instructions, call Pre-Op Admit  Nursing at [phone] or the Pre-Op Day Surgery Unit at [phone].

## 2023-09-27 NOTE — PRE ADMISSION SCREENING
JOINT CAMP ASSESSMENT    Name Lizette Palafox   MRN [id number]    Age/Sex 73 y.o. female    Surgeon Dr. Cornelio Cota   Joint Camp Date 2023   Surgery Date 10/5/2023   Procedure Left Knee Arthroplasty   Insurance Payor: [employer] MEDICARE ADVANTAGE / Plan: [employer] DUAL ADVANTAGE / Product Type: Medicare Advantage /    Care Team Patient Care Team:  Josh Mccord MD as PCP - General (Family Medicine)  Tito Love MD as Consulting Physician (Orthopedic Surgery)  Surendra Corona MD (Inactive) as Consulting Physician (Rheumatology)    Pharmacy   [id number] - [place], [place] - [address] [facility] [address]  [address]  [place] [address]  Phone: [phone] Fax: [phone]     AM-PAC Score   21   Risk Assessment Score 6     Past Medical History:   Diagnosis Date    Anemia, unspecified 2021    Arthritis     Rheumatoid    Encounter for blood transfusion     GERD (gastroesophageal reflux disease)     Gout     Hypertension     Macrocytosis 2021    Personal history of colonic polyps 2012    Thyroid disease     Transfusion reaction        Past Surgical History:   Procedure Laterality Date    APPENDECTOMY       SECTION      x3    DILATION AND CURETTAGE OF UTERUS      GALLBLADDER SURGERY      HYSTERECTOMY      TOTAL HIP ARTHROPLASTY           Home Enviroment     Living Arrangement: Lives with family  Home Environment: 1-story house/ trailer, number of outside stairs: 3 with a railing, walk-in shower  Home Safety Concerns: unremarkable    DISCHARGE CAREGIVER/SUPPORT SYSTEM     Identified post-op caregiver: Patient has children / family / friends to help, daughter.  Patient's caregiver(s) will be able to provide physical assistance. Patient will have someone to assist overnight.      Caregiver present at pre-op interview:  Yes      PRE-OPERATIVE FUNCTIONAL STATUS     Employment: Retired    Pre-op Functional Status: Patient  is independent with mobility/ambulation, transfers, ADL's, IADL's.    Use of assistive device for ambulation: rollator  ADL: self care  ADL Limitations: difficulty with walking  Medical Restrictions: Unstable ambulation and Decreased range of motions in extremities    POTENTIAL BARRIERS TO DISCHARGE/POTENTIAL POST-OP COMPLICATIONS     Patient with hx of anemia, previous blood transfusion with transfusion reaction, gout, HTN. POSSIBLE SAME DAY DISCHARGE.    DISCHARGE PLAN     Expected LOS of 1 days or less for joint replacement discussed with patient. We also discussed a discharge path of HH for approximately two weeks with a transition to outpatient PT on the third week given no post-op complications.      Patient in agreement with discharge plan: Yes    Discharge to: Discharge home with home health (PT/OT) x2 weeks with transition to outpatient PT     HH:  [facility] ([place]). Patient disclosure form completed and sent to case management for upload to the medical record.      OP PT: [facility] ([facility] D[facility])     Home DME: rollator    Needed DME at D/C: rolling walker and bedside commode, Patient to purchase RW from retail prior to surgery.     Rx: Per Dr. Cota at discharge     Meds to Beds: Yes  Patient expected to discharge on Aspirin 81mg by mouth twice daily for DVT prophylaxis.

## 2023-09-27 NOTE — PRE ADMISSION SCREENING
Patient Name: Lizette Palafox  YOB: 1950   MRN: [id number]     [facility]   Basic Mobility Inpatient Short Form 6 Clicks         How much difficulty does the patient currently have  Unable  A Lot  A Little  None      1. Turning over in bed (including adjusting bedclothes, sheets and blankets)?     1 []    2 []    3 [x]    4 []        2. Sitting down on and standing up from a chair with arms (e.g., wheelchair, bedside commode, etc.)     1 []  2 []  3 [x]     4 []      3. Moving from lying on back to sitting on the side of the bed?     1 []  2 []  3 [x]    4 []    How much help from another person does the patient currently need  Total  A Lot  A Little  None      4. Moving to and from a bed to a chair (including a wheelchair)?    1 []  2 []  3 []    4 [x]      5. Need to walk in hospital room?    1 []  2 []  3 []    4 [x]      6. Climbing 3-5 steps with a railing?    1 []  2 []  3 []    4 [x]       Raw Score:      21            CMS 0-100% Score:     28.97       %   Standardized Score:    50.25           CMS Modifier:        CJ                                  [facility]   Basic [facility]ility [facility] Short Form 6 Clicks Score Conversion Table*         *Use this form to convert -PAC Basic Mobility Inpatient Raw Scores.   [facility] Inpatient Basic Mobility Short Form Scoring Example   1. Add the number values associated with the response to each item. For example, items totals yield a Raw Score of 21.   2. Match the raw score to the t-Scale scores (t-Scale score = 50.25, SE = 4.69).   3. Find the associated CMS % (CMS % = 28.97%).   4. Locate the correct CMS Functional Modifier Code, or G Code (G code = CJ)     NOTE: Each -PAC Short Form has a separate conversion table. Make sure that you use the correct conversion table.       Instruction Manual - page 45 contains conversion table

## 2023-09-27 NOTE — TELEPHONE ENCOUNTER
Pt daughter stating having issues getting mother to joint camp appt. Advised daughter either they go or we will need to cancel surgery . Daughter will attempt to get mother in for joint camp.

## 2023-09-27 NOTE — PRE ADMISSION SCREENING
"               [facility] Risk Assessment Scale    Patient Name: Lizette Palafox  YOB: 1950  MRN: [id number]            RIsk Factor Measure Recommendation Patient Data Scale/Score   BMI >40 Reconsider surgery, weight loss   Estimated body mass index is 30.23 kg/m² as calculated from the following:    Height as of 9/25/23: 5' 1" (1.549 m).    Weight as of 9/25/23: 72.6 kg (160 lb).   [] 0 = 1 - 24.9  [] 1 = 25-29.9  [x] 2 = 30-34.9  [] 3 = 35-39.9  [] 4 = 40-44.9  [] 5 = 45-99.9   Hemoglobin AIC (if applicable) >9 Delay surgery until DM under control  Refer for:  Nutrition Therapy  Exercise   Medication    Lab Results   Component Value Date    HGBA1C 5.1 11/18/2020       Lab Results   Component Value Date     09/27/2023      [x] 0 = 4.0-5.6  [] 1 = 5.7-6.4  [] 2 = 6.5-6.9  [] 3 = 7.0-7.9  [] 4 = 8.0-8.9  [] 5 = 9.0-12   Hemoglobin (Anemia) <9 Delay surgery   Correct anemia Lab Results   Component Value Date    HGB 10.1 (L) 09/27/2023    [] 20 - <9.0                    Albumin <3 Delay surgery &Workup Lab Results   Component Value Date    ALBUMIN 3.9 06/13/2023    [] 20 - <3.0   Smoking Cessation >4 Weeks Delay Surgery  Refer to OP Cessation Class    Never Smoker [] 20 - current smoker                                _____ PPD                    Hx of MI, PE, Arrhythmia, CVA, DVT <30 Days Delay Surgery    N/A [] 20      Infection Variable Delay surgery and re-evaluate   N/A [] 20 - recent/current infection     Depression (PHQ) >10 out of 27 Delay Surgery and re-evaluate  Medication  Counseling              [x] 0     []1     []2     []3      []4      [] 5                    (1-4)      (5-9)  (10-14)  (15-19)   (20-27)     Memory Impairment & Memory loss (Mini-Cog Screening Tool) Advanced dementia and/or Parkinson's Reconsider surgery     [x] 0     []1     []2     []3     []4     [] 5     Physical Conditioning (Modified AM-PAC Per Physical Therapy at [facility]) Unable to ambulate on day of surgery Delay " surgery and re-evaluate  Pre-Rehabilitation   (PT evaluation)       []  0   [x]4       []8     []12        []16     []20       (<20%)   (<40%)   (<60%)   (<80% )    (>80%)     Home Environment/Caregiver support  (Per /Navigator Interview)    Availability of basic services and/or approprate assistance during post-operative period Delay surgery and re-evaluate  Safe home environment  Health   1 week post-surgery  Transportation  availability  Ability to obtain DME/Medications post-op    [x] 0     []1     []2     []3     []4     [] 5  [x] 0     []1     []2     []3     []4     [] 5  [x] 0     []1     []2     []3     []4     [] 5  [x] 0     []1     []2     []3     []4     [] 5         MD Contact: Dr. Cota Comments:  Total Score:  6

## 2023-09-27 NOTE — TELEPHONE ENCOUNTER
----- Message from Shreya Chew MA sent at 9/27/2023  9:32 AM CDT -----  Contact: Adriana daughter  Pre op appt   Off meds and may not be able to move   Call back

## 2023-10-03 ENCOUNTER — TELEPHONE (OUTPATIENT)
Dept: ORTHOPEDICS | Facility: CLINIC | Age: 73
End: 2023-10-03
Payer: COMMERCIAL

## 2023-10-03 ENCOUNTER — HOSPITAL ENCOUNTER (OUTPATIENT)
Dept: PREADMISSION TESTING | Facility: HOSPITAL | Age: 73
Discharge: HOME | End: 2023-10-03
Attending: ORTHOPAEDIC SURGERY
Payer: COMMERCIAL

## 2023-10-03 DIAGNOSIS — Z01.818 PREOP TESTING: ICD-10-CM

## 2023-10-03 NOTE — TELEPHONE ENCOUNTER
Returned patient call. Informed Daughter, Adriana that DME will be delivered to her at the bedside by case management on Thursday 10/05/2023 once her mother is out of surgery.

## 2023-10-04 ENCOUNTER — ANESTHESIA EVENT (OUTPATIENT)
Dept: SURGERY | Facility: HOSPITAL | Age: 73
End: 2023-10-04
Payer: COMMERCIAL

## 2023-10-04 RX ORDER — IBUPROFEN 600 MG/1
600 TABLET ORAL 3 TIMES DAILY
Qty: 90 TABLET | Refills: 0 | Status: SHIPPED | OUTPATIENT
Start: 2023-10-04

## 2023-10-04 RX ORDER — OXYCODONE AND ACETAMINOPHEN 5; 325 MG/1; MG/1
1 TABLET ORAL EVERY 6 HOURS PRN
Qty: 28 TABLET | Refills: 0 | Status: SHIPPED | OUTPATIENT
Start: 2023-10-04 | End: 2023-10-17 | Stop reason: SDUPTHER

## 2023-10-04 RX ORDER — ONDANSETRON 4 MG/1
4 TABLET, ORALLY DISINTEGRATING ORAL EVERY 8 HOURS PRN
Qty: 30 TABLET | Refills: 0 | Status: SHIPPED | OUTPATIENT
Start: 2023-10-04

## 2023-10-04 RX ORDER — CYCLOBENZAPRINE HCL 10 MG
10 TABLET ORAL 3 TIMES DAILY PRN
Qty: 30 TABLET | Refills: 0 | Status: SHIPPED | OUTPATIENT
Start: 2023-10-04 | End: 2023-10-14

## 2023-10-04 RX ORDER — ASPIRIN 81 MG/1
81 TABLET ORAL DAILY
Qty: 60 TABLET | Refills: 0 | Status: SHIPPED | OUTPATIENT
Start: 2023-10-04 | End: 2024-10-03

## 2023-10-04 RX ORDER — DOCUSATE SODIUM 100 MG/1
100 CAPSULE, LIQUID FILLED ORAL 2 TIMES DAILY PRN
Qty: 45 CAPSULE | Refills: 0 | Status: SHIPPED | OUTPATIENT
Start: 2023-10-04

## 2023-10-05 ENCOUNTER — HOSPITAL ENCOUNTER (OUTPATIENT)
Facility: HOSPITAL | Age: 73
Discharge: HOME | End: 2023-10-05
Attending: ORTHOPAEDIC SURGERY | Admitting: ORTHOPAEDIC SURGERY
Payer: COMMERCIAL

## 2023-10-05 ENCOUNTER — ANESTHESIA (OUTPATIENT)
Dept: SURGERY | Facility: HOSPITAL | Age: 73
End: 2023-10-05
Payer: COMMERCIAL

## 2023-10-05 DIAGNOSIS — M17.12 PRIMARY OSTEOARTHRITIS OF LEFT KNEE: ICD-10-CM

## 2023-10-05 DIAGNOSIS — Z01.818 PREOP TESTING: Primary | ICD-10-CM

## 2023-10-05 DIAGNOSIS — Z01.818 PRE-OP EXAM: ICD-10-CM

## 2023-10-05 PROCEDURE — D9220A PRA ANESTHESIA: Mod: ANES,,, | Performed by: ANESTHESIOLOGY

## 2023-10-05 PROCEDURE — 63600175 PHARM REV CODE 636 W HCPCS: Performed by: NURSE ANESTHETIST, CERTIFIED REGISTERED

## 2023-10-05 PROCEDURE — 97161 PT EVAL LOW COMPLEX 20 MIN: CPT

## 2023-10-05 PROCEDURE — C9290 INJ, BUPIVACAINE LIPOSOME: HCPCS | Performed by: ANESTHESIOLOGY

## 2023-10-05 PROCEDURE — 97116 GAIT TRAINING THERAPY: CPT

## 2023-10-05 PROCEDURE — 71000039 HC RECOVERY, EACH ADD'L HOUR: Performed by: ORTHOPAEDIC SURGERY

## 2023-10-05 PROCEDURE — D9220A PRA ANESTHESIA: Mod: CRNA,,, | Performed by: NURSE ANESTHETIST, CERTIFIED REGISTERED

## 2023-10-05 PROCEDURE — 27201423 OPTIME MED/SURG SUP & DEVICES STERILE SUPPLY: Performed by: ORTHOPAEDIC SURGERY

## 2023-10-05 PROCEDURE — 27200688 HC TRAY, SPINAL-HYPER/ ISOBARIC: Performed by: ANESTHESIOLOGY

## 2023-10-05 PROCEDURE — C1713 ANCHOR/SCREW BN/BN,TIS/BN: HCPCS | Performed by: ORTHOPAEDIC SURGERY

## 2023-10-05 PROCEDURE — 27447 TOTAL KNEE ARTHROPLASTY: CPT | Mod: LT,,, | Performed by: ORTHOPAEDIC SURGERY

## 2023-10-05 PROCEDURE — 64447 NJX AA&/STRD FEMORAL NRV IMG: CPT | Mod: 59 | Performed by: ANESTHESIOLOGY

## 2023-10-05 PROCEDURE — 94799 UNLISTED PULMONARY SVC/PX: CPT

## 2023-10-05 PROCEDURE — 63600175 PHARM REV CODE 636 W HCPCS

## 2023-10-05 PROCEDURE — 36000713 HC OR TIME LEV V EA ADD 15 MIN: Performed by: ORTHOPAEDIC SURGERY

## 2023-10-05 PROCEDURE — D9220A PRA ANESTHESIA: ICD-10-PCS | Mod: CRNA,,, | Performed by: NURSE ANESTHETIST, CERTIFIED REGISTERED

## 2023-10-05 PROCEDURE — 36000712 HC OR TIME LEV V 1ST 15 MIN: Performed by: ORTHOPAEDIC SURGERY

## 2023-10-05 PROCEDURE — 63600175 PHARM REV CODE 636 W HCPCS: Performed by: ANESTHESIOLOGY

## 2023-10-05 PROCEDURE — 25000003 PHARM REV CODE 250: Performed by: ORTHOPAEDIC SURGERY

## 2023-10-05 PROCEDURE — C1776 JOINT DEVICE (IMPLANTABLE): HCPCS | Performed by: ORTHOPAEDIC SURGERY

## 2023-10-05 PROCEDURE — 0055T BONE SRGRY CMPTR CT/MRI IMAG: CPT | Mod: ,,, | Performed by: ORTHOPAEDIC SURGERY

## 2023-10-05 PROCEDURE — 27200665 HC NERVE BLOCK NEEDLE/ CATHETER: Performed by: ANESTHESIOLOGY

## 2023-10-05 PROCEDURE — 25000003 PHARM REV CODE 250: Performed by: ANESTHESIOLOGY

## 2023-10-05 PROCEDURE — 97110 THERAPEUTIC EXERCISES: CPT

## 2023-10-05 PROCEDURE — 25000003 PHARM REV CODE 250: Performed by: NURSE ANESTHETIST, CERTIFIED REGISTERED

## 2023-10-05 PROCEDURE — 63600175 PHARM REV CODE 636 W HCPCS: Performed by: ORTHOPAEDIC SURGERY

## 2023-10-05 PROCEDURE — 27447 PR TOTAL KNEE ARTHROPLASTY: ICD-10-PCS | Mod: LT,,, | Performed by: ORTHOPAEDIC SURGERY

## 2023-10-05 PROCEDURE — 64447: ICD-10-PCS | Mod: 59,LT,, | Performed by: ANESTHESIOLOGY

## 2023-10-05 PROCEDURE — 0055T PR COMPUTER-ASSIST MUSCSKEL NAVIG, ORTHO PROC, CT/MRI: ICD-10-PCS | Mod: ,,, | Performed by: ORTHOPAEDIC SURGERY

## 2023-10-05 PROCEDURE — 71000016 HC POSTOP RECOV ADDL HR: Performed by: ORTHOPAEDIC SURGERY

## 2023-10-05 PROCEDURE — 37000009 HC ANESTHESIA EA ADD 15 MINS: Performed by: ORTHOPAEDIC SURGERY

## 2023-10-05 PROCEDURE — D9220A PRA ANESTHESIA: ICD-10-PCS | Mod: ANES,,, | Performed by: ANESTHESIOLOGY

## 2023-10-05 PROCEDURE — 71000033 HC RECOVERY, INTIAL HOUR: Performed by: ORTHOPAEDIC SURGERY

## 2023-10-05 PROCEDURE — 37000008 HC ANESTHESIA 1ST 15 MINUTES: Performed by: ORTHOPAEDIC SURGERY

## 2023-10-05 PROCEDURE — 64447 NJX AA&/STRD FEMORAL NRV IMG: CPT | Mod: 59,LT,, | Performed by: ANESTHESIOLOGY

## 2023-10-05 PROCEDURE — 71000015 HC POSTOP RECOV 1ST HR: Performed by: ORTHOPAEDIC SURGERY

## 2023-10-05 DEVICE — CEMENT BONE SIMPLEX HV RADPQ: Type: IMPLANTABLE DEVICE | Site: KNEE | Status: FUNCTIONAL

## 2023-10-05 DEVICE — SYMMETRIC PATELLA
Type: IMPLANTABLE DEVICE | Site: KNEE | Status: FUNCTIONAL
Brand: TRIATHLON

## 2023-10-05 DEVICE — TIBIAL COMPONENT
Type: IMPLANTABLE DEVICE | Site: KNEE | Status: FUNCTIONAL
Brand: TRIATHLON

## 2023-10-05 DEVICE — CRUCIATE RETAINING FEMORAL
Type: IMPLANTABLE DEVICE | Site: KNEE | Status: FUNCTIONAL
Brand: TRIATHLON

## 2023-10-05 RX ORDER — BUPIVACAINE HYDROCHLORIDE 7.5 MG/ML
INJECTION, SOLUTION EPIDURAL; RETROBULBAR
Status: DISCONTINUED | OUTPATIENT
Start: 2023-10-05 | End: 2023-10-05

## 2023-10-05 RX ORDER — LIDOCAINE HYDROCHLORIDE 20 MG/ML
INJECTION INTRAVENOUS
Status: DISCONTINUED | OUTPATIENT
Start: 2023-10-05 | End: 2023-10-05

## 2023-10-05 RX ORDER — PHENYLEPHRINE HYDROCHLORIDE 10 MG/ML
INJECTION INTRAVENOUS
Status: DISCONTINUED | OUTPATIENT
Start: 2023-10-05 | End: 2023-10-05

## 2023-10-05 RX ORDER — OXYCODONE HYDROCHLORIDE 5 MG/1
5 TABLET ORAL
Status: ACTIVE | OUTPATIENT
Start: 2023-10-05

## 2023-10-05 RX ORDER — BUPIVACAINE HYDROCHLORIDE 5 MG/ML
INJECTION, SOLUTION EPIDURAL; INTRACAUDAL
Status: DISCONTINUED | OUTPATIENT
Start: 2023-10-05 | End: 2023-10-05

## 2023-10-05 RX ORDER — PREGABALIN 75 MG/1
75 CAPSULE ORAL ONCE
Status: COMPLETED | OUTPATIENT
Start: 2023-10-05 | End: 2023-10-05

## 2023-10-05 RX ORDER — FENTANYL CITRATE 50 UG/ML
INJECTION, SOLUTION INTRAMUSCULAR; INTRAVENOUS
Status: DISCONTINUED | OUTPATIENT
Start: 2023-10-05 | End: 2023-10-05

## 2023-10-05 RX ORDER — OXYCODONE HCL 10 MG/1
10 TABLET, FILM COATED, EXTENDED RELEASE ORAL ONCE
Status: COMPLETED | OUTPATIENT
Start: 2023-10-05 | End: 2023-10-05

## 2023-10-05 RX ORDER — MUPIROCIN 20 MG/G
OINTMENT TOPICAL
Status: DISCONTINUED | OUTPATIENT
Start: 2023-10-05 | End: 2023-10-05 | Stop reason: HOSPADM

## 2023-10-05 RX ORDER — LIDOCAINE HYDROCHLORIDE 10 MG/ML
1 INJECTION, SOLUTION EPIDURAL; INFILTRATION; INTRACAUDAL; PERINEURAL ONCE
Status: ACTIVE | OUTPATIENT
Start: 2023-10-05

## 2023-10-05 RX ORDER — HYDROCODONE BITARTRATE AND ACETAMINOPHEN 5; 325 MG/1; MG/1
1 TABLET ORAL EVERY 4 HOURS PRN
Status: CANCELLED | OUTPATIENT
Start: 2023-10-05

## 2023-10-05 RX ORDER — ACETAMINOPHEN 325 MG/1
650 TABLET ORAL EVERY 4 HOURS PRN
Status: CANCELLED | OUTPATIENT
Start: 2023-10-05

## 2023-10-05 RX ORDER — PROPOFOL 10 MG/ML
VIAL (ML) INTRAVENOUS CONTINUOUS PRN
Status: DISCONTINUED | OUTPATIENT
Start: 2023-10-05 | End: 2023-10-05

## 2023-10-05 RX ORDER — FENTANYL CITRATE 50 UG/ML
25 INJECTION, SOLUTION INTRAMUSCULAR; INTRAVENOUS EVERY 5 MIN PRN
Status: ACTIVE | OUTPATIENT
Start: 2023-10-05

## 2023-10-05 RX ORDER — CEFAZOLIN SODIUM 2 G/50ML
2 SOLUTION INTRAVENOUS
Status: COMPLETED | OUTPATIENT
Start: 2023-10-05 | End: 2023-10-05

## 2023-10-05 RX ORDER — OXYCODONE HYDROCHLORIDE 10 MG/1
10 TABLET ORAL EVERY 4 HOURS PRN
Status: CANCELLED | OUTPATIENT
Start: 2023-10-05

## 2023-10-05 RX ORDER — SODIUM CHLORIDE 0.9 % (FLUSH) 0.9 %
10 SYRINGE (ML) INJECTION
Status: DISCONTINUED | OUTPATIENT
Start: 2023-10-05 | End: 2023-10-05 | Stop reason: HOSPADM

## 2023-10-05 RX ORDER — PROPOFOL 10 MG/ML
VIAL (ML) INTRAVENOUS
Status: DISCONTINUED | OUTPATIENT
Start: 2023-10-05 | End: 2023-10-05

## 2023-10-05 RX ORDER — ACETAMINOPHEN 10 MG/ML
INJECTION, SOLUTION INTRAVENOUS
Status: DISCONTINUED | OUTPATIENT
Start: 2023-10-05 | End: 2023-10-05

## 2023-10-05 RX ORDER — ONDANSETRON 4 MG/1
8 TABLET, ORALLY DISINTEGRATING ORAL EVERY 8 HOURS PRN
Status: CANCELLED | OUTPATIENT
Start: 2023-10-05

## 2023-10-05 RX ORDER — METOCLOPRAMIDE HYDROCHLORIDE 5 MG/ML
10 INJECTION INTRAMUSCULAR; INTRAVENOUS EVERY 10 MIN PRN
Status: ACTIVE | OUTPATIENT
Start: 2023-10-05

## 2023-10-05 RX ORDER — ONDANSETRON 2 MG/ML
INJECTION INTRAMUSCULAR; INTRAVENOUS
Status: DISCONTINUED | OUTPATIENT
Start: 2023-10-05 | End: 2023-10-05

## 2023-10-05 RX ORDER — CELECOXIB 100 MG/1
400 CAPSULE ORAL ONCE
Status: COMPLETED | OUTPATIENT
Start: 2023-10-05 | End: 2023-10-05

## 2023-10-05 RX ORDER — MIDAZOLAM HYDROCHLORIDE 1 MG/ML
INJECTION, SOLUTION INTRAMUSCULAR; INTRAVENOUS
Status: DISCONTINUED | OUTPATIENT
Start: 2023-10-05 | End: 2023-10-05

## 2023-10-05 RX ORDER — TRANEXAMIC ACID 100 MG/ML
INJECTION, SOLUTION INTRAVENOUS
Status: DISCONTINUED | OUTPATIENT
Start: 2023-10-05 | End: 2023-10-05

## 2023-10-05 RX ADMIN — FENTANYL CITRATE 100 MCG: 50 INJECTION, SOLUTION INTRAMUSCULAR; INTRAVENOUS at 09:10

## 2023-10-05 RX ADMIN — MIDAZOLAM 1 MG: 1 INJECTION INTRAMUSCULAR; INTRAVENOUS at 10:10

## 2023-10-05 RX ADMIN — SODIUM CHLORIDE: 0.9 INJECTION, SOLUTION INTRAVENOUS at 09:10

## 2023-10-05 RX ADMIN — CEFAZOLIN SODIUM 2 G: 2 SOLUTION INTRAVENOUS at 10:10

## 2023-10-05 RX ADMIN — MUPIROCIN: 20 OINTMENT TOPICAL at 08:10

## 2023-10-05 RX ADMIN — BUPIVACAINE HYDROCHLORIDE 10 ML: 5 INJECTION, SOLUTION EPIDURAL; INTRACAUDAL; PERINEURAL at 09:10

## 2023-10-05 RX ADMIN — OXYCODONE HYDROCHLORIDE 10 MG: 10 TABLET, FILM COATED, EXTENDED RELEASE ORAL at 08:10

## 2023-10-05 RX ADMIN — TRANEXAMIC ACID 1000 MG: 100 INJECTION, SOLUTION INTRAVENOUS at 10:10

## 2023-10-05 RX ADMIN — PHENYLEPHRINE HYDROCHLORIDE 200 MCG: 10 INJECTION INTRAVENOUS at 10:10

## 2023-10-05 RX ADMIN — ACETAMINOPHEN 1000 MG: 10 INJECTION, SOLUTION INTRAVENOUS at 10:10

## 2023-10-05 RX ADMIN — BUPIVACAINE 20 ML: 13.3 INJECTION, SUSPENSION, LIPOSOMAL INFILTRATION at 09:10

## 2023-10-05 RX ADMIN — PHENYLEPHRINE HYDROCHLORIDE 0.3 MCG/KG/MIN: 10 INJECTION INTRAVENOUS at 10:10

## 2023-10-05 RX ADMIN — SODIUM CHLORIDE 1000 ML: 9 INJECTION, SOLUTION INTRAVENOUS at 09:10

## 2023-10-05 RX ADMIN — ONDANSETRON 4 MG: 2 INJECTION INTRAMUSCULAR; INTRAVENOUS at 10:10

## 2023-10-05 RX ADMIN — BUPIVACAINE HYDROCHLORIDE 1.5 ML: 7.5 INJECTION, SOLUTION EPIDURAL; RETROBULBAR at 10:10

## 2023-10-05 RX ADMIN — PROPOFOL 35 MCG/KG/MIN: 10 INJECTION, EMULSION INTRAVENOUS at 10:10

## 2023-10-05 RX ADMIN — SODIUM CHLORIDE, SODIUM GLUCONATE, SODIUM ACETATE, POTASSIUM CHLORIDE AND MAGNESIUM CHLORIDE: 526; 502; 368; 37; 30 INJECTION, SOLUTION INTRAVENOUS at 08:10

## 2023-10-05 RX ADMIN — GLYCOPYRROLATE 0.2 MG: 0.2 INJECTION, SOLUTION INTRAMUSCULAR; INTRAVITREAL at 10:10

## 2023-10-05 RX ADMIN — LIDOCAINE HYDROCHLORIDE 50 MG: 20 INJECTION, SOLUTION INTRAVENOUS at 10:10

## 2023-10-05 RX ADMIN — MIDAZOLAM 1 MG: 1 INJECTION INTRAMUSCULAR; INTRAVENOUS at 11:10

## 2023-10-05 RX ADMIN — CELECOXIB 200 MG: 100 CAPSULE ORAL at 08:10

## 2023-10-05 RX ADMIN — PROPOFOL 75 MG: 10 INJECTION, EMULSION INTRAVENOUS at 10:10

## 2023-10-05 RX ADMIN — PHENYLEPHRINE HYDROCHLORIDE 100 MCG: 10 INJECTION INTRAVENOUS at 10:10

## 2023-10-05 RX ADMIN — PREGABALIN 75 MG: 75 CAPSULE ORAL at 08:10

## 2023-10-05 NOTE — PLAN OF CARE
Patient prepared for surgery. Surgical and anesthesia consents signed. Patient belongings in preop. Text message notifications set up with daughter. Call light within reach, bed in lowest position.

## 2023-10-05 NOTE — PLAN OF CARE
[facility] received a message from HOLLAND Resendiz in Day Surgery that pt was ready for her BSC.    Per Analisa Mccann with [facility], Okay to pull BSC.  [facility] delivered BSC to pt at bedside and returned paperwork to first floor DME closet

## 2023-10-05 NOTE — PLAN OF CARE
"Pt awake, alert, oriented. Vital signs stable. Pt reports she is "ready to go". Cleared for discharge by PT. Pt reports having all necessary DME. Discharge instructions reviewed with pt and pt's daughter. Pt and pt's daughter verbalized understanding. IV removed, catheter intact. Dressing to L knee clean, dry, and intact. Jason VLADISLAV hose on. All belongings returned to patient. Pt transferred to car via wheelchair. Safety maintained.    "

## 2023-10-05 NOTE — DISCHARGE INSTRUCTIONS
"Discharge Instructions: After Your Surgery/Procedure  Youve just had surgery. During surgery you were given medicine called anesthesia to keep you relaxed and free of pain. After surgery you may have some pain or nausea. This is common. Here are some tips for feeling better and getting well after surgery.     Stay on schedule with your medication.   Going home  Your doctor or nurse will show you how to take care of yourself when you go home. He or she will also answer your questions. Have an adult family member or friend drive you home.      For your safety we recommend these precaution for the first 24 hours after your procedure:  Do not drive or use heavy equipment.  Do not make important decisions or sign legal papers.  Do not drink alcohol.  Have someone stay with you, if needed. He or she can watch for problems and help keep you safe.  Your concentration, balance, coordination, and judgement may be impaired for many hours after anesthesia.  Use caution when ambulating or standing up.     You may feel weak and "washed out" after anesthesia and surgery.      Subtle residual effects of general anesthesia or sedation with regional / local anesthesia can last more than 24 hours.  Rest for the remainder of the day or longer if your Doctor/Surgeon has advised you to do so.  Although you may feel normal within the first 24 hours, your reflexes and mental ability may be impaired without you realizing it.  You may feel dizzy, lightheaded or sleepy for 24 hours or longer.      Be sure to go to all follow-up visits with your doctor. And rest after your surgery for as long as your doctor tells you to.  Coping with pain  If you have pain after surgery, pain medicine will help you feel better. Take it as told, before pain becomes severe. Also, ask your doctor or pharmacist about other ways to control pain. This might be with heat, ice, or relaxation. And follow any other instructions your surgeon or nurse gives you.  Tips " for taking pain medicine  To get the best relief possible, remember these points:  Pain medicines can upset your stomach. Taking them with a little food may help.  Most pain relievers taken by mouth need at least 20 to 30 minutes to start to work.  Taking medicine on a schedule can help you remember to take it. Try to time your medicine so that you can take it before starting an activity. This might be before you get dressed, go for a walk, or sit down for dinner.  Constipation is a common side effect of pain medicines. Call your doctor before taking any medicines such as laxatives or stool softeners to help ease constipation. Also ask if you should skip any foods. Drinking lots of fluids and eating foods such as fruits and vegetables that are high in fiber can also help. Remember, do not take laxatives unless your surgeon has prescribed them.  Drinking alcohol and taking pain medicine can cause dizziness and slow your breathing. It can even be deadly. Do not drink alcohol while taking pain medicine.  Pain medicine can make you react more slowly to things. Do not drive or run machinery while taking pain medicine.  Your health care provider may tell you to take acetaminophen to help ease your pain. Ask him or her how much you are supposed to take each day. Acetaminophen or other pain relievers may interact with your prescription medicines or other over-the-counter (OTC) drugs. Some prescription medicines have acetaminophen and other ingredients. Using both prescription and OTC acetaminophen for pain can cause you to overdose. Read the labels on your OTC medicines with care. This will help you to clearly know the list of ingredients, how much to take, and any warnings. It may also help you not take too much acetaminophen. If you have questions or do not understand the information, ask your pharmacist or health care provider to explain it to you before you take the OTC medicine.  Managing nausea  Some people have an  upset stomach after surgery. This is often because of anesthesia, pain, or pain medicine, or the stress of surgery. These tips will help you handle nausea and eat healthy foods as you get better. If you were on a special food plan before surgery, ask your doctor if you should follow it while you get better. These tips may help:  Do not push yourself to eat. Your body will tell you when to eat and how much.  Start off with clear liquids and soup. They are easier to digest.  Next try semi-solid foods, such as mashed potatoes, applesauce, and gelatin, as you feel ready.  Slowly move to solid foods. Dont eat fatty, rich, or spicy foods at first.  Do not force yourself to have 3 large meals a day. Instead eat smaller amounts more often.  Take pain medicines with a small amount of solid food, such as crackers or toast, to avoid nausea.     Call your surgeon if  You still have pain an hour after taking medicine. The medicine may not be strong enough.  You feel too sleepy, dizzy, or groggy. The medicine may be too strong.  You have side effects like nausea, vomiting, or skin changes, such as rash, itching, or hives.       If you have obstructive sleep apnea  You were given anesthesia medicine during surgery to keep you comfortable and free of pain. After surgery, you may have more apnea spells because of this medicine and other medicines you were given. The spells may last longer than usual.   At home:  Keep using the continuous positive airway pressure (CPAP) device when you sleep. Unless your health care provider tells you not to, use it when you sleep, day or night. CPAP is a common device used to treat obstructive sleep apnea.  Talk with your provider before taking any pain medicine, muscle relaxants, or sedatives. Your provider will tell you about the possible dangers of taking these medicines.  © 5923-8818 [employer]. [address] [place] [address], [place] [address]. All rights reserved. This information is  not intended as a substitute for professional medical care. Always follow your healthcare professional's instructions.           Using an Incentive Spirometer    An incentive spirometer is a device that helps you do deep breathing exercises. These exercises expand your lungs, aid in circulation, and help prevent pneumonia. Deep breathing exercises also help you breathe better and improve the function of your lungs by:  Keeping your lungs clear  Strengthening your breathing muscles  Helping prevent respiratory complications or problems  The incentive spirometer gives you a way to take an active part in recover. A nurse or therapist will teach you breathing exercises. To do these exercises, you will breathe in through your mouth and not your nose. The incentive spirometer only works correctly if you breathe in through your mouth.  Steps to clear lungs  Step 1. Exhale normally. Then, inhale normally.  Relax and breathe out.  Step 2. Place your lips tightly around the mouthpiece.  Make sure the device is upright and not tilted.  Step 3. Inhale as much air as you can through the mouthpiece (don't breath through your nose).  Inhale slowly and deeply.  Hold your breath long enough to keep the balls or disk raised for at least 3 to 5 seconds, or as instructed by your healthcare provider.  Some spirometers have an indicator to let you know that you are breathing in too fast. If the indicator goes off, breathe in more slowly.  Step 4. Repeat the exercise regularly.  Do this exercise every hour while you're awake, or as instructed by your healthcare provider.  If you were taught deep breathing and coughing exercises, do them regularly as instructed by your healthcare provider.            Post op instructions for prevention of DVT  What is deep vein thrombosis?  Deep vein thrombosis (DVT) is the medical term for blood clots in the deep veins of the leg.  These blood clots can be dangerous.  A DVT can block a blood vessel and  keep blood from getting where it needs to go.  Another problem is that the clot can travel to other parts of the body such as the lungs.  A clot that travels to the lungs is called a pulmonary embolus (PE) and can cause serious problems with breathing which can lead to death.  Am I at risk for DVT/PE?  If you are not very active, you are at risk of DVT.  Anyone confined to bed, sitting for long periods of time, recovering from surgery, etc. increases the risk of DVT.  Other risk factors are cancer diagnosis, certain medications, estrogen replacement in any form,older age, obesity, pregnancy, smoking, history of clotting disorders, and dehydration.  How will I know if I have a DVT?  Swelling in the lower leg  Pain  Warmth, redness, hardness or bulging of the vein  If you have any of these symptoms, call your doctors office right away.  Some people will not have any symptoms until the clot moves to the lungs.  What are the symptoms of a PE?  Panting, shortness of breath, or trouble breathing  Sharp, knife-like chest pain when you breathe  Coughing or coughing up blood  Rapid heartbeat  If you have any of these symptoms or get worse quickly, call 911 for emergency treatment.  How can I prevent a DVT?  Avoid long periods of inactivity and dont cross your legs--get up and walk around every hour or so.  Stay active--walking after surgery is highly encouraged.  This means you should get out of the house and walk in the neighborhood.  Going up and down stairs will not impair healing (unless advised against such activity by your doctor).    Drink plenty of noncaffeinated, nonalcoholic fluids each day to prevent dehydration.  Wear special support stockings, if they have been advised by your doctor.  If you travel, stop at least once an hour and walk around.  Avoid smoking (assistance with stopping is available through your healthcare provider)  Always notify your doctor if you are not able to follow the post operative  instructions that are given to you at the time of discharge.  It may be necessary to prescribe one of the medications available to prevent DVT.           Exparel(bupivacaine) has been injected to provide approximately 72 hours of reduced pain after your surgery.  Do not remove the bracelet for five days.  Report to your doctor as soon as possible if you experience any of the following:   Restlessness   Anxiety   Speech problems    Lightheadedness   Numbness and tingling of the mouth and lips   Seizures    Metallic taste   Blurred vision   Tremors    Twitching   Depression   Extreme drowsiness  Avoid additional use of local anesthetics (such as dental procedures) for five days (96 hours).           We hope your stay was comfortable as you heal now, mend and rest.    For we have enjoyed taking care of you by giving your our best.    And as you get better, by regaining your health and strength;   We count it as a privilege to have served you and hope your time at [facility] was well spent.      Thank  You!!!

## 2023-10-05 NOTE — PROGRESS NOTES
Criteria per anesthesia for transfer to post op . Tolerating po fluids Denies pain  Transferred per stretcher to phase 2 Report given to Astrid

## 2023-10-05 NOTE — PROGRESS NOTES
Dr Long made aware heart rate 40's SBP MAP 70-80's alert on room air Dermatone progressing able to move toes though numb Ok to go to post op

## 2023-10-05 NOTE — OP NOTE
DATE OF PROCEDURE: 10/05/2023     PREOPERATIVE DIAGNOSIS: Osteoarthritis Left knee.     POSTOPERATIVE DIAGNOSIS: Osteoarthritis Left knee.     PROCEDURE: 1) Left total knee replacement (CPT #90735)   2) Computer assisted surgical navigation (CPT #0055T)    SURGEON: Cornelio Cota MD    ASSISTANT: April Huynh CFA    They were presents and scrubbed for the entire procedure and instrumental in patient positioning, retraction, insertion of implants and closure. Without them I would have been unable to safely perform the case due to only one scrub tech available.     ANESTHESIA: spinal/regional     ESTIMATED BLOOD LOSS: 50 mL.     COMPLICATIONS: None    SPECIMEN: Bone and soft tissue.    IMPLANTS:     Implant Name Type Inv. Item Serial No.  Lot No. LRB No. Used Action   PIN BONE 4 X 140MM STERILE - [id number]  PIN BONE 4 X 140MM STERILE  RODY [employer]GICAL [id number] Left 2 Implanted and Explanted   PIN BONE [id number] - [id number]  PIN BONE [id number]  BENJIEYUPPTV SINGH. [id number] Left 1 Implanted and Explanted   CEMENT BONE SIMPLEX HV RADPQ - [id number]  CEMENT BONE SIMPLEX HV RADPQ  BENJIE Siperian SINGH. [id number] Left 1 Implanted   BASEPLATE TIBIAL TRIATHLON SZ - [id number]  BASEPLATE TIBIAL TRIATHLON SZ  BENJIE Siperian SINGH. [id number] Left 1 Implanted   TRIATHLON CRUCIATE RETAINING FEMORAL LEFT SIZE 3      [id number] Left 1 Implanted   TRIATHLON X3 TIBIAL BEARING INSERT CS SIZE 4       [id number] Left 1 Implanted   PATELLA TRI 29X8 X3 POLYETHYLE - [id number]  PATELLA TRI 29X8 X3 POLYETHYLE  BENJIE Siperian SINGH. [id number] Left 1 Implanted           INDICATIONS: The patient is a 73 y.o. female  with a longstanding history of left knee pain. Radiographs revealed advanced arthritis of the left knee. She had failed extensive conservative treatment at this point. Treatment options were discussed and the patient elected to proceed with total knee replacement with computer navigation/robotic assistance. Risks and complications were  discussed including, but not limited to the risks of anesthetic complications, infections, wound healing complications, DVT, pulmonary embolism, death, continued pain, stiffness and need for further surgery among others and She elected to proceed. Perioperative medical risks were also discussed.     COMPONENTS USED: [employer]ley Triathlon Press-fit size 3 Left CR Press-fit femoral component, 4,   12  mm CR tibial insert, 29x8 mm patellar component.     DESCRIPTION OF PROCEDURE: The patient was taken to the Operating Room where spinal  anesthesia was administered by the Anesthesia Department. She was then placed in the supine position. All superficial neurovascular structures were well padded. The left lower extremity was then sterilely prepped and draped in the normal fashion.     Under tourniquet control, a 20 cm longitudinal incision was made over the anterior aspect of the knee.  Subcutaneous tissue was sharply dissected down to the deep fascia, which was incised along the line of the incision.  A standard medial parapatellar arthrotomy was made.  The proximal medial tibial plateau was then subperiosteally exposed protecting the medial collateral ligament.  A portion of the infrapatellar fat pad was excised.  The patella was everted and the knee was flexed to 90 degrees.    The tibial and femoral check points were then placed in the standard fashion. The tracking arrays were then placed by placing 2 parallel Schanz pins in both the tibia and the femur. The tibial pins were placed anteromedially approximately 1 cm distal to the tibial tubercle within the wound. The femoral pins were placed within the wound at the supracondylar femur.     After confirming the check points, the hip was rotated to assess the center of rotation of the hip.     The femoral and tibial surfaces were then mapped in the standard fashion for RODY total knee replacement. Once surface mapping was complete, the knee was placed infull extension  while applying gentle varus and valgus stress followed by flexion at 90 degrees using curved osteotomes to assess soft tissue tightness and the flexion-extension tension gaps were equalized by adjusting the component positions.     Once this was completed, the RODY unit was placed and secured and check points on the robotic arm were confirmed. The femoral preparation using the saw was first performed in the standard fashion by cutting the distal, posterior champher, anterior, and anterior champher cuts and once the femoral preparation was complete, the tibial cut was performed protecting the patellar tendon and patellar tendon.  The ACL was cut and the PCL retained.  All bone fragments were removed and the medial and lateral menisci were removed. Once completion of both femoral and tibial preparation was done, the tibial baseplate trial was placed, impacting the keel into place.The femoral trial was then impacted as well. The  12  mm trial implant was then placed. The knee was placed through a range of motion and tension was checked throughout full range of motion was noted to be satisfactory.     At this time, all trial components were removed. The tibial, femoral, and patellar surfaces were thoroughly irrigated in a pulse lavage fashion and dried. The tibial component was impacted in position using a press fit technique/design. The press fit femur was also impacted into position.      We did elect to resurface the patella    The patellar cutting guide was then used to remove the dorsal 10 mm of the patellar surface to a final thickness of 14 mm.  This was sized to a size 29x8 . Drill holes were placed. We did elect to cement the patella button.     The tibial liner was placed.  Any residual soft tissue impingement or soft tissue debris was removed and the final tibial polyethylene was then locked into position.     The wound was then thoroughly irrigated with dilute betadine. The check points were removed along  with the Schanz pins for the tibial and femoral arrays.    The capsule and parapatellar retinaculum were then closed with a running barbed suture, Deep fascia closed with 0 vicryl and the subcutaneous layer closed with 2-0 vicryl. Skin was closed using monocryl and dermabond glue. Sterile dressing was applied and the patient was returned to the Postanesthesia Care Unit in stable condition.    Disposition:     Weight bearing as tolerated, early goal directed Physical Therapy. DVT ppx for 30 days.   Follow up in my clinic in 2 weeks for wound check and ROM check.

## 2023-10-05 NOTE — ANESTHESIA PREPROCEDURE EVALUATION
10/05/2023  Lizette Palafox is a 73 y.o., female.      Pre-op Assessment    I have reviewed the Patient Summary Reports.     I have reviewed the Nursing Notes. I have reviewed the NPO Status.   I have reviewed the Medications.     Review of Systems  Anesthesia Hx:  No problems with previous Anesthesia    Social:  Non-Smoker    Hematology/Oncology:         -- Anemia:   Cardiovascular:   Hypertension, well controlled hyperlipidemia    Pulmonary:  Pulmonary Normal    Renal/:  Renal/ Normal     Hepatic/GI:   GERD, well controlled    Musculoskeletal:   Arthritis (Rheumatoid Arthritis)   Spine Disorders: lumbar    Neurological:   Neuromuscular Disease,    Endocrine:   Diabetes, well controlled, type 2 Hypothyroidism        Physical Exam  General: Well nourished, Cooperative, Alert and Oriented    Airway:  Mallampati: II   Mouth Opening: Normal  TM Distance: Normal  Neck ROM: Normal ROM        Anesthesia Plan  Type of Anesthesia, risks & benefits discussed:    Anesthesia Type: Gen ETT, Gen Supraglottic Airway, Gen Natural Airway, MAC, Spinal  Intra-op Monitoring Plan: Standard ASA Monitors  Post Op Pain Control Plan: multimodal analgesia and peripheral nerve block  Induction:  IV  Airway Plan: Direct, Video and Fiberoptic, Post-Induction  Informed Consent: Informed consent signed with the Patient and all parties understand the risks and agree with anesthesia plan.  All questions answered.   ASA Score: 3    Ready For Surgery From Anesthesia Perspective.     .

## 2023-10-05 NOTE — ANESTHESIA POSTPROCEDURE EVALUATION
Anesthesia Post Evaluation    Patient: Lizette Palafox    Procedure(s) Performed: Procedure(s) (LRB):  ROBOTIC ARTHROPLASTY, KNEE, TOTAL (Left)    Final Anesthesia Type: spinal      Patient location during evaluation: PACU  Patient participation: Yes- Able to Participate  Level of consciousness: awake and alert and oriented  Post-procedure vital signs: reviewed and stable  Pain management: adequate  Airway patency: patent    PONV status at discharge: No PONV  Anesthetic complications: no      Cardiovascular status: blood pressure returned to baseline and stable  Respiratory status: unassisted and spontaneous ventilation  Hydration status: euvolemic  Follow-up not needed.          Vitals Value Taken Time   /57 10/05/23 1525   Temp 36.3 °C (97.3 °F) 10/05/23 1440   Pulse 53 10/05/23 1525   Resp 16 10/05/23 1525   SpO2 99 % 10/05/23 1525         Event Time   Out of Recovery 14:53:00         Pain/Berta Score: Pain Rating Prior to Med Admin: 8 (10/5/2023  8:47 AM)  Berta Score: 9 (10/5/2023  2:30 PM)  Modified Berta Score: 19 (10/5/2023  2:53 PM)

## 2023-10-05 NOTE — BRIEF OP NOTE
[facility]  Brief Operative Note    Surgery Date: 10/5/2023     Surgeon(s) and Role:     * Cornelio Cota MD - Primary    Assisting Surgeon: None    Pre-op Diagnosis:  Primary osteoarthritis of left knee [M17.12]  Pre-op exam [Z01.818]    Post-op Diagnosis:  Post-Op Diagnosis Codes:     * Primary osteoarthritis of left knee [M17.12]     * Pre-op exam [Z01.818]    Procedure(s) (LRB):  ROBOTIC ARTHROPLASTY, KNEE, TOTAL (Left)    Anesthesia: Spinal    Operative Findings: see op note, severe DJD    Estimated Blood Loss: * No values recorded between 10/5/2023 10:52 AM and 10/5/2023 12:22 PM *         Specimens:   Specimen (24h ago, onward)      None              Discharge Note    OUTCOME: Patient tolerated treatment/procedure well without complication and is now ready for discharge.    DISPOSITION: Home or Self Care    FINAL DIAGNOSIS:  <principal problem not specified>    FOLLOWUP: In clinic    DISCHARGE INSTRUCTIONS:  No discharge procedures on file.

## 2023-10-05 NOTE — ANESTHESIA PROCEDURE NOTES
Spinal    Diagnosis: left knee arthroplasty  Patient location during procedure: OR  Start time: 10/5/2023 10:21 AM  Timeout: 10/5/2023 10:21 AM  End time: 10/5/2023 10:31 AM    Staffing  Authorizing Provider: Henry Long MD  Performing Provider: Henry Long MD    Staffing  Performed by: Henry Long MD  Authorized by: Henry Long MD    Preanesthetic Checklist  Completed: patient identified, IV checked, site marked, risks and benefits discussed, surgical consent, monitors and equipment checked, pre-op evaluation and timeout performed  Spinal Block  Patient position: sitting  Prep: Betadine and ChloraPrep  Patient monitoring: heart rate, cardiac monitor, continuous pulse ox, continuous capnometry and frequent blood pressure checks  Approach: right paramedian  Location: L2-3  Injection technique: single shot  CSF Fluid: clear free-flowing CSF and blood-tinged free-flowing CSF  Needle  Needle type: pencil-tip   Needle gauge: 25 G  Needle length: 4 in  Additional Documentation: incremental injection, negative aspiration for heme and no paresthesia on injection  Needle localization: anatomical landmarks  Assessment  Sensory level: T4   Dermatomal levels determined by pinch or prick  Ease of block: difficult  Patient's tolerance of the procedure: comfortable throughout block and no complaints  Additional Notes  +Barbotage  Medications:    Medications: bupivacaine (pf) (MARCAINE) injection 0.75% - Intraspinal   1.5 mL - 10/5/2023 10:31:00 AM

## 2023-10-05 NOTE — PLAN OF CARE
ASA 81 daily post-op order noted. Dr. Cota notified. Per Dr. Cota, pt to take ASA 81 twice daily starting tonight. Educated pt and pt's daughter. Pt and pt's daughter verbalized understanding.

## 2023-10-05 NOTE — TRANSFER OF CARE
"Anesthesia Transfer of Care Note    Patient: Lizette Palafox    Procedure(s) Performed: Procedure(s) (LRB):  ROBOTIC ARTHROPLASTY, KNEE, TOTAL (Left)    Patient location: PACU    Anesthesia Type: spinal    Transport from OR: Transported from OR on 6-10 L/min O2 by face mask with adequate spontaneous ventilation    Post pain: adequate analgesia    Post assessment: no apparent anesthetic complications and tolerated procedure well    Post vital signs: stable    Level of consciousness: awake and responds to stimulation    Nausea/Vomiting: no nausea/vomiting    Complications: none    Transfer of care protocol was followed      Last vitals:   Visit Vitals  BP (!) 81/50   Pulse (!) 54   Temp 36.6 °C (97.9 °F) (Skin)   Resp 10   Ht 5' 1" (1.549 m)   Wt 72.6 kg (160 lb)   SpO2 100%   Breastfeeding No   BMI 30.23 kg/m²     "

## 2023-10-05 NOTE — ANESTHESIA PROCEDURE NOTES
Left Knee Adductor Canal    Patient location during procedure: pre-op   Block not for primary anesthetic.  Reason for block: at surgeon's request and post-op pain management   Post-op Pain Location: left knee pain   Start time: 10/5/2023 9:10 AM  Timeout: 10/5/2023 9:10 AM   End time: 10/5/2023 9:15 AM    Staffing  Authorizing Provider: Henry Long MD  Performing Provider: Henry Long MD    Staffing  Performed by: Henry Long MD  Authorized by: Henry Long MD    Preanesthetic Checklist  Completed: patient identified, IV checked, site marked, risks and benefits discussed, surgical consent, monitors and equipment checked, pre-op evaluation and timeout performed  Peripheral Block  Patient position: supine  Prep: ChloraPrep  Patient monitoring: heart rate, cardiac monitor, continuous pulse ox, continuous capnometry and frequent blood pressure checks  Block type: adductor canal  Laterality: left  Injection technique: single shot  Needle  Needle type: Stimuplex   Needle gauge: 21 G  Needle length: 4 in  Needle localization: anatomical landmarks and ultrasound guidance  Needle insertion depth: 5 cm   -ultrasound image captured on disc.  Assessment  Injection assessment: negative aspiration, negative parasthesia and local visualized surrounding nerve  Paresthesia pain: none  Heart rate change: no  Slow fractionated injection: yes  Pain Tolerance: comfortable throughout block and no complaints  Medications:    Medications: bupivacaine (pf) (MARCAINE) injection 0.5% - Perineural   10 mL - 10/5/2023 9:10:00 AM    Additional Notes  VSS.  [facility] RN monitoring vitals throughout procedure.  Patient tolerated procedure well.     Exparel 20ml + Marcaine 0.5% 10ml dosed under direct Ultrasound guidance.

## 2023-10-05 NOTE — PT/OT/SLP EVAL
"Physical Therapy Evaluation and Discharge Note    Patient Name:  Lizette Palafox   MRN:  [id number]    Recommendations:     Discharge Recommendations: home health PT  Discharge Equipment Recommendations: none (has RW)   Barriers to discharge: None    Assessment:     Lizette Palafox is a 73 y.o. female admitted with a medical diagnosis of <principal problem not specified>. .  Pt seen alert, interactive/motivated. Daughter at bedside who stated pt was ambulatory household distance only and LK  is deformed. Pt completed thera ex in supine. Min assist to sit EOB. Pt stood with RW and few steps to chair then taken to hallways and ambulated 100ft x2 with min assist and another person following with chair. Pt completed stair training with min assist 5 ascending and descending.   Pt  also voided in bathroom.   Pt will benefit from HHPT.  Daughter and pt provided with gait belt for discharge, educated on its use and purpose.    Recent Surgery: Procedure(s) (LRB):  ROBOTIC ARTHROPLASTY, KNEE, TOTAL (Left) Day of Surgery    Plan:     During this hospitalization, patient does not require further acute PT services.  Please re-consult if situation changes.      Subjective   Pt pleased about ability to walk further distance with no pain. "This is the first time I'm able to walk far with no pain"  Daughter stated, was only walking ~50ft at home and stays in wheelchair  Chief Complaint: none  Patient/Family Comments/goals: get well  Pain/Comfort:  Pain Rating 1: 0/10    Patients cultural, spiritual, Tenriism conflicts given the current situation:      Living Environment:  Home with daughter  3 steps to enter home  Prior to admission, patients level of function was ambulatory household distance only limited by pain.  Equipment used at home: rollator.  DME owned (not currently used): none.  Upon discharge, patient will have assistance from family.    Objective:     Communicated with nurse Resendiz prior to session.  Patient found HOB " elevated with blood pressure cuff, pulse ox (continuous), telemetry upon PT entry to room.    General Precautions: Standard, fall    Orthopedic Precautions:LLE weight bearing as tolerated   Braces: N/A  Respiratory Status: Room air    Exams:  Postural Exam:  Patient presented with the following abnormalities:    -       Rounded shoulders  -       Forward head  -       BMI 30  RLE ROM: WFL  RLE Strength: WFL  LLE ROM: Deficits: LK flexion ~80*  LLE Strength: Deficits: 3/5    Functional Mobility:  Bed Mobility:     Scooting: minimum assistance  Supine to Sit: minimum assistance  Transfers:     Sit to Stand:  minimum assistance with rolling walker  Bed to Chair: minimum assistance with  rolling walker  using  Stand Pivot  Toilet Transfer: minimum assistance with  rolling walker  using  Stand Pivot  Gait: 100ft x2 with RW min assist and another person following with chair. Pt took 1 seated rest  Stairs:  Pt ascended/descended 5 stair(s) with No Assistive Device with bilateral handrails with Minimal Assistance. Crepitus/ creaking audible sound RK while descending     AM-PAC 6 CLICK MOBILITY  Total Score:18       Treatment and Education:  Patient was educated on the importance of OOB activity and functional mobility to negate negative effects of prolonged bed rest during hospitalization, safe transfers and ambulation, and D/C planning   Pt completed thera ex with AP,QS/GS,SLR and HS  Gait at hallways with RW with daughter following with chair  Stair training completed  Bathroom use with successful voiding  Back to room and up in chair    AM-PAC 6 CLICK MOBILITY  Total Score:18     Patient left up in chair with  nurse Astrid notified and daughter present.    GOALS:   Multidisciplinary Problems       Physical Therapy Goals       Not on file                    History:     Past Medical History:   Diagnosis Date    Anemia, unspecified 05/19/2021    Arthritis     Rheumatoid    Encounter for blood transfusion     GERD  (gastroesophageal reflux disease)     Gout     Hypertension     Macrocytosis 2021    Personal history of colonic polyps 2012    Prediabetes     Thyroid disease     Transfusion reaction        Past Surgical History:   Procedure Laterality Date    APPENDECTOMY       SECTION      x3    DILATION AND CURETTAGE OF UTERUS      GALLBLADDER SURGERY      HYSTERECTOMY      TOTAL HIP ARTHROPLASTY         Time Tracking:     PT Received On: 10/05/23  PT Start Time: 1553     PT Stop Time: 1655  PT Total Time (min): 62 min     Billable Minutes: Evaluation 12, Gait Training 30, and Therapeutic Exercise 20      10/05/2023

## 2023-10-06 VITALS
WEIGHT: 160 LBS | OXYGEN SATURATION: 100 % | DIASTOLIC BLOOD PRESSURE: 57 MMHG | HEART RATE: 55 BPM | TEMPERATURE: 97 F | BODY MASS INDEX: 30.21 KG/M2 | RESPIRATION RATE: 16 BRPM | SYSTOLIC BLOOD PRESSURE: 106 MMHG | HEIGHT: 61 IN

## 2023-10-06 PROCEDURE — G0180 PR HOME HEALTH MD CERTIFICATION: ICD-10-PCS | Mod: ,,, | Performed by: ORTHOPAEDIC SURGERY

## 2023-10-06 PROCEDURE — G0180 MD CERTIFICATION HHA PATIENT: HCPCS | Mod: ,,, | Performed by: ORTHOPAEDIC SURGERY

## 2023-10-06 NOTE — PROGRESS NOTES
Very pleased with care given.  States all staff were very kind and supportive.  States they have read and understand all discharge instructions.  HH coming today. Using ice to knee as needed.

## 2023-10-13 DIAGNOSIS — Z96.652 S/P TOTAL KNEE REPLACEMENT, LEFT: Primary | ICD-10-CM

## 2023-10-17 ENCOUNTER — HOSPITAL ENCOUNTER (OUTPATIENT)
Dept: RADIOLOGY | Facility: HOSPITAL | Age: 73
Discharge: HOME | End: 2023-10-17
Attending: ORTHOPAEDIC SURGERY
Payer: COMMERCIAL

## 2023-10-17 ENCOUNTER — OFFICE VISIT (OUTPATIENT)
Dept: ORTHOPEDICS | Facility: CLINIC | Age: 73
End: 2023-10-17
Payer: COMMERCIAL

## 2023-10-17 VITALS — HEIGHT: 61 IN | WEIGHT: 160 LBS | RESPIRATION RATE: 16 BRPM | BODY MASS INDEX: 30.21 KG/M2

## 2023-10-17 DIAGNOSIS — Z96.652 S/P TOTAL KNEE REPLACEMENT, LEFT: ICD-10-CM

## 2023-10-17 DIAGNOSIS — Z96.652 S/P TOTAL KNEE REPLACEMENT, LEFT: Primary | ICD-10-CM

## 2023-10-17 PROCEDURE — 99024 PR POST-OP FOLLOW-UP VISIT: ICD-10-PCS | Mod: S$GLB,,, | Performed by: ORTHOPAEDIC SURGERY

## 2023-10-17 PROCEDURE — 73560 X-RAY EXAM OF KNEE 1 OR 2: CPT | Mod: TC,PO,LT

## 2023-10-17 PROCEDURE — 99999 PR PBB SHADOW E&M-EST. PATIENT-LVL III: CPT | Mod: PBBFAC,,, | Performed by: ORTHOPAEDIC SURGERY

## 2023-10-17 PROCEDURE — 4010F ACE/ARB THERAPY RXD/TAKEN: CPT | Mod: CPTII,S$GLB,, | Performed by: ORTHOPAEDIC SURGERY

## 2023-10-17 PROCEDURE — 4010F PR ACE/ARB THEARPY RXD/TAKEN: ICD-10-PCS | Mod: CPTII,S$GLB,, | Performed by: ORTHOPAEDIC SURGERY

## 2023-10-17 PROCEDURE — 99999 PR PBB SHADOW E&M-EST. PATIENT-LVL III: ICD-10-PCS | Mod: PBBFAC,,, | Performed by: ORTHOPAEDIC SURGERY

## 2023-10-17 PROCEDURE — 99024 POSTOP FOLLOW-UP VISIT: CPT | Mod: S$GLB,,, | Performed by: ORTHOPAEDIC SURGERY

## 2023-10-17 PROCEDURE — 73560 X-RAY EXAM OF KNEE 1 OR 2: CPT | Mod: 26,LT,, | Performed by: RADIOLOGY

## 2023-10-17 PROCEDURE — 1159F PR MEDICATION LIST DOCUMENTED IN MEDICAL RECORD: ICD-10-PCS | Mod: CPTII,S$GLB,, | Performed by: ORTHOPAEDIC SURGERY

## 2023-10-17 PROCEDURE — 73560 XR KNEE 1 OR 2 VIEW LEFT: ICD-10-PCS | Mod: 26,LT,, | Performed by: RADIOLOGY

## 2023-10-17 PROCEDURE — 1159F MED LIST DOCD IN RCRD: CPT | Mod: CPTII,S$GLB,, | Performed by: ORTHOPAEDIC SURGERY

## 2023-10-17 RX ORDER — OXYCODONE AND ACETAMINOPHEN 5; 325 MG/1; MG/1
1 TABLET ORAL EVERY 6 HOURS PRN
Qty: 28 TABLET | Refills: 0 | Status: SHIPPED | OUTPATIENT
Start: 2023-10-17 | End: 2023-11-07 | Stop reason: SDUPTHER

## 2023-10-17 NOTE — PROGRESS NOTES
Post-op Note    HPI    Lizette Palafox is here 2 weeks s/p the following procedure:     Left total knee replacement    Overall doing well. Pain controlled on current regimen. She is currently enrolled in Physical Therapy. Denies any chest pain or shortness of breathe. Denies any drainage from the incision. Denies any fevers, chills or paresthesias.     DVT Prophylaxis:  Aspirin      Physical Exam:     Patient is alert and oriented no acute distress.   Assistive Device:  Wheelchair currently    Left knee Incision(s) are well healed.  There is no evidence of dehiscence.  There is no induration erythema or signs of infection.  Appropriate soft tissue swelling, moderate effusion.  Compartments are soft and compressible.  Warm well-perfused extremity.  Range of motion 10-90.  Stable to varus and valgus stress    Imaging:     I have personally reviewed the following imaging and these are an interpretation of my findings:     X-Ray: I have reviewed all pertinent results/findings and my personal findings are:  Well-positioned TKA with a appropriate alignment.  No complication noted    Assessment    Lizette Palafox is 2 weeks Post-op     Plan:    Overall doing as expected.  We discussed expectations of surgery and postoperative course.     Pain: Continued postoperative pain regimen -- refill pain medication today  DVT prophylaxis:  Aspirin 30 days postop  PT/OT: Continue/Initiate physical therapy (weight bearing status:  Weight-bearing as tolerated), no restriction     Follow-up: 4 weeks   X-rays next visit:  None

## 2023-10-20 ENCOUNTER — TELEPHONE (OUTPATIENT)
Dept: ORTHOPEDICS | Facility: CLINIC | Age: 73
End: 2023-10-20
Payer: COMMERCIAL

## 2023-10-20 DIAGNOSIS — Z96.652 S/P TOTAL KNEE REPLACEMENT, LEFT: Primary | ICD-10-CM

## 2023-10-20 NOTE — TELEPHONE ENCOUNTER
----- Message from Shreya Chew MA sent at 10/20/2023 12:49 PM CDT -----  Contact: [facility]  PT   [place]  Never hear from OUT pt therapy   This is last day for HH   No call back needed

## 2023-10-24 ENCOUNTER — TELEPHONE (OUTPATIENT)
Dept: ORTHOPEDICS | Facility: CLINIC | Age: 73
End: 2023-10-24
Payer: COMMERCIAL

## 2023-10-24 NOTE — TELEPHONE ENCOUNTER
----- Message from Shreya Chew MA sent at 10/24/2023  1:17 PM CDT -----  Contact: pedro  daughter  Pt has been having numbess in  Left left leg  Call back

## 2023-10-24 NOTE — TELEPHONE ENCOUNTER
Pt daughter advised to ice and elevate leag as pt is complaining of heaviness in leg. Pt has started PT and this could be from being sore. Pt daughter advised to contact office Thursday and let us know if anything worsened or improved. Of worse, we will see her Friday. For evaluation.

## 2023-11-01 ENCOUNTER — EXTERNAL HOME HEALTH (OUTPATIENT)
Dept: HOME HEALTH SERVICES | Facility: HOSPITAL | Age: 73
End: 2023-11-01
Payer: COMMERCIAL

## 2023-11-07 DIAGNOSIS — Z96.652 S/P TOTAL KNEE REPLACEMENT, LEFT: ICD-10-CM

## 2023-11-07 RX ORDER — OXYCODONE AND ACETAMINOPHEN 5; 325 MG/1; MG/1
1 TABLET ORAL EVERY 6 HOURS PRN
Qty: 28 TABLET | Refills: 0 | Status: SHIPPED | OUTPATIENT
Start: 2023-11-07 | End: 2023-11-14 | Stop reason: SDUPTHER

## 2023-11-07 NOTE — TELEPHONE ENCOUNTER
----- Message from Shreya Chew MA sent at 11/7/2023  1:48 PM CST -----  Contact: candice  daughter  Refil pain   Pharmacy  [facility]   Call back

## 2023-11-09 PROBLEM — Z96.652 S/P TOTAL KNEE REPLACEMENT, LEFT: Status: ACTIVE | Noted: 2023-11-09

## 2023-11-14 ENCOUNTER — HOSPITAL ENCOUNTER (OUTPATIENT)
Dept: RADIOLOGY | Facility: HOSPITAL | Age: 73
Discharge: HOME | End: 2023-11-14
Attending: ORTHOPAEDIC SURGERY
Payer: COMMERCIAL

## 2023-11-14 ENCOUNTER — OFFICE VISIT (OUTPATIENT)
Dept: ORTHOPEDICS | Facility: CLINIC | Age: 73
End: 2023-11-14
Payer: COMMERCIAL

## 2023-11-14 DIAGNOSIS — Z96.652 S/P TOTAL KNEE REPLACEMENT, LEFT: Primary | ICD-10-CM

## 2023-11-14 DIAGNOSIS — Z96.652 S/P TOTAL KNEE REPLACEMENT, LEFT: ICD-10-CM

## 2023-11-14 PROCEDURE — 73560 X-RAY EXAM OF KNEE 1 OR 2: CPT | Mod: TC,PO,LT

## 2023-11-14 PROCEDURE — 73560 X-RAY EXAM OF KNEE 1 OR 2: CPT | Mod: 26,LT,, | Performed by: RADIOLOGY

## 2023-11-14 PROCEDURE — 4010F ACE/ARB THERAPY RXD/TAKEN: CPT | Mod: CPTII,S$GLB,, | Performed by: ORTHOPAEDIC SURGERY

## 2023-11-14 PROCEDURE — 99024 POSTOP FOLLOW-UP VISIT: CPT | Mod: S$GLB,,, | Performed by: ORTHOPAEDIC SURGERY

## 2023-11-14 PROCEDURE — 73560 XR KNEE 1 OR 2 VIEW LEFT: ICD-10-PCS | Mod: 26,LT,, | Performed by: RADIOLOGY

## 2023-11-14 PROCEDURE — 4010F PR ACE/ARB THEARPY RXD/TAKEN: ICD-10-PCS | Mod: CPTII,S$GLB,, | Performed by: ORTHOPAEDIC SURGERY

## 2023-11-14 PROCEDURE — 99024 PR POST-OP FOLLOW-UP VISIT: ICD-10-PCS | Mod: S$GLB,,, | Performed by: ORTHOPAEDIC SURGERY

## 2023-11-14 RX ORDER — OXYCODONE AND ACETAMINOPHEN 5; 325 MG/1; MG/1
1 TABLET ORAL EVERY 6 HOURS PRN
Qty: 28 TABLET | Refills: 0 | Status: SHIPPED | OUTPATIENT
Start: 2023-11-14

## 2023-11-14 NOTE — PROGRESS NOTES
Post-op Note    HPI    Lizette Palafox is here 6 weeks s/p the following procedure:     Left total knee replacement    Overall doing well.  Going to physical therapy this afternoon.  No pain left knee except sometimes at nighttime.  Walking with a walker.  Getting around well.      Physical Exam:     Patient is alert and oriented no acute distress.   Assistive Device:  Walker currently    Left knee Incision(s) are well healed.  There is no evidence of dehiscence.  There is no induration erythema or signs of infection.  Appropriate soft tissue swelling, moderate effusion.  Compartments are soft and compressible.  Warm well-perfused extremity.  Range of motion 0-120.  Stable to varus and valgus stress    Imaging:     I have personally reviewed the following imaging and these are an interpretation of my findings:     X-Ray: I have reviewed all pertinent results/findings and my personal findings are:  Well-positioned TKA with a appropriate alignment.  No complication noted    Assessment    Lizette Palafox is 6 weeks Post-op     Plan:    Overall doing as expected.  We discussed expectations of surgery and postoperative course.     Pain: Continued postoperative pain regimen -- refill pain medication today -- last refill  DVT prophylaxis:  Aspirin 30 days postop completed  PT/OT: Continue/Initiate physical therapy (weight bearing status:  Weight-bearing as tolerated), no restriction     Follow-up: 6-8 weeks   X-rays next visit:  None        
English

## 2023-11-15 ENCOUNTER — TELEPHONE (OUTPATIENT)
Dept: FAMILY MEDICINE | Facility: CLINIC | Age: 73
End: 2023-11-15

## 2023-11-15 ENCOUNTER — CLINICAL SUPPORT (OUTPATIENT)
Dept: REHABILITATION | Facility: HOSPITAL | Age: 73
End: 2023-11-15
Attending: ORTHOPAEDIC SURGERY
Payer: COMMERCIAL

## 2023-11-15 DIAGNOSIS — M25.60 DECREASED RANGE OF MOTION: ICD-10-CM

## 2023-11-15 DIAGNOSIS — R26.89 DECREASED FUNCTIONAL MOBILITY: ICD-10-CM

## 2023-11-15 DIAGNOSIS — R53.1 DECREASED STRENGTH: ICD-10-CM

## 2023-11-15 DIAGNOSIS — M62.89 MUSCLE TIGHTNESS: ICD-10-CM

## 2023-11-15 DIAGNOSIS — Z96.652 S/P TOTAL KNEE REPLACEMENT, LEFT: Primary | ICD-10-CM

## 2023-11-15 PROCEDURE — 97110 THERAPEUTIC EXERCISES: CPT | Mod: PN

## 2023-11-15 PROCEDURE — 97161 PT EVAL LOW COMPLEX 20 MIN: CPT | Mod: PN

## 2023-11-15 NOTE — PLAN OF CARE
[facility]  Physical Therapy Initial Evaluation    Name: Lizette Palafox  Clinic Number: [id number]    Therapy Diagnosis:   Encounter Diagnoses   Name Primary?    S/P total knee replacement, left Yes    Decreased range of motion     Muscle tightness     Decreased strength     Decreased functional mobility      Physician: Cornelio Cota MD   Physician Orders: PT Eval and Treat  Medical Diagnosis from Referral: Z96.652 (ICD-10-CM) - S/P total knee replacement, left  Evaluation Date: 11/15/2023  Authorization Period Expiration: 12/31/2023  Plan of Care Expiration: 2/28/2024    Progress Update: 1215/2023  FOTO: 1 / 3   Visit # / Visits authorized: 1 / 1       PRECAUTIONS: Standard Precautions     Time In: 1105  (Pnt arrived late)  Time Out: 1145  Total Appointment Time (timed & untimed codes): 40 minutes    SUBJECTIVE     Chief complaint:  L TKA  DOS:  10/5/2023    History of current condition:   L TKA  DOS:  10/5/2023    Previous episode:  none    Aggravating Factors: walking  Easing Factors: rest    Imaging:  See epic.    Prior Therapy: N/A  Social History: Pt lives with their daughter  Occupation: Pt is retired.  Prior Level of Function: Independent with all ADLs  Current Level of Function: 60% of PLOF    Pain:  Current 0 /10, worst 4 /10, best 0 /10   Description: Aching and Dull    Pts goals: Pt reported goal is to walk without the walker.    _______________________________________________________  Medical History:   Past Medical History:   Diagnosis Date    Anemia, unspecified 05/19/2021    Arthritis     Rheumatoid    Encounter for blood transfusion     GERD (gastroesophageal reflux disease)     Gout     Hypertension     Macrocytosis 05/19/2021    Personal history of colonic polyps 06/05/2012    Prediabetes     Thyroid disease     Transfusion reaction        Surgical History:   Lizette Palafox  has a past surgical history that includes Total hip arthroplasty; Appendectomy; Gallbladder  surgery;  section; Dilation and curettage of uterus; Hysterectomy; and robotic arthroplasty, knee (Left, 10/5/2023).    Medications:   Lizette has a current medication list which includes the following prescription(s): allopurinol, aspirin, aspirin, carvedilol, diclofenac sodium, docusate sodium, ferrous sulfate, folic acid, gabapentin, ibuprofen, ketoconazole, levothyroxine, methotrexate, omeprazole, ondansetron, oxycodone-acetaminophen, pravastatin, tramadol, triamcinolone acetonide 0.025%, valsartan, and vitamin d, and the following Facility-Administered Medications: electrolyte-s (ph 7.4), electrolyte-s (ph 7.4), fentanyl, lidocaine (pf) 10 mg/ml (1%), metoclopramide hcl, and oxycodone.    Allergies:   Review of patient's allergies indicates:   Allergen Reactions    Levaquin [levofloxacin] Hives and Edema    Tramadol Nausea And Vomiting        OBJECTIVE   (x = not tested due to pain and/or inability to obtain test position)    RANGE OF MOTION:      Hip AROM/PROM Right  11/15/2023 Left  11/15/2023 Goal   Hip Flexion (120) wfl wfl 115     Hip IR (45) wfl wfl 40     Hip ER (45) wfl wfl 40         Knee AROM/PROM Right  11/15/2023 Left  11/15/2023 Goal   Hyper - Zero - Flexion wfl 0-2-110 0-0-135       Ankle/Foot AROM/PROM Right  11/15/2023 Left  11/15/2023 Goal   Dorsiflexion (20) wfl wfl 15     Plantarflexion (50) wfl wfl 45     Great Toe Extension (35) wfl wfl 30         STRENGTH:    L/E MMT Right  11/15/2023 Goal   Hip Flexion  4-/5 5/5 B    Hip Extension  4-/5 5/5 B   Hip Abduction  4-/5 5/5 B   Hip IR 4-/5 5/5 B   Hip ER 4-/5 5/5 B   Knee Flexion 4-/5 5/5 B   Knee Extension 4-/5 5/5 B   Ankle DF 4-/5 5/5 B   Ankle PF 4-/5 5/5 B   Ankle Inversion 4-/5 5/5 B   Ankle Eversion 4-/5 5/5 B   Big Toe Extension 4-/ B     L/E MMT Left  11/15/2023 Goal   Hip Flexion  -/ B    Hip Extension  / B   Hip Abduction  / B   Hip IR / B   Hip ER / B   Knee Flexion / B   Knee  Extension 4-/5 5/5 B   Ankle DF 4-/5 5/5 B   Ankle PF 4-/5 5/5 B   Ankle Inversion 4-/5 5/5 B   Ankle Eversion 4-/5 5/5 B   Big Toe Extension 4-/5 5/5 B       MUSCLE LENGTH:     Muscle Tested  Right  11/15/2023 Left   11/15/2023 Goal   Hip Flexors  decreased decreased Normal B   Quadriceps normal normal Normal B   Hamstrings  decreased decreased Normal B   Piriformis  normal normal Normal B   Gastrocnemius  decreased decreased Normal B   Soleus  decreased decreased Normal B     Observation:  No edema, ecchymosis noted.  Sensation:  Sensation is intact to light touch  Palpation:  No TTP.  Posture:  Pt presents with postural abnormalities which include: forward head and rounded shoulders  Gait Analysis: The patient ambulated with the following assistive device: rolling walker; the pt presents with the following gait abnormalities: decreased step length, sd, and arm swing; increased forward flexed posture, trunk sway     Movement Analysis:   Functional Test  Outcome   Double Leg Squat 1/4 squat with pain   Single Leg Step Down NT           TREATMENT   Total Treatment time separate from Evaluation: (10) minutes    Lizette received therapeutic exercises to develop strength, endurance, ROM, flexibility, and posture for (10) minutes including: x = exercises performed     TherEx 11/15/2023    SLRs x 3x10   Mini squats with support x x10   Heel slides x x10          Plan for Next Visit:     Stationary bike x 10 mins  Quad sets with towel  3x10  SLRs  3x10  SAQs  3x10  Hip adduction with ball 3x10  Clams with red tb 3x10   Bridges  2x10  Heel slides 3x10  Long Arc Quad 20x      TKE with ball 2x10   Mini squats with support  2x10     Seated marches  2x10  Seated heel/toe raises 3x10    Gait training         Home Exercises and Patient Education Provided    Education/Self-Care provided:   Patient educated on the impairments noted above and the effects of physical therapy intervention to improve overall condition and quality  of life.   Patient was educated on all the above exercise prior/during/after for proper posture, positioning, and execution for safe performance with home exercise program.     Written Home Exercises Provided: yes. Prefers: Electronic Copies  Exercises were reviewed and Lizette was able to demonstrate them prior to the end of the session.  Lizette demonstrated good understanding of the education provided.     See EMR under Patient Instructions for exercises provided during therapy sessions.    ASSESSMENT   Lizette is a 73 y.o. female referred to outpatient Physical Therapy with a medical diagnosis of Z96.652 (ICD-10-CM) - S/P total knee replacement, left. Lizette presents with clinical signs and symptoms that support this diagnosis with decreased knee and hip ROM, decreased hip girdle, quad, and hamstring Strength, patellar joint hypomobility, increased joint and soft tissue edema, and impaired functional mobility.    The above impairments will be addressed through manual therapy techniques, therapeutic exercises, functional training, and modalities as necessary. Patient was treated and educated on exercises for home program, progression of therapy, and benefits of therapy to achieve full functional mobility. Patient will benefit from skilled physical therapy to meet short and long term goals and return to prior level of function.    Pt prognosis is Good.   Pt will benefit from skilled outpatient Physical Therapy to address the deficits stated above and in the chart below, provide pt/family education, and to maximize pt's level of independence.     Plan of care discussed with patient: Yes  Pt's spiritual, cultural and educational needs considered and patient is agreeable to the plan of care and goals as stated below:     Anticipated Barriers for therapy: none    Medical Necessity is demonstrated by the following:   History  Co-morbidities and personal factors that may impact the plan of care Co-morbidities:   advanced  "age    Personal Factors:   no deficits     low   Examination  Body Structures and Functions, activity limitations and participation restrictions that may impact the plan of care Body Regions:   lower extremities    Body Systems:    gross symmetry  ROM  strength  gross coordinated movement  balance  gait  motor control  motor learning    Participation Restrictions:   See above in "Current Level of Function"     Activity limitations:   Learning and applying knowledge  no deficits    General Tasks and Commands  no deficits    Communication  no deficits    Mobility  no deficits    Self care  no deficits    Domestic Life  no deficits    Interactions/Relationships  no deficits    Life Areas  no deficits    Community and Social Life  community life  recreation and leisure  no deficits         low   Clinical Presentation stable and uncomplicated low   Decision Making/ Complexity Score: low       GOALS:  SHORT TERM GOALS: 4 weeks 11/15/2023   Recent signs and systems trend is improving in order to progress towards LTG's.    Patient will be independent with HEP in order to further progress and return to maximal function.    Pain rating at Worst: 5/10 in order to progress towards increased independence with activity.    Patient will be able to correct postural deviations in sitting and standing, to decrease pain and promote postural awareness for injury prevention.       LONG TERM GOALS: 8 weeks 11/15/2023   Patient will return to normal ADL, recreational, and work related activities with less pain and limitation.     Patient will improve AROM to stated goals in order to return to maximal functional potential.     Patient will improve Strength to stated goals of appropriate musculature in order to improve functional independence.     Pain Rating at Best: 1/10 to improve Quality of Life.     Patient will meet predicted functional outcome (FOTO) score: 80% to increase self-worth & perceived functional ability.    Patient will " have met/partially met personal goal of being able to walk without walker.          PLAN   Plan of care Certification: 11/15/2023 to 2/28/2024    Outpatient Physical Therapy 2 times weekly for 6 weeks to include any combination of the following interventions: virtual visits, dry needling, modalities, electrical stimulation (IFC, Pre-Mod, Attended with Functional Dry Needling), Manual Therapy, Moist Heat/ Ice, Neuromuscular Re-ed, Patient Education, Self Care, Therapeutic Exercise, Functional Training, and Therapeutic Activites     Thank you for this referral.    These services are reasonable and necessary for the conditions set forth above while under my care.    Jimbo Troncoso, PT, DPT

## 2023-12-20 DIAGNOSIS — J11.1 INFLUENZA: Primary | ICD-10-CM

## 2023-12-20 RX ORDER — OSELTAMIVIR PHOSPHATE 75 MG/1
75 CAPSULE ORAL 2 TIMES DAILY
Qty: 10 CAPSULE | Refills: 0 | Status: SHIPPED | OUTPATIENT
Start: 2023-12-20 | End: 2023-12-25

## 2023-12-20 NOTE — TELEPHONE ENCOUNTER
----- Message from Yamile Garcia sent at 12/20/2023  8:15 AM CST -----  Pt's whole family has the flu and she is not feeling well, sore throat, congestion. Would like something called in so she does not have to come in   [facility]  [phone]

## 2023-12-20 NOTE — TELEPHONE ENCOUNTER
----- Message from Yamile Garcia sent at 12/20/2023  3:22 PM CST -----  Pt daughter  pedro is calling and everyone in the house has the flu. She is starting with a sore throat and would like something called in   [facility]   [phone]

## 2023-12-21 NOTE — TELEPHONE ENCOUNTER
prescription sent to       [facility] #[id number] - [place], [place] - [address]  AT [facility] & [facility]  [address] [facility] [address]  Phone: [phone] Fax: [phone]

## 2023-12-27 RX ORDER — PROMETHAZINE HYDROCHLORIDE 6.25 MG/5ML
6.25 SYRUP ORAL EVERY 4 HOURS PRN
Qty: 120 ML | Refills: 0 | Status: SHIPPED | OUTPATIENT
Start: 2023-12-27

## 2023-12-27 NOTE — TELEPHONE ENCOUNTER
Spoke with Adriana. She want rx sent to [facility] on [address].     Per Dr. Hood lantigua for Flonase and phenergan cough syrup

## 2023-12-27 NOTE — TELEPHONE ENCOUNTER
Spoke with Adriana, pt was sent tamiflu last week because the whole house had the flu. Pt now has a constant dry cough and congestion. Denies fever. Not taking anything over the counter. Adriana is requesting cough medication.

## 2023-12-27 NOTE — TELEPHONE ENCOUNTER
The patient's prescription has been approved and sent to [facility] #[id number] - [place], [place] - [address] [address]  Phone: [phone] Fax: [phone]

## 2023-12-27 NOTE — TELEPHONE ENCOUNTER
----- Message from Enma Farooq sent at 12/27/2023 11:03 AM CST -----  Pt has the flu and she is all out flunaze and still is feeling any better.  Adriana: [phone]

## 2023-12-29 ENCOUNTER — OFFICE VISIT (OUTPATIENT)
Dept: ORTHOPEDICS | Facility: CLINIC | Age: 73
End: 2023-12-29
Payer: COMMERCIAL

## 2023-12-29 VITALS — BODY MASS INDEX: 30.21 KG/M2 | HEIGHT: 61 IN | WEIGHT: 160 LBS

## 2023-12-29 DIAGNOSIS — Z96.652 S/P TOTAL KNEE REPLACEMENT, LEFT: Primary | ICD-10-CM

## 2023-12-29 PROCEDURE — 4010F ACE/ARB THERAPY RXD/TAKEN: CPT | Mod: CPTII,S$GLB,, | Performed by: ORTHOPAEDIC SURGERY

## 2023-12-29 PROCEDURE — 99024 PR POST-OP FOLLOW-UP VISIT: ICD-10-PCS | Mod: S$GLB,,, | Performed by: ORTHOPAEDIC SURGERY

## 2023-12-29 PROCEDURE — 4010F PR ACE/ARB THEARPY RXD/TAKEN: ICD-10-PCS | Mod: CPTII,S$GLB,, | Performed by: ORTHOPAEDIC SURGERY

## 2023-12-29 PROCEDURE — 99999 PR PBB SHADOW E&M-EST. PATIENT-LVL II: CPT | Mod: PBBFAC,,, | Performed by: ORTHOPAEDIC SURGERY

## 2023-12-29 PROCEDURE — 99024 POSTOP FOLLOW-UP VISIT: CPT | Mod: S$GLB,,, | Performed by: ORTHOPAEDIC SURGERY

## 2023-12-29 PROCEDURE — 99999 PR PBB SHADOW E&M-EST. PATIENT-LVL II: ICD-10-PCS | Mod: PBBFAC,,, | Performed by: ORTHOPAEDIC SURGERY

## 2023-12-29 NOTE — PROGRESS NOTES
Post-op Note    HPI    Lizette Palafox is here 12 weeks s/p the following procedure:     Left total knee replacement    Overall doing well.    Has not started outpatient therapy yet.  Overall doing well however despite not having therapy.  She is complaining of mild 2/10 pain.  Walking with a walker.      Physical Exam:     Patient is alert and oriented no acute distress.   Assistive Device:  Walker currently    Left knee Incision(s) are well healed.  There is no evidence of dehiscence.  There is no induration erythema or signs of infection.  Appropriate soft tissue swelling, moderate effusion.  Compartments are soft and compressible.  Warm well-perfused extremity.  Range of motion 0-120.  Stable to varus and valgus stress    Imaging:     I have personally reviewed the following imaging and these are an interpretation of my findings:     X-Ray: I have reviewed all pertinent results/findings and my personal findings are:  Well-positioned TKA with a appropriate alignment.  No complication noted    Assessment    Lizette Palafox is 12 weeks Post-op     Plan:    Overall doing as expected.  We discussed expectations of surgery and postoperative course.     Pain: Continued postoperative pain regimen --  Tylenol/ibuprofen  DVT prophylaxis:  Aspirin 30 days postop completed  PT/OT: Continue/Initiate physical therapy (weight bearing status:  Weight-bearing as tolerated), no restriction.  Transportation is an issue for her in regards to outpatient physical therapy which is why she has not started yet.    Follow-up: 6-8 weeks   X-rays next visit:    Left knee

## 2024-02-06 DIAGNOSIS — J11.1 INFLUENZA: Primary | ICD-10-CM

## 2024-02-06 RX ORDER — OSELTAMIVIR PHOSPHATE 75 MG/1
75 CAPSULE ORAL 2 TIMES DAILY
Qty: 10 CAPSULE | Refills: 0 | Status: SHIPPED | OUTPATIENT
Start: 2024-02-06 | End: 2024-02-11

## 2024-02-06 NOTE — TELEPHONE ENCOUNTER
----- Message from Kinsey Erazo sent at 2/6/2024  2:18 PM CST -----  Adriana the patients daughter said the Flu has hit her  house again. Her mom is complaining of a sore throat. Can you call something in. [facility] on  [address]  # [phone]

## 2024-02-06 NOTE — TELEPHONE ENCOUNTER
prescription sent to   [facility][id number] - [place] - [address] [facility] [facility]  [address]  [place] [address]  Phone: [phone] Fax: [phone]

## 2024-02-12 RX ORDER — METHYLPREDNISOLONE 4 MG/1
TABLET ORAL
Qty: 21 EACH | Refills: 0 | Status: SHIPPED | OUTPATIENT
Start: 2024-02-12 | End: 2024-03-12 | Stop reason: SDUPTHER

## 2024-02-12 NOTE — TELEPHONE ENCOUNTER
Spoke with Adriana, c/o stiffness in wrists and hands.  Nerves look knotted up, believe she is experiencing an RA flare.  X2 days.  States she is taking tylenol but does not relieve pain.  Has tramadol but makes her sick to her stomach.  Please advise.     [facility]     PRINTED

## 2024-02-12 NOTE — TELEPHONE ENCOUNTER
The patient's prescription has been approved and sent to   [facility] #[id number] - [place], [place] - [address] AT [facility] [address] [facility]  [address]  [place] [address]  Phone: [phone] Fax: [phone]

## 2024-02-12 NOTE — TELEPHONE ENCOUNTER
----- Message from Yamile Garcia sent at 2/12/2024 12:17 PM CST -----  Pt daughter is calling and is having an arthritis flare up. Her knuckles are swollen and hand is hard to move. They have not been able to get into a rheumatologist   [phone] pedro

## 2024-02-28 DIAGNOSIS — Z96.652 S/P TOTAL KNEE REPLACEMENT, LEFT: Primary | ICD-10-CM

## 2024-03-12 ENCOUNTER — PATIENT MESSAGE (OUTPATIENT)
Dept: ADMINISTRATIVE | Facility: HOSPITAL | Age: 74
End: 2024-03-12
Payer: COMMERCIAL

## 2024-03-12 RX ORDER — METHYLPREDNISOLONE 4 MG/1
TABLET ORAL
Qty: 21 EACH | Refills: 0 | Status: SHIPPED | OUTPATIENT
Start: 2024-03-12 | End: 2024-04-02

## 2024-03-12 NOTE — TELEPHONE ENCOUNTER
----- Message from Sheyla Ching sent at 3/12/2024 11:16 AM CDT -----  Pt needs to be seen asap. Stiffness in her hands and knee. Pt #[phone]

## 2024-03-12 NOTE — TELEPHONE ENCOUNTER
prescription sent to   [facility][id number] - [place] - [address] [facility] [facility]  [address]  [place] [address]  Phone: [phone] Fax: [phone]

## 2024-03-12 NOTE — TELEPHONE ENCOUNTER
Spoke to pts daughter and she stated pt has been having arthritis flare ups in her hands and knees and Pt was prescribed prednisone by Dr. Mccord last week a 5 day supply. Pts daughter wants to know if it can be sent in again

## 2024-03-28 ENCOUNTER — PATIENT MESSAGE (OUTPATIENT)
Dept: ADMINISTRATIVE | Facility: HOSPITAL | Age: 74
End: 2024-03-28
Payer: COMMERCIAL

## 2024-04-16 ENCOUNTER — TELEPHONE (OUTPATIENT)
Dept: FAMILY MEDICINE | Facility: CLINIC | Age: 74
End: 2024-04-16
Payer: COMMERCIAL

## 2024-04-16 NOTE — TELEPHONE ENCOUNTER
----- Message from Vianey Chavez sent at 4/16/2024 10:54 AM CDT -----  Contact: adriana  Daughter Adriana calling about pt being referred to a rheumatologist. Unable to find the paper work.   [phone]

## 2024-04-17 ENCOUNTER — TELEPHONE (OUTPATIENT)
Dept: FAMILY MEDICINE | Facility: CLINIC | Age: 74
End: 2024-04-17
Payer: COMMERCIAL

## 2024-04-17 DIAGNOSIS — M05.79 RHEUMATOID ARTHRITIS INVOLVING MULTIPLE SITES WITH POSITIVE RHEUMATOID FACTOR: Primary | ICD-10-CM

## 2024-04-17 NOTE — TELEPHONE ENCOUNTER
Spoke with pt daughter. They  would like referral to Yohann[facility].     Referral pended for review.

## 2024-04-17 NOTE — TELEPHONE ENCOUNTER
----- Message from Vianey Chavez sent at 4/17/2024  3:13 PM CDT -----  Pt states she has found a rheumatologist that accepts her insurance.   [phone]

## 2024-04-18 ENCOUNTER — TELEPHONE (OUTPATIENT)
Dept: FAMILY MEDICINE | Facility: CLINIC | Age: 74
End: 2024-04-18
Payer: COMMERCIAL

## 2024-04-18 NOTE — TELEPHONE ENCOUNTER
----- Message from Kinsey Erazo sent at 4/18/2024  2:52 PM CDT -----  Yara from [facility] called.They received the referral we sent to them.  Dr. Hagan  needs her last labs faxed # [phone] phone # [phone] GH

## 2024-04-19 ENCOUNTER — PATIENT MESSAGE (OUTPATIENT)
Dept: ADMINISTRATIVE | Facility: HOSPITAL | Age: 74
End: 2024-04-19
Payer: COMMERCIAL

## 2024-05-23 ENCOUNTER — PATIENT OUTREACH (OUTPATIENT)
Dept: ADMINISTRATIVE | Facility: HOSPITAL | Age: 74
End: 2024-05-23
Payer: COMMERCIAL

## 2024-05-23 DIAGNOSIS — Z12.31 ENCOUNTER FOR SCREENING MAMMOGRAM FOR BREAST CANCER: Primary | ICD-10-CM

## 2024-05-23 NOTE — PROGRESS NOTES
Population Health Chart Review & Patient Outreach Details      Additional [facility] Notes:    Called regarding overdue mammogram and other HM appts. Spoke with pt daughter , stating her mother will schedule at a later time.           Updates Requested / Reviewed:      Updated Care Coordination Note, Care Everywhere, , External Sources: DIS and Provation, Removed  or Duplicate Orders, and Immunizations Reconciliation Completed or Queried: [facility] Topics Overdue:      VBHM Score: 3     Colon Cancer Screening  Osteoporosis Screening  Mammogram    Tetanus Vaccine  Shingles/Zoster Vaccine  RSV Vaccine                  Health Maintenance Topic(s) Outreach Outcomes & Actions Taken:    Breast Cancer Screening - Outreach Outcomes & Actions Taken  : Mammogram Order Placed and Pt Will Schedule with External Provider / Order Routed & Care Team Updated if Applicable    Colorectal Cancer Screening - Outreach Outcomes & Actions Taken  : Pt Will Schedule with External Provider / Order Routed & Care Team Updated if Applicable    Osteoporosis Screening - Outreach Outcomes & Actions Taken  : Patient Will Schedule with External Provider / Order Routed & Care Team Updated if Applicable

## 2024-05-24 DIAGNOSIS — I10 ESSENTIAL HYPERTENSION: ICD-10-CM

## 2024-05-24 NOTE — TELEPHONE ENCOUNTER
----- Message from Vianey Chavez sent at 5/24/2024 11:29 AM CDT -----  Pt states the pharmacy is telling pt she no longer takes valsartan.   [facility]   [phone]

## 2024-05-27 RX ORDER — VALSARTAN 80 MG/1
80 TABLET ORAL DAILY
Qty: 90 TABLET | Refills: 3 | Status: SHIPPED | OUTPATIENT
Start: 2024-05-27 | End: 2025-05-27

## 2024-05-27 NOTE — TELEPHONE ENCOUNTER
The patient's prescription has been approved and sent to   [facility] #[id number] - [place], [place] - [address] [facility] [address] [facility]  [address]  [place] [address]  Phone: [phone] Fax: [phone]

## 2024-06-05 DIAGNOSIS — R39.14 FEELING OF INCOMPLETE BLADDER EMPTYING: Primary | ICD-10-CM

## 2024-06-05 RX ORDER — PROMETHAZINE HYDROCHLORIDE 6.25 MG/5ML
6.25 SYRUP ORAL EVERY 4 HOURS PRN
Qty: 120 ML | Refills: 0 | Status: SHIPPED | OUTPATIENT
Start: 2024-06-05

## 2024-06-05 NOTE — TELEPHONE ENCOUNTER
----- Message from Vianey Chavez sent at 6/5/2024  2:02 PM CDT -----  Pt states the urologist told her she needs a referral to be able to receive an appointment.   [phone]

## 2024-06-05 NOTE — TELEPHONE ENCOUNTER
Spoke with patients daughter states patient needs referral to urology, unable to completely empty bladder.  No MD preference, just requests to stay in [place].   Also requesting refill on cough syrup, c/o cough x1 week.   Taking allergy medication daily, but cough worsens at night.      Referral pended, refill pended.  To MD for approval -LANDON

## 2024-06-05 NOTE — TELEPHONE ENCOUNTER
Pt referred to   Adriane Wallace MD  [address] [place] [address] [address]  [address]  [facility] [address]  Phone: [phone]  Fax: [phone]

## 2024-06-05 NOTE — TELEPHONE ENCOUNTER
Spoke with patients daughter pedro, notified of refill and referral information given.  Informed pedro to return call with questions/concerns -DN

## 2024-07-01 DIAGNOSIS — E03.9 HYPOTHYROIDISM (ACQUIRED): ICD-10-CM

## 2024-07-01 DIAGNOSIS — I10 ESSENTIAL HYPERTENSION: ICD-10-CM

## 2024-07-01 DIAGNOSIS — E09.9 STEROID-INDUCED DIABETES MELLITUS, SEQUELA: ICD-10-CM

## 2024-07-01 DIAGNOSIS — E78.2 MIXED HYPERLIPIDEMIA: ICD-10-CM

## 2024-07-01 DIAGNOSIS — K21.9 GASTROESOPHAGEAL REFLUX DISEASE WITHOUT ESOPHAGITIS: ICD-10-CM

## 2024-07-01 DIAGNOSIS — T38.0X5S STEROID-INDUCED DIABETES MELLITUS, SEQUELA: ICD-10-CM

## 2024-07-01 DIAGNOSIS — M10.9 GOUT, UNSPECIFIED CAUSE, UNSPECIFIED CHRONICITY, UNSPECIFIED SITE: Primary | ICD-10-CM

## 2024-07-01 NOTE — TELEPHONE ENCOUNTER
----- Message from Melina Lim MA sent at 7/1/2024 11:48 AM CDT -----  Pt is requesting refills on tramadoL,omeprazole,levothyroxine and pravastatin please send to [facility] on [address]      Pt # [phone]

## 2024-07-02 RX ORDER — PRAVASTATIN SODIUM 40 MG/1
40 TABLET ORAL DAILY
Qty: 90 TABLET | Refills: 0 | Status: SHIPPED | OUTPATIENT
Start: 2024-07-02 | End: 2025-07-02

## 2024-07-02 RX ORDER — TRAMADOL HYDROCHLORIDE 50 MG/1
50 TABLET ORAL EVERY 6 HOURS PRN
Qty: 90 TABLET | Refills: 0 | Status: SHIPPED | OUTPATIENT
Start: 2024-07-02

## 2024-07-02 RX ORDER — OMEPRAZOLE 40 MG/1
40 CAPSULE, DELAYED RELEASE ORAL DAILY
Qty: 90 CAPSULE | Refills: 0 | Status: SHIPPED | OUTPATIENT
Start: 2024-07-02

## 2024-07-02 RX ORDER — LEVOTHYROXINE SODIUM 150 UG/1
150 TABLET ORAL
Qty: 90 TABLET | Refills: 0 | Status: SHIPPED | OUTPATIENT
Start: 2024-07-02

## 2024-07-08 ENCOUNTER — TELEPHONE (OUTPATIENT)
Dept: FAMILY MEDICINE | Facility: CLINIC | Age: 74
End: 2024-07-08
Payer: MEDICARE

## 2024-07-08 NOTE — TELEPHONE ENCOUNTER
----- Message from Sheyla Ching sent at 7/8/2024  2:24 PM CDT -----  Contact: Adriana  Refill for atorvastatin, levothyroxine, tramadol, omeprazole, [facility]apentin[facility]. Adriana @[phone]

## 2024-07-09 DIAGNOSIS — K21.9 GASTROESOPHAGEAL REFLUX DISEASE WITHOUT ESOPHAGITIS: ICD-10-CM

## 2024-07-09 DIAGNOSIS — E03.9 HYPOTHYROIDISM (ACQUIRED): ICD-10-CM

## 2024-07-09 RX ORDER — OMEPRAZOLE 40 MG/1
40 CAPSULE, DELAYED RELEASE ORAL DAILY
Qty: 90 CAPSULE | Refills: 0 | Status: SHIPPED | OUTPATIENT
Start: 2024-07-09

## 2024-07-09 RX ORDER — TRAMADOL HYDROCHLORIDE 50 MG/1
50 TABLET ORAL EVERY 6 HOURS PRN
Qty: 90 TABLET | Refills: 0 | Status: SHIPPED | OUTPATIENT
Start: 2024-07-09

## 2024-07-09 RX ORDER — LEVOTHYROXINE SODIUM 150 UG/1
150 TABLET ORAL
Qty: 90 TABLET | Refills: 0 | Status: SHIPPED | OUTPATIENT
Start: 2024-07-09

## 2024-07-09 RX ORDER — IBUPROFEN 600 MG/1
600 TABLET ORAL 3 TIMES DAILY
Qty: 90 TABLET | Refills: 0 | Status: SHIPPED | OUTPATIENT
Start: 2024-07-09

## 2024-07-09 NOTE — TELEPHONE ENCOUNTER
----- Message from Zohreh Vargas LPN sent at 7/8/2024  8:02 AM CDT -----    ----- Message -----  From: Josh Mccord MD  Sent: 7/8/2024  12:00 AM CDT  To: Josh Mccord Staff    Remind patient to get labs prior to upcoming appointment

## 2024-07-09 NOTE — TELEPHONE ENCOUNTER
Spoke with daughter pedro reminder given.  Requests to have rxs sent to [facility], currently unable to access refills with [facility]

## 2024-07-22 DIAGNOSIS — M54.16 LUMBAR RADICULOPATHY: Primary | ICD-10-CM

## 2024-07-22 NOTE — TELEPHONE ENCOUNTER
----- Message from Kinsey Erazo sent at 7/22/2024  3:29 PM CDT -----  [facility] [address]  The Tramadol is not covered by the patients insurance anymore. Can you call in something else.Adriana her daughters # [phone]

## 2024-07-23 RX ORDER — GABAPENTIN 300 MG/1
300 CAPSULE ORAL 2 TIMES DAILY
Qty: 60 CAPSULE | Refills: 0 | Status: SHIPPED | OUTPATIENT
Start: 2024-07-23 | End: 2025-07-23

## 2024-07-23 NOTE — TELEPHONE ENCOUNTER
The patient not seen in more than 12 months.  She has no showed to appointments in one-week.  She has been informed of the need for visit and lab work.  No further medications will be provided until seen with labs

## 2024-07-25 ENCOUNTER — TELEPHONE (OUTPATIENT)
Dept: UROLOGY | Facility: CLINIC | Age: 74
End: 2024-07-25
Payer: MEDICARE

## 2024-07-25 NOTE — TELEPHONE ENCOUNTER
Called and spoke with patient's daughter, inquired will the patient be coming to scheduled appt this afternoon. She stated the patient has been having flu symptoms the past 2-3 days, so she will cancel and call back when ready to reschedule.

## 2024-07-30 DIAGNOSIS — Z00.00 ENCOUNTER FOR MEDICARE ANNUAL WELLNESS EXAM: ICD-10-CM

## 2024-08-14 ENCOUNTER — TELEPHONE (OUTPATIENT)
Dept: FAMILY MEDICINE | Facility: CLINIC | Age: 74
End: 2024-08-14
Payer: MEDICARE

## 2024-08-15 NOTE — TELEPHONE ENCOUNTER
Spoke with patient notified fasting lab orders available at [facility].  Patient instructed to complete labs one week prior to visit.  Informed patient to return call with questions/concerns.  Patient verbalized understanding to all -DN

## 2024-08-23 ENCOUNTER — TELEPHONE (OUTPATIENT)
Dept: FAMILY MEDICINE | Facility: CLINIC | Age: 74
End: 2024-08-23
Payer: MEDICARE

## 2024-08-23 NOTE — TELEPHONE ENCOUNTER
Per erin lantigua as ID is in our computer system.  Adriana notified, verbalized understanding -DN

## 2024-08-23 NOTE — TELEPHONE ENCOUNTER
----- Message from Vianey Chavez sent at 8/23/2024 10:29 AM CDT -----  Would pt be able to have her labs done without her ID?  [phone]

## 2024-08-26 ENCOUNTER — TELEPHONE (OUTPATIENT)
Dept: FAMILY MEDICINE | Facility: CLINIC | Age: 74
End: 2024-08-26
Payer: MEDICARE

## 2024-08-26 NOTE — TELEPHONE ENCOUNTER
----- Message from [facility], RT sent at 8/26/2024  9:24 AM CDT -----  RBC=3.21. ov 8/28

## 2024-08-28 ENCOUNTER — OFFICE VISIT (OUTPATIENT)
Dept: FAMILY MEDICINE | Facility: CLINIC | Age: 74
End: 2024-08-28
Payer: MEDICARE

## 2024-08-28 VITALS
HEIGHT: 61 IN | OXYGEN SATURATION: 99 % | HEART RATE: 62 BPM | BODY MASS INDEX: 27.19 KG/M2 | WEIGHT: 144 LBS | SYSTOLIC BLOOD PRESSURE: 108 MMHG | DIASTOLIC BLOOD PRESSURE: 64 MMHG

## 2024-08-28 DIAGNOSIS — M67.431 GANGLION, RIGHT WRIST: ICD-10-CM

## 2024-08-28 DIAGNOSIS — E78.2 MIXED HYPERLIPIDEMIA: ICD-10-CM

## 2024-08-28 DIAGNOSIS — M54.16 LUMBAR RADICULOPATHY: ICD-10-CM

## 2024-08-28 DIAGNOSIS — Z79.899 DRUG-INDUCED IMMUNODEFICIENCY: ICD-10-CM

## 2024-08-28 DIAGNOSIS — K21.9 GASTROESOPHAGEAL REFLUX DISEASE WITHOUT ESOPHAGITIS: ICD-10-CM

## 2024-08-28 DIAGNOSIS — M10.9 GOUT, UNSPECIFIED CAUSE, UNSPECIFIED CHRONICITY, UNSPECIFIED SITE: ICD-10-CM

## 2024-08-28 DIAGNOSIS — D84.821 DRUG-INDUCED IMMUNODEFICIENCY: ICD-10-CM

## 2024-08-28 DIAGNOSIS — I10 ESSENTIAL HYPERTENSION: Primary | ICD-10-CM

## 2024-08-28 DIAGNOSIS — M05.79 RHEUMATOID ARTHRITIS INVOLVING MULTIPLE SITES WITH POSITIVE RHEUMATOID FACTOR: ICD-10-CM

## 2024-08-28 DIAGNOSIS — E03.9 HYPOTHYROIDISM (ACQUIRED): ICD-10-CM

## 2024-08-28 PROBLEM — E11.59 TYPE 2 DIABETES MELLITUS WITH OTHER CIRCULATORY COMPLICATIONS: Status: RESOLVED | Noted: 2020-01-16 | Resolved: 2024-08-28

## 2024-08-28 PROCEDURE — 20605 DRAIN/INJ JOINT/BURSA W/O US: CPT | Mod: RT,S$GLB,, | Performed by: FAMILY MEDICINE

## 2024-08-28 PROCEDURE — 4010F ACE/ARB THERAPY RXD/TAKEN: CPT | Mod: CPTII,S$GLB,, | Performed by: FAMILY MEDICINE

## 2024-08-28 PROCEDURE — 1159F MED LIST DOCD IN RCRD: CPT | Mod: CPTII,S$GLB,, | Performed by: FAMILY MEDICINE

## 2024-08-28 PROCEDURE — 3074F SYST BP LT 130 MM HG: CPT | Mod: CPTII,S$GLB,, | Performed by: FAMILY MEDICINE

## 2024-08-28 PROCEDURE — 1101F PT FALLS ASSESS-DOCD LE1/YR: CPT | Mod: CPTII,S$GLB,, | Performed by: FAMILY MEDICINE

## 2024-08-28 PROCEDURE — 3044F HG A1C LEVEL LT 7.0%: CPT | Mod: CPTII,S$GLB,, | Performed by: FAMILY MEDICINE

## 2024-08-28 PROCEDURE — 3288F FALL RISK ASSESSMENT DOCD: CPT | Mod: CPTII,S$GLB,, | Performed by: FAMILY MEDICINE

## 2024-08-28 PROCEDURE — 3078F DIAST BP <80 MM HG: CPT | Mod: CPTII,S$GLB,, | Performed by: FAMILY MEDICINE

## 2024-08-28 PROCEDURE — 99214 OFFICE O/P EST MOD 30 MIN: CPT | Mod: 25,S$GLB,, | Performed by: FAMILY MEDICINE

## 2024-08-28 RX ORDER — ALLOPURINOL 300 MG/1
300 TABLET ORAL DAILY
Qty: 90 TABLET | Refills: 3 | Status: SHIPPED | OUTPATIENT
Start: 2024-08-28

## 2024-08-28 RX ORDER — TRAMADOL HYDROCHLORIDE 50 MG/1
50 TABLET ORAL EVERY 6 HOURS PRN
Qty: 90 TABLET | Refills: 1 | Status: SHIPPED | OUTPATIENT
Start: 2024-08-28

## 2024-08-28 RX ORDER — PRAVASTATIN SODIUM 40 MG/1
40 TABLET ORAL DAILY
Qty: 90 TABLET | Refills: 3 | Status: SHIPPED | OUTPATIENT
Start: 2024-08-28 | End: 2025-08-28

## 2024-08-28 RX ORDER — OMEPRAZOLE 40 MG/1
40 CAPSULE, DELAYED RELEASE ORAL DAILY
Qty: 90 CAPSULE | Refills: 3 | Status: SHIPPED | OUTPATIENT
Start: 2024-08-28

## 2024-08-28 RX ORDER — CARVEDILOL 3.12 MG/1
TABLET ORAL
Qty: 180 TABLET | Refills: 3 | Status: SHIPPED | OUTPATIENT
Start: 2024-08-28

## 2024-08-28 RX ORDER — METHOTREXATE 2.5 MG/1
20 TABLET ORAL
Qty: 32 TABLET | Refills: 5 | Status: SHIPPED | OUTPATIENT
Start: 2024-08-30

## 2024-08-28 RX ORDER — GABAPENTIN 300 MG/1
300 CAPSULE ORAL 2 TIMES DAILY
Qty: 180 CAPSULE | Refills: 3 | Status: SHIPPED | OUTPATIENT
Start: 2024-08-28 | End: 2025-08-28

## 2024-08-28 RX ORDER — LEVOTHYROXINE SODIUM 150 UG/1
150 TABLET ORAL
Qty: 90 TABLET | Refills: 1 | Status: SHIPPED | OUTPATIENT
Start: 2024-08-28

## 2024-08-28 NOTE — PROCEDURES
Intermediate Joint Aspiration/Injection    Date/Time: 8/28/2024 3:20 PM    Performed by: Josh Mccord MD  Authorized by: Josh Mccord MD    Consent Done?:  Yes (Verbal)  Indications:  Arthritis (ganglion cyst)    Location:  Wrist  Ultrasonic Guidance for needle placement: No  Needle size:  18 G  Approach:  Dorsal  Aspirate amount (ml):  0  Patient tolerance:  Patient tolerated the procedure well with no immediate complications       Additional Comments: Unsuccessful tap of 3 cm ganglion cyst.  Bandage applied and compression.  No bleeding.

## 2024-08-28 NOTE — PROGRESS NOTES
SUBJECTIVE:   HPI: Lizette Palafox  is a 74 y.o. female who presents for regular visit   HTN / Thyroid Check Up  /  Lab Review and Growing Lump to R Wrist xWeeks (-painful = 8/10 now)    Patient hypertension, hypothyroidism and rheumatoid arthritis is in for visit.  Last visit with me greater than a year ago.  She has maintained her medications.  Some of them high-risk including methotrexate.  Labs are performed and show a macrocytic anemia.  This has been persistent.  She does report taking folic acid.  Thyroid normal has is metabolic panel and TSH.    She feels that she has been doing fine medicines.  She has had a recent loss in her family that caused her to miss an appointment.  Uric acid is in the low-normal range and allopurinol has been reduced.  She has had no flares and no active rheumatoid flares.  She does continue on tramadol 50 mg once a day for joint pain.    She has noted a 2 week history of a cyst on her right wrist.  Reports his painful.  Denies trauma.  Denies any redness or inflammation.          Refill on 07/01/2024   Component Date Value Ref Range Status    TSH 08/23/2024 2.26  0.40 - 4.50 mIU/L Final    T4, Free 08/23/2024 1.4  0.8 - 1.8 ng/dL Final    WBC 08/23/2024 6.9  3.8 - 10.8 Thousand/uL Final    RBC 08/23/2024 3.21 (L)  3.80 - 5.10 Million/uL Final    Hemoglobin 08/23/2024 11.2 (L)  11.7 - 15.5 g/dL Final    Hematocrit 08/23/2024 33.7 (L)  35.0 - 45.0 % Final    MCV 08/23/2024 105.0 (H)  80.0 - 100.0 fL Final    MCH 08/23/2024 34.9 (H)  27.0 - 33.0 pg Final    MCHC 08/23/2024 33.2  32.0 - 36.0 g/dL Final    RDW 08/23/2024 13.5  11.0 - 15.0 % Final    Platelets 08/23/2024 276  140 - 400 Thousand/uL Final    MPV 08/23/2024 9.6  7.5 - 12.5 fL Final    Neutrophils, Abs 08/23/2024 4,540  1,500 - 7,800 cells/uL Final    Lymph # 08/23/2024 1,815  850 - 3,900 cells/uL Final    Mono # 08/23/2024 200  200 - 950 cells/uL Final    Eos # 08/23/2024 283  15 - 500 cells/uL Final    Baso #  08/23/2024 62  0 - 200 cells/uL Final    Neutrophils Relative 08/23/2024 65.8  % Final    Lymph % 08/23/2024 26.3  % Final    Mono % 08/23/2024 2.9  % Final    Eosinophil % 08/23/2024 4.1  % Final    Basophil % 08/23/2024 0.9  % Final    Glucose 08/23/2024 86  65 - 99 mg/dL Final    BUN 08/23/2024 24  7 - 25 mg/dL Final    Creatinine 08/23/2024 0.80  0.60 - 1.00 mg/dL Final    eGFR 08/23/2024 77  > OR = 60 mL/min/1.73m2 Final    BUN/Creatinine Ratio 08/23/2024 SEE NOTE:  6 - 22 (calc) Final    Sodium 08/23/2024 140  135 - 146 mmol/L Final    Potassium 08/23/2024 4.2  3.5 - 5.3 mmol/L Final    Chloride 08/23/2024 108  98 - 110 mmol/L Final    CO2 08/23/2024 22  20 - 32 mmol/L Final    Calcium 08/23/2024 9.6  8.6 - 10.4 mg/dL Final    Total Protein 08/23/2024 6.7  6.1 - 8.1 g/dL Final    Albumin 08/23/2024 3.9  3.6 - 5.1 g/dL Final    Globulin, Total 08/23/2024 2.8  1.9 - 3.7 g/dL (calc) Final    Albumin/Globulin Ratio 08/23/2024 1.4  1.0 - 2.5 (calc) Final    Total Bilirubin 08/23/2024 0.6  0.2 - 1.2 mg/dL Final    Alkaline Phosphatase 08/23/2024 81  37 - 153 U/L Final    AST 08/23/2024 22  10 - 35 U/L Final    ALT 08/23/2024 12  6 - 29 U/L Final    Cholesterol 08/23/2024 156  <200 mg/dL Final    HDL 08/23/2024 60  > OR = 50 mg/dL Final    Triglycerides 08/23/2024 123  <150 mg/dL Final    LDL Cholesterol 08/23/2024 76  mg/dL (calc) Final    HDL/Cholesterol Ratio 08/23/2024 2.6  <5.0 (calc) Final    Non HDL Chol. (LDL+VLDL) 08/23/2024 96  <130 mg/dL (calc) Final    Uric Acid 08/23/2024 2.3 (L)  2.5 - 7.0 mg/dL Final    Hemoglobin A1C 08/23/2024 5.1  <5.7 % of total Hgb Final      (Not in a hospital admission)    Review of patient's allergies indicates:   Allergen Reactions    Levaquin [levofloxacin] Hives and Edema    Tramadol Nausea And Vomiting     1/2 tablet not bothersome     Current Outpatient Medications on File Prior to Visit   Medication Sig Dispense Refill    aspirin (ECOTRIN) 81 MG EC tablet Take 1 tablet  (81 mg total) by mouth once daily. 60 tablet 0    diclofenac sodium (VOLTAREN) 1 % Gel Apply 2 g topically 2 (two) times daily as needed (hand and wrist pain). 150 g 1    docusate sodium (COLACE) 100 MG capsule Take 1 capsule (100 mg total) by mouth 2 (two) times daily as needed for Constipation. 45 capsule 0    ferrous sulfate (IRON ORAL) Take 1 tablet by mouth once daily.      folic acid (FOLVITE) 1 MG tablet Take 1 mg by mouth once daily.      ibuprofen (ADVIL,MOTRIN) 600 MG tablet Take 1 tablet (600 mg total) by mouth 3 (three) times daily. 90 tablet 0    ketoconazole (NIZORAL) 2 % cream Apply topically once daily.      ondansetron (ZOFRAN-ODT) 4 MG TbDL Take 1 tablet (4 mg total) by mouth every 8 (eight) hours as needed (nausea). 30 tablet 0    triamcinolone acetonide 0.025% (KENALOG) 0.025 % cream Apply a thin layer to affected areas 1-2 times a day when having a flare up 80 g 4    valsartan (DIOVAN) 80 MG tablet Take 1 tablet (80 mg total) by mouth once daily. 90 tablet 3    vitamin D (VITAMIN D3) 1000 units Tab Take 2,000 Units by mouth once daily.       [DISCONTINUED] allopurinoL (ZYLOPRIM) 300 MG tablet Take 1 tablet (300 mg total) by mouth 2 (two) times daily. For gout prevention 180 tablet 1    [DISCONTINUED] aspirin 81 MG Chew Take 81 mg by mouth once daily.       [DISCONTINUED] carvediloL (COREG) 3.125 MG tablet TAKE 1 TABLET(3.125 MG) BY MOUTH TWICE DAILY WITH MEALS 180 tablet 3    [DISCONTINUED] gabapentin (NEURONTIN) 300 MG capsule Take 1 capsule (300 mg total) by mouth 2 (two) times daily. 60 capsule 0    [DISCONTINUED] levothyroxine (SYNTHROID) 150 MCG tablet Take 1 tablet (150 mcg total) by mouth before breakfast. For thryoid 90 tablet 0    [DISCONTINUED] methotrexate 2.5 MG Tab Take 8 tablets (20 mg total) by mouth Every Friday. 32 tablet 5    [DISCONTINUED] omeprazole (PRILOSEC) 40 MG capsule Take 1 capsule (40 mg total) by mouth once daily. For heartburn 90 capsule 0    [DISCONTINUED]  oxyCODONE-acetaminophen (PERCOCET) 5-325 mg per tablet Take 1 tablet by mouth every 6 (six) hours as needed for Pain. 28 tablet 0    [DISCONTINUED] pravastatin (PRAVACHOL) 40 MG tablet Take 1 tablet (40 mg total) by mouth once daily. 90 tablet 0    [DISCONTINUED] promethazine (PHENERGAN) 6.25 mg/5 mL syrup Take 5 mLs (6.25 mg total) by mouth every 4 (four) hours as needed (cough). 120 mL 0    [DISCONTINUED] traMADoL (ULTRAM) 50 mg tablet Take 1 tablet (50 mg total) by mouth every 6 (six) hours as needed for Pain. 90 tablet 0     Current Facility-Administered Medications on File Prior to Visit   Medication Dose Route Frequency Provider Last Rate Last Admin    electrolyte-S (ISOLYTE)   Intravenous Continuous Shan Figueroa MD 10 mL/hr at 10/05/23 0847 Restarted at 10/05/23 1238    electrolyte-S (ISOLYTE)   Intravenous Continuous Shan Figueroa MD   Stopped at 10/05/23 1447    fentaNYL 50 mcg/mL injection 25 mcg  25 mcg Intravenous Q5 Min PRN Shan Figueroa MD        LIDOcaine (PF) 10 mg/ml (1%) injection 10 mg  1 mL Intradermal Once Shan Figueroa MD        metoclopramide HCl injection 10 mg  10 mg Intravenous Q10 Min PRN Shan Figueroa MD        oxyCODONE immediate release tablet 5 mg  5 mg Oral Q3H PRN Shan Figueroa MD         Past Medical History:   Diagnosis Date    Anemia, unspecified 2021    Arthritis     Rheumatoid    Encounter for blood transfusion     GERD (gastroesophageal reflux disease)     Gout     Hypertension     Macrocytosis 2021    Personal history of colonic polyps 2012    Prediabetes     Thyroid disease     Transfusion reaction     Type 2 diabetes mellitus with other circulatory complications 2020     Past Surgical History:   Procedure Laterality Date    APPENDECTOMY       SECTION      x3    DILATION AND CURETTAGE OF UTERUS      GALLBLADDER SURGERY      HYSTERECTOMY      ROBOTIC ARTHROPLASTY, KNEE Left 10/5/2023    Procedure: ROBOTIC ARTHROPLASTY,  KNEE, TOTAL;  Surgeon: Cornelio Cota MD;  Location: [facility];  Service: Orthopedics;  Laterality: Left;    TOTAL HIP ARTHROPLASTY       Family History   Problem Relation Name Age of Onset    No Known Problems Mother      No Known Problems Father       Social History     Tobacco Use    Smoking status: Never    Smokeless tobacco: Never   Substance Use Topics    Alcohol use: Not Currently    Drug use: Never      Health Maintenance Topics with due status: Not Due       Topic Last Completion Date    Influenza Vaccine 11/30/2022    Lipid Panel 08/23/2024     Immunization History   Administered Date(s) Administered    Influenza - Quadrivalent - High Dose - PF (65 years and older) 09/24/2020, 11/18/2021, 11/30/2022    Influenza - Quadrivalent - PF *Preferred* (6 months and older) 10/26/2018    Pneumococcal Conjugate - 13 Valent 10/21/2016    Pneumococcal Polysaccharide - 23 Valent 10/26/2018       Review of Systems   Constitutional:  Negative for activity change, fatigue and unexpected weight change.   HENT:  Negative for hearing loss, postnasal drip, sinus pressure, sore throat and voice change.    Eyes:  Negative for photophobia and visual disturbance.   Respiratory:  Negative for cough, shortness of breath and wheezing.    Cardiovascular:  Negative for chest pain and palpitations.   Gastrointestinal:  Negative for constipation, diarrhea and nausea.   Genitourinary:  Negative for difficulty urinating, frequency, hematuria and urgency.   Musculoskeletal:  Positive for arthralgias. Negative for back pain.   Skin:  Negative for rash.   Neurological:  Negative for weakness, light-headedness and headaches.   Hematological:  Negative for adenopathy. Does not bruise/bleed easily.   Psychiatric/Behavioral:  The patient is not nervous/anxious.       OBJECTIVE:          12/29/2023    10:37 AM 8/28/2024     3:21 PM   Vitals - 1 value per visit   SYSTOLIC  108   DIASTOLIC  64   Pulse  62   SPO2  99 %   Weight (lb) 160 144  "  Weight (kg) 72.576 65.318   Height 5' 1" (1.549 m) 5' 1" (1.549 m)   BMI (Calculated) 30.2 27.2      Physical Exam  Constitutional:       Appearance: Normal appearance.      Comments: Pleasant elderly female ambulating with a Rollator   HENT:      Head: Normocephalic and atraumatic.      Mouth/Throat:      Mouth: Mucous membranes are moist.   Eyes:      Conjunctiva/sclera: Conjunctivae normal.   Cardiovascular:      Rate and Rhythm: Normal rate and regular rhythm.   Pulmonary:      Effort: Pulmonary effort is normal.   Musculoskeletal:         General: Deformity present.      Right lower leg: Edema present.      Left lower leg: No edema.   Skin:     General: Skin is warm and dry.          Neurological:      General: No focal deficit present.      Mental Status: She is alert and oriented to person, place, and time.      Gait: Gait abnormal.   Psychiatric:         Mood and Affect: Mood normal.         Behavior: Behavior normal.        Assessment:       1. Essential hypertension    2. Gout, unspecified cause, unspecified chronicity, unspecified site    3. Gastroesophageal reflux disease without esophagitis    4. Mixed hyperlipidemia    5. Lumbar radiculopathy    6. Hypothyroidism (acquired)    7. Rheumatoid arthritis involving multiple sites with positive rheumatoid factor    8. Ganglion, right wrist    9. Drug-induced immunodeficiency        Plan:       Essential hypertension  -     carvediloL (COREG) 3.125 MG tablet; TAKE 1 TABLET(3.125 MG) BY MOUTH TWICE DAILY WITH MEALS  Dispense: 180 tablet; Refill: 3    Gout, unspecified cause, unspecified chronicity, unspecified site  -     allopurinoL (ZYLOPRIM) 300 MG tablet; Take 1 tablet (300 mg total) by mouth once daily. For gout prevention  Dispense: 90 tablet; Refill: 3    Gastroesophageal reflux disease without esophagitis  -     omeprazole (PRILOSEC) 40 MG capsule; Take 1 capsule (40 mg total) by mouth once daily. For heartburn  Dispense: 90 capsule; Refill: " 3    Mixed hyperlipidemia  -     pravastatin (PRAVACHOL) 40 MG tablet; Take 1 tablet (40 mg total) by mouth once daily.  Dispense: 90 tablet; Refill: 3    Lumbar radiculopathy  -     gabapentin (NEURONTIN) 300 MG capsule; Take 1 capsule (300 mg total) by mouth 2 (two) times daily.  Dispense: 180 capsule; Refill: 3  -     traMADoL (ULTRAM) 50 mg tablet; Take 1 tablet (50 mg total) by mouth every 6 (six) hours as needed for Pain.  Dispense: 90 tablet; Refill: 1    Hypothyroidism (acquired)  -     levothyroxine (SYNTHROID) 150 MCG tablet; Take 1 tablet (150 mcg total) by mouth before breakfast. For thryoid  Dispense: 90 tablet; Refill: 1    Rheumatoid arthritis involving multiple sites with positive rheumatoid factor  -     methotrexate 2.5 MG Tab; Take 8 tablets (20 mg total) by mouth Every Friday.  Dispense: 32 tablet; Refill: 5  -     traMADoL (ULTRAM) 50 mg tablet; Take 1 tablet (50 mg total) by mouth every 6 (six) hours as needed for Pain.  Dispense: 90 tablet; Refill: 1    Ganglion, right wrist  -     Intermediate Joint Aspiration/Injection    Drug-induced immunodeficiency  Comments:  No issues reported.  Remain on methotrexate    Other orders  -     Cancel: Small Joint Aspiration/Injection      Medication List with Changes/Refills   Current Medications    ASPIRIN (ECOTRIN) 81 MG EC TABLET    Take 1 tablet (81 mg total) by mouth once daily.    DICLOFENAC SODIUM (VOLTAREN) 1 % GEL    Apply 2 g topically 2 (two) times daily as needed (hand and wrist pain).    DOCUSATE SODIUM (COLACE) 100 MG CAPSULE    Take 1 capsule (100 mg total) by mouth 2 (two) times daily as needed for Constipation.    FERROUS SULFATE (IRON ORAL)    Take 1 tablet by mouth once daily.    FOLIC ACID (FOLVITE) 1 MG TABLET    Take 1 mg by mouth once daily.    IBUPROFEN (ADVIL,MOTRIN) 600 MG TABLET    Take 1 tablet (600 mg total) by mouth 3 (three) times daily.    KETOCONAZOLE (NIZORAL) 2 % CREAM    Apply topically once daily.    ONDANSETRON  (ZOFRAN-ODT) 4 MG TBDL    Take 1 tablet (4 mg total) by mouth every 8 (eight) hours as needed (nausea).    TRIAMCINOLONE ACETONIDE 0.025% (KENALOG) 0.025 % CREAM    Apply a thin layer to affected areas 1-2 times a day when having a flare up    VALSARTAN (DIOVAN) 80 MG TABLET    Take 1 tablet (80 mg total) by mouth once daily.    VITAMIN D (VITAMIN D3) 1000 UNITS TAB    Take 2,000 Units by mouth once daily.    Changed and/or Refilled Medications    Modified Medication Previous Medication    ALLOPURINOL (ZYLOPRIM) 300 MG TABLET allopurinoL (ZYLOPRIM) 300 MG tablet       Take 1 tablet (300 mg total) by mouth once daily. For gout prevention    Take 1 tablet (300 mg total) by mouth 2 (two) times daily. For gout prevention    CARVEDILOL (COREG) 3.125 MG TABLET carvediloL (COREG) 3.125 MG tablet       TAKE 1 TABLET(3.125 MG) BY MOUTH TWICE DAILY WITH MEALS    TAKE 1 TABLET(3.125 MG) BY MOUTH TWICE DAILY WITH MEALS    GABAPENTIN (NEURONTIN) 300 MG CAPSULE gabapentin (NEURONTIN) 300 MG capsule       Take 1 capsule (300 mg total) by mouth 2 (two) times daily.    Take 1 capsule (300 mg total) by mouth 2 (two) times daily.    LEVOTHYROXINE (SYNTHROID) 150 MCG TABLET levothyroxine (SYNTHROID) 150 MCG tablet       Take 1 tablet (150 mcg total) by mouth before breakfast. For thryoid    Take 1 tablet (150 mcg total) by mouth before breakfast. For thryoid    METHOTREXATE 2.5 MG TAB methotrexate 2.5 MG Tab       Take 8 tablets (20 mg total) by mouth Every Friday.    Take 8 tablets (20 mg total) by mouth Every Friday.    OMEPRAZOLE (PRILOSEC) 40 MG CAPSULE omeprazole (PRILOSEC) 40 MG capsule       Take 1 capsule (40 mg total) by mouth once daily. For heartburn    Take 1 capsule (40 mg total) by mouth once daily. For heartburn    PRAVASTATIN (PRAVACHOL) 40 MG TABLET pravastatin (PRAVACHOL) 40 MG tablet       Take 1 tablet (40 mg total) by mouth once daily.    Take 1 tablet (40 mg total) by mouth once daily.    TRAMADOL (ULTRAM) 50 MG  TABLET traMADoL (ULTRAM) 50 mg tablet       Take 1 tablet (50 mg total) by mouth every 6 (six) hours as needed for Pain.    Take 1 tablet (50 mg total) by mouth every 6 (six) hours as needed for Pain.   Discontinued Medications    ASPIRIN 81 MG CHEW    Take 81 mg by mouth once daily.     OXYCODONE-ACETAMINOPHEN (PERCOCET) 5-325 MG PER TABLET    Take 1 tablet by mouth every 6 (six) hours as needed for Pain.    PROMETHAZINE (PHENERGAN) 6.25 MG/5 ML SYRUP    Take 5 mLs (6.25 mg total) by mouth every 4 (four) hours as needed (cough).        Counseled on age and gender appropriate medical preventative services, including cancer screenings, immunizations, overall nutritional health, need for a consistent exercise regimen and an overall push towards maintaining a vigorous and active lifestyle.      Follow up in about 6 months (around 2/28/2025).

## 2024-09-16 ENCOUNTER — TELEPHONE (OUTPATIENT)
Dept: FAMILY MEDICINE | Facility: CLINIC | Age: 74
End: 2024-09-16
Payer: MEDICARE

## 2024-09-16 NOTE — TELEPHONE ENCOUNTER
----- Message from Sheyla Ching sent at 9/16/2024  2:28 PM CDT -----  Contact: Adriana  Pt has been having stomach pain. Please advise. Adriana @[phone]

## 2024-09-16 NOTE — TELEPHONE ENCOUNTER
Spoke with patients daughter.  Patient with c/o stomach pains xAwhile.  States pains subsided prior to last visit, so patient did not mention while in office but pains have since returned.  Cramping pain and diarrhea with eating, states patient had half of a chicken taco today and could not finish due to pain.  Daughter adriana states patient is taking a lot of pepto bismol.  Visit scheduled for 9/17/24 at 9:20am with dr prater.  Adriana verbalized understanding to appt time/date -DN

## 2024-09-17 ENCOUNTER — OFFICE VISIT (OUTPATIENT)
Dept: FAMILY MEDICINE | Facility: CLINIC | Age: 74
End: 2024-09-17
Payer: MEDICARE

## 2024-09-17 VITALS
WEIGHT: 146 LBS | HEIGHT: 61 IN | OXYGEN SATURATION: 99 % | BODY MASS INDEX: 27.56 KG/M2 | HEART RATE: 52 BPM | SYSTOLIC BLOOD PRESSURE: 120 MMHG | DIASTOLIC BLOOD PRESSURE: 72 MMHG

## 2024-09-17 DIAGNOSIS — R10.13 EPIGASTRIC PAIN: Primary | ICD-10-CM

## 2024-09-17 DIAGNOSIS — M05.79 RHEUMATOID ARTHRITIS INVOLVING MULTIPLE SITES WITH POSITIVE RHEUMATOID FACTOR: ICD-10-CM

## 2024-09-17 DIAGNOSIS — M67.431 GANGLION, RIGHT WRIST: ICD-10-CM

## 2024-09-17 PROCEDURE — 1159F MED LIST DOCD IN RCRD: CPT | Mod: CPTII,S$GLB,, | Performed by: FAMILY MEDICINE

## 2024-09-17 PROCEDURE — 3288F FALL RISK ASSESSMENT DOCD: CPT | Mod: CPTII,S$GLB,, | Performed by: FAMILY MEDICINE

## 2024-09-17 PROCEDURE — 3074F SYST BP LT 130 MM HG: CPT | Mod: CPTII,S$GLB,, | Performed by: FAMILY MEDICINE

## 2024-09-17 PROCEDURE — 4010F ACE/ARB THERAPY RXD/TAKEN: CPT | Mod: CPTII,S$GLB,, | Performed by: FAMILY MEDICINE

## 2024-09-17 PROCEDURE — 3078F DIAST BP <80 MM HG: CPT | Mod: CPTII,S$GLB,, | Performed by: FAMILY MEDICINE

## 2024-09-17 PROCEDURE — 3044F HG A1C LEVEL LT 7.0%: CPT | Mod: CPTII,S$GLB,, | Performed by: FAMILY MEDICINE

## 2024-09-17 PROCEDURE — 99214 OFFICE O/P EST MOD 30 MIN: CPT | Mod: S$GLB,,, | Performed by: FAMILY MEDICINE

## 2024-09-17 PROCEDURE — 1101F PT FALLS ASSESS-DOCD LE1/YR: CPT | Mod: CPTII,S$GLB,, | Performed by: FAMILY MEDICINE

## 2024-09-17 RX ORDER — DICYCLOMINE HYDROCHLORIDE 10 MG/1
10 CAPSULE ORAL
Qty: 120 CAPSULE | Refills: 0 | Status: SHIPPED | OUTPATIENT
Start: 2024-09-17 | End: 2024-10-17

## 2024-09-17 NOTE — PROGRESS NOTES
SUBJECTIVE:    Patient ID: Lizette Palafox is a 74 y.o. female.    Chief Complaint: Intermittent Stomach Pain xWeeks (-pain with eating/-intermittent diarrhea) and lump on wrist growing larger    Patient with rheumatoid arthritis presents with several weeks history of midepigastric abdominal pain.  She reports she has pain after everything she eats.  Then she has cramping and has to have a bowel movement.  Bowel movements are diarrhea.  Her stools are dark secondary to daily iron usage.  She reports no change in the color.  She has had abdominal surgery in the past that involved removing her appendix.  She has had gallstones removed in the past but she is unsure if she had a cholecystectomy.  Appetite is decreased secondary to pain and cramping.  She is prescribed ibuprofen but reports only uses intermittently.  Mostly uses her tramadol for pain.    She was also seen recently for ganglion to her right wrist.  Attempted aspiration was unsuccessful.  Patient instructed to use compression.  Unfortunately the area has gotten larger and is causing discomfort        Past Medical History:   Diagnosis Date    Anemia, unspecified 2021    Arthritis     Rheumatoid    Encounter for blood transfusion     GERD (gastroesophageal reflux disease)     Gout     Hypertension     Macrocytosis 2021    Personal history of colonic polyps 2012    Prediabetes     Thyroid disease     Transfusion reaction     Type 2 diabetes mellitus with other circulatory complications 2020     Past Surgical History:   Procedure Laterality Date    APPENDECTOMY       SECTION      x3    DILATION AND CURETTAGE OF UTERUS      GALLBLADDER SURGERY      HYSTERECTOMY      ROBOTIC ARTHROPLASTY, KNEE Left 10/5/2023    Procedure: ROBOTIC ARTHROPLASTY, KNEE, TOTAL;  Surgeon: Cornelio Cota MD;  Location: [facility];  Service: Orthopedics;  Laterality: Left;    TOTAL HIP ARTHROPLASTY       Family History   Problem Relation Name Age of  Onset    No Known Problems Mother      No Known Problems Father         Marital Status:   Alcohol History:  reports that she does not currently use alcohol.  Tobacco History:  reports that she has never smoked. She has never used smokeless tobacco.  Drug History:  reports no history of drug use.    Review of patient's allergies indicates:   Allergen Reactions    Levaquin [levofloxacin] Hives and Edema    Tramadol Nausea And Vomiting     1/2 tablet not bothersome       Current Outpatient Medications:     allopurinoL (ZYLOPRIM) 300 MG tablet, Take 1 tablet (300 mg total) by mouth once daily. For gout prevention, Disp: 90 tablet, Rfl: 3    aspirin (ECOTRIN) 81 MG EC tablet, Take 1 tablet (81 mg total) by mouth once daily., Disp: 60 tablet, Rfl: 0    carvediloL (COREG) 3.125 MG tablet, TAKE 1 TABLET(3.125 MG) BY MOUTH TWICE DAILY WITH MEALS, Disp: 180 tablet, Rfl: 3    diclofenac sodium (VOLTAREN) 1 % Gel, Apply 2 g topically 2 (two) times daily as needed (hand and wrist pain)., Disp: 150 g, Rfl: 1    docusate sodium (COLACE) 100 MG capsule, Take 1 capsule (100 mg total) by mouth 2 (two) times daily as needed for Constipation., Disp: 45 capsule, Rfl: 0    ferrous sulfate (IRON ORAL), Take 1 tablet by mouth once daily., Disp: , Rfl:     folic acid (FOLVITE) 1 MG tablet, Take 1 mg by mouth once daily., Disp: , Rfl:     gabapentin (NEURONTIN) 300 MG capsule, Take 1 capsule (300 mg total) by mouth 2 (two) times daily., Disp: 180 capsule, Rfl: 3    ibuprofen (ADVIL,MOTRIN) 600 MG tablet, Take 1 tablet (600 mg total) by mouth 3 (three) times daily., Disp: 90 tablet, Rfl: 0    ketoconazole (NIZORAL) 2 % cream, Apply topically once daily., Disp: , Rfl:     levothyroxine (SYNTHROID) 150 MCG tablet, Take 1 tablet (150 mcg total) by mouth before breakfast. For thryoid, Disp: 90 tablet, Rfl: 1    methotrexate 2.5 MG Tab, Take 8 tablets (20 mg total) by mouth Every Friday., Disp: 32 tablet, Rfl: 5    omeprazole (PRILOSEC) 40  MG capsule, Take 1 capsule (40 mg total) by mouth once daily. For heartburn, Disp: 90 capsule, Rfl: 3    ondansetron (ZOFRAN-ODT) 4 MG TbDL, Take 1 tablet (4 mg total) by mouth every 8 (eight) hours as needed (nausea)., Disp: 30 tablet, Rfl: 0    pravastatin (PRAVACHOL) 40 MG tablet, Take 1 tablet (40 mg total) by mouth once daily., Disp: 90 tablet, Rfl: 3    traMADoL (ULTRAM) 50 mg tablet, Take 1 tablet (50 mg total) by mouth every 6 (six) hours as needed for Pain., Disp: 90 tablet, Rfl: 1    triamcinolone acetonide 0.025% (KENALOG) 0.025 % cream, Apply a thin layer to affected areas 1-2 times a day when having a flare up, Disp: 80 g, Rfl: 4    valsartan (DIOVAN) 80 MG tablet, Take 1 tablet (80 mg total) by mouth once daily., Disp: 90 tablet, Rfl: 3    vitamin D (VITAMIN D3) 1000 units Tab, Take 2,000 Units by mouth once daily. , Disp: , Rfl:     dicyclomine (BENTYL) 10 MG capsule, Take 1 capsule (10 mg total) by mouth 4 (four) times daily before meals and nightly., Disp: 120 capsule, Rfl: 0  No current facility-administered medications for this visit.    Facility-Administered Medications Ordered in Other Visits:     electrolyte-S (ISOLYTE), , Intravenous, Continuous, Shan Figueroa MD, Last Rate: 10 mL/hr at 10/05/23 0847, Restarted at 10/05/23 1238    electrolyte-S (ISOLYTE), , Intravenous, Continuous, Shan Figueroa MD, Stopped at 10/05/23 1447    fentaNYL 50 mcg/mL injection 25 mcg, 25 mcg, Intravenous, Q5 Min PRN, Shan Figueroa MD    LIDOcaine (PF) 10 mg/ml (1%) injection 10 mg, 1 mL, Intradermal, Once, Shan Figueroa MD    metoclopramide HCl injection 10 mg, 10 mg, Intravenous, Q10 Min PRN, Shan Figueroa MD    oxyCODONE immediate release tablet 5 mg, 5 mg, Oral, Q3H PRN, Shan Figueroa MD    Review of Systems       Objective:          8/28/2024     3:21 PM 9/17/2024     9:24 AM   Vitals - 1 value per visit   SYSTOLIC 108 120   DIASTOLIC 64 72   Pulse 62 52   SPO2 99 % 99 %   Weight (lb)  "144 146   Weight (kg) 65.318 66.225   Height 5' 1" (1.549 m) 5' 1" (1.549 m)   BMI (Calculated) 27.2 27.6      Physical Exam  Constitutional:       Appearance: Normal appearance.   HENT:      Head: Normocephalic and atraumatic.      Mouth/Throat:      Mouth: Mucous membranes are moist.   Eyes:      Conjunctiva/sclera: Conjunctivae normal.   Pulmonary:      Effort: Pulmonary effort is normal.   Abdominal:      General: Bowel sounds are normal. There is no distension.      Palpations: Abdomen is soft.      Tenderness: There is abdominal tenderness in the epigastric area.   Musculoskeletal:      Right wrist: Swelling present.   Neurological:      General: No focal deficit present.      Mental Status: She is alert and oriented to person, place, and time.   Psychiatric:         Mood and Affect: Mood normal.         Behavior: Behavior normal.           Assessment:       1. Epigastric pain    2. Rheumatoid arthritis involving multiple sites with positive rheumatoid factor    3. Ganglion, right wrist         Plan:       Epigastric pain  -     CT Abdomen Without Contrast; Future; Expected date: 09/17/2024  -     dicyclomine (BENTYL) 10 MG capsule; Take 1 capsule (10 mg total) by mouth 4 (four) times daily before meals and nightly.  Dispense: 120 capsule; Refill: 0  -     CBC Auto Differential; Future; Expected date: 09/17/2024  -     LIPASE; Future; Expected date: 09/17/2024  -     AMYLASE; Future; Expected date: 09/17/2024  -     HEPATIC FUNCTION PANEL; Future; Expected date: 09/17/2024    Rheumatoid arthritis involving multiple sites with positive rheumatoid factor  Comments:  no new issues    Ganglion, right wrist  -     Ambulatory referral/consult to Orthopedics; Future; Expected date: 09/24/2024      Medication List with Changes/Refills   New Medications    DICYCLOMINE (BENTYL) 10 MG CAPSULE    Take 1 capsule (10 mg total) by mouth 4 (four) times daily before meals and nightly.   Current Medications    ALLOPURINOL " (ZYLOPRIM) 300 MG TABLET    Take 1 tablet (300 mg total) by mouth once daily. For gout prevention    ASPIRIN (ECOTRIN) 81 MG EC TABLET    Take 1 tablet (81 mg total) by mouth once daily.    CARVEDILOL (COREG) 3.125 MG TABLET    TAKE 1 TABLET(3.125 MG) BY MOUTH TWICE DAILY WITH MEALS    DICLOFENAC SODIUM (VOLTAREN) 1 % GEL    Apply 2 g topically 2 (two) times daily as needed (hand and wrist pain).    DOCUSATE SODIUM (COLACE) 100 MG CAPSULE    Take 1 capsule (100 mg total) by mouth 2 (two) times daily as needed for Constipation.    FERROUS SULFATE (IRON ORAL)    Take 1 tablet by mouth once daily.    FOLIC ACID (FOLVITE) 1 MG TABLET    Take 1 mg by mouth once daily.    GABAPENTIN (NEURONTIN) 300 MG CAPSULE    Take 1 capsule (300 mg total) by mouth 2 (two) times daily.    IBUPROFEN (ADVIL,MOTRIN) 600 MG TABLET    Take 1 tablet (600 mg total) by mouth 3 (three) times daily.    KETOCONAZOLE (NIZORAL) 2 % CREAM    Apply topically once daily.    LEVOTHYROXINE (SYNTHROID) 150 MCG TABLET    Take 1 tablet (150 mcg total) by mouth before breakfast. For thryoid    METHOTREXATE 2.5 MG TAB    Take 8 tablets (20 mg total) by mouth Every Friday.    OMEPRAZOLE (PRILOSEC) 40 MG CAPSULE    Take 1 capsule (40 mg total) by mouth once daily. For heartburn    ONDANSETRON (ZOFRAN-ODT) 4 MG TBDL    Take 1 tablet (4 mg total) by mouth every 8 (eight) hours as needed (nausea).    PRAVASTATIN (PRAVACHOL) 40 MG TABLET    Take 1 tablet (40 mg total) by mouth once daily.    TRAMADOL (ULTRAM) 50 MG TABLET    Take 1 tablet (50 mg total) by mouth every 6 (six) hours as needed for Pain.    TRIAMCINOLONE ACETONIDE 0.025% (KENALOG) 0.025 % CREAM    Apply a thin layer to affected areas 1-2 times a day when having a flare up    VALSARTAN (DIOVAN) 80 MG TABLET    Take 1 tablet (80 mg total) by mouth once daily.    VITAMIN D (VITAMIN D3) 1000 UNITS TAB    Take 2,000 Units by mouth once daily.       Follow up in about 6 weeks (around 10/29/2024) for abd pain  .      Stable on medications continue current

## 2024-09-18 ENCOUNTER — TELEPHONE (OUTPATIENT)
Dept: FAMILY MEDICINE | Facility: CLINIC | Age: 74
End: 2024-09-18
Payer: MEDICARE

## 2024-09-18 LAB
ALBUMIN SERPL-MCNC: 3.9 G/DL (ref 3.6–5.1)
ALBUMIN/GLOB SERPL: 1.6 (CALC) (ref 1–2.5)
ALP SERPL-CCNC: 86 U/L (ref 37–153)
ALT SERPL-CCNC: 7 U/L (ref 6–29)
AMYLASE SERPL-CCNC: 38 U/L (ref 21–101)
AST SERPL-CCNC: 17 U/L (ref 10–35)
BASOPHILS # BLD AUTO: 91 CELLS/UL (ref 0–200)
BASOPHILS NFR BLD AUTO: 1.1 %
BILIRUB DIRECT SERPL-MCNC: 0.1 MG/DL
BILIRUB INDIRECT SERPL-MCNC: 0.4 MG/DL (CALC) (ref 0.2–1.2)
BILIRUB SERPL-MCNC: 0.5 MG/DL (ref 0.2–1.2)
EOSINOPHIL # BLD AUTO: 440 CELLS/UL (ref 15–500)
EOSINOPHIL NFR BLD AUTO: 5.3 %
ERYTHROCYTE [DISTWIDTH] IN BLOOD BY AUTOMATED COUNT: 12.6 % (ref 11–15)
GLOBULIN SER CALC-MCNC: 2.4 G/DL (CALC) (ref 1.9–3.7)
HCT VFR BLD AUTO: 35.2 % (ref 35–45)
HGB BLD-MCNC: 11.3 G/DL (ref 11.7–15.5)
LIPASE SERPL-CCNC: 20 U/L (ref 7–60)
LYMPHOCYTES # BLD AUTO: 2399 CELLS/UL (ref 850–3900)
LYMPHOCYTES NFR BLD AUTO: 28.9 %
MCH RBC QN AUTO: 33.8 PG (ref 27–33)
MCHC RBC AUTO-ENTMCNC: 32.1 G/DL (ref 32–36)
MCV RBC AUTO: 105.4 FL (ref 80–100)
MONOCYTES # BLD AUTO: 805 CELLS/UL (ref 200–950)
MONOCYTES NFR BLD AUTO: 9.7 %
NEUTROPHILS # BLD AUTO: 4565 CELLS/UL (ref 1500–7800)
NEUTROPHILS NFR BLD AUTO: 55 %
PLATELET # BLD AUTO: 283 THOUSAND/UL (ref 140–400)
PMV BLD REES-ECKER: 10 FL (ref 7.5–12.5)
PROT SERPL-MCNC: 6.3 G/DL (ref 6.1–8.1)
RBC # BLD AUTO: 3.34 MILLION/UL (ref 3.8–5.1)
WBC # BLD AUTO: 8.3 THOUSAND/UL (ref 3.8–10.8)

## 2024-09-19 ENCOUNTER — TELEPHONE (OUTPATIENT)
Dept: FAMILY MEDICINE | Facility: CLINIC | Age: 74
End: 2024-09-19
Payer: MEDICARE

## 2024-09-19 NOTE — TELEPHONE ENCOUNTER
----- Message from Josh Mccord MD sent at 9/18/2024  6:35 PM CDT -----  Please contact the patient and let them know that their results were fine and do not require any change in treatment.we will await CT scan results

## 2024-09-25 DIAGNOSIS — Z78.0 MENOPAUSE: ICD-10-CM

## 2024-09-30 ENCOUNTER — PATIENT MESSAGE (OUTPATIENT)
Dept: ADMINISTRATIVE | Facility: HOSPITAL | Age: 74
End: 2024-09-30
Payer: MEDICARE

## 2024-12-03 ENCOUNTER — TELEPHONE (OUTPATIENT)
Dept: FAMILY MEDICINE | Facility: CLINIC | Age: 74
End: 2024-12-03
Payer: MEDICARE

## 2024-12-03 NOTE — TELEPHONE ENCOUNTER
Pt daughter states she thinks pt is experiencing a rheumatoid flair up. States she is c/o joint pain and stiffness. States she does see rheumatology. Advised she would need to contact them my need steroid. Daughter voiced understanding.

## 2024-12-03 NOTE — TELEPHONE ENCOUNTER
----- Message from Med Assistant Quintana sent at 12/3/2024 11:20 AM CST -----  Patient wants a call back from a nurse, states she's been having a lot of stiffness in her arms and legs for the last week or so. States sometimes it takes a while to even be able to get up and move.     Patients number is [phone]

## 2024-12-23 RX ORDER — OSELTAMIVIR PHOSPHATE 75 MG/1
75 CAPSULE ORAL 2 TIMES DAILY
Qty: 10 CAPSULE | Refills: 0 | Status: SHIPPED | OUTPATIENT
Start: 2024-12-23 | End: 2024-12-28

## 2024-12-23 NOTE — TELEPHONE ENCOUNTER
----- Message from Kinsey sent at 12/23/2024  2:14 PM CST -----  Ivanna the patients daughter called. She said everyone has the Flu. Now her mom is getting it. Can you call in something for her. [facility] on . Ivanna's # [phone]

## 2024-12-23 NOTE — TELEPHONE ENCOUNTER
Spoke with patinets daughter Ivanna.  States patient has cough, sore throat, losing voice and body aches.  All symptoms started yesterday morning.  Is not taking anything otc for symptoms.  Ivanna states both she and her son have both tested positive for the flu.  Please advise -DN

## 2025-02-22 DIAGNOSIS — Z00.00 ENCOUNTER FOR MEDICARE ANNUAL WELLNESS EXAM: ICD-10-CM

## 2025-02-25 DIAGNOSIS — M10.9 GOUT, UNSPECIFIED CAUSE, UNSPECIFIED CHRONICITY, UNSPECIFIED SITE: ICD-10-CM

## 2025-02-25 NOTE — TELEPHONE ENCOUNTER
----- Message from Med Assistant Abdalla sent at 2/25/2025  3:48 PM CST -----  Daughter Adriana is calling stating [facility] [phone]

## 2025-02-26 RX ORDER — ALLOPURINOL 300 MG/1
300 TABLET ORAL DAILY
Qty: 90 TABLET | Refills: 3 | Status: SHIPPED | OUTPATIENT
Start: 2025-02-26

## 2025-02-26 NOTE — TELEPHONE ENCOUNTER
prescription sent to   [facility] #[id number] - [place], [place] - [address]  AT [facility] & [facility]  [address] [facility] [address]  Phone: [phone] Fax: [phone]

## 2025-03-17 DIAGNOSIS — J06.9 UPPER RESPIRATORY TRACT INFECTION, UNSPECIFIED TYPE: Primary | ICD-10-CM

## 2025-03-17 RX ORDER — AZITHROMYCIN 250 MG/1
250 TABLET, FILM COATED ORAL DAILY
Qty: 6 TABLET | Refills: 0 | Status: SHIPPED | OUTPATIENT
Start: 2025-03-17 | End: 2025-03-23

## 2025-03-17 NOTE — TELEPHONE ENCOUNTER
Pt last seen 8/28/24    Started with cold over the weekend. Dry cough, chills, congestion. Taking coricidin HBP. Ear ache ache started today.  Denies fever. Asking if something can be sent in. Very hard to get pt out of the house for an appt.   Allergies   Levaquin  Tramadol

## 2025-03-17 NOTE — TELEPHONE ENCOUNTER
The patient's prescription has been approved and sent to   [email or web address] #[id number] - [place], [place] - [address] AT [facility] & [facility]  [address]  [place] [address]  Phone: [phone] Fax: [phone]

## 2025-03-17 NOTE — TELEPHONE ENCOUNTER
----- Message from Med Assistant Abdalla sent at 3/17/2025 10:42 AM CDT -----  Daughter is calling stating patient is c/o ear pain.  Patient also started with a cold over the weekend as well.  Wanting something called in, states it is hard to get the patient out of the house.[facility] [phone]

## 2025-04-15 ENCOUNTER — TELEPHONE (OUTPATIENT)
Dept: FAMILY MEDICINE | Facility: CLINIC | Age: 75
End: 2025-04-15
Payer: MEDICARE

## 2025-04-15 NOTE — TELEPHONE ENCOUNTER
----- Message from Enma sent at 4/15/2025  3:05 PM CDT -----  Pt's daughter called to check if her mom's ortho referral is still active for Dr. Alhaji [phone]

## 2025-05-16 DIAGNOSIS — I10 ESSENTIAL HYPERTENSION: ICD-10-CM

## 2025-05-16 RX ORDER — VALSARTAN 80 MG/1
80 TABLET ORAL DAILY
Qty: 100 TABLET | Refills: 0 | Status: SHIPPED | OUTPATIENT
Start: 2025-05-16

## 2025-05-16 NOTE — TELEPHONE ENCOUNTER
This prescription has been approved and sent to   [id number] [address] [facility] AT [facility] & [address] [address]  Phone: [phone] Fax: [phone]

## 2025-05-20 ENCOUNTER — TELEPHONE (OUTPATIENT)
Dept: FAMILY MEDICINE | Facility: CLINIC | Age: 75
End: 2025-05-20
Payer: MEDICARE

## 2025-06-10 DIAGNOSIS — M05.79 RHEUMATOID ARTHRITIS INVOLVING MULTIPLE SITES WITH POSITIVE RHEUMATOID FACTOR: ICD-10-CM

## 2025-06-10 DIAGNOSIS — M54.16 LUMBAR RADICULOPATHY: ICD-10-CM

## 2025-06-10 RX ORDER — TRAMADOL HYDROCHLORIDE 50 MG/1
50 TABLET, FILM COATED ORAL EVERY 6 HOURS PRN
Qty: 90 TABLET | Refills: 1 | Status: SHIPPED | OUTPATIENT
Start: 2025-06-10

## 2025-06-10 NOTE — TELEPHONE ENCOUNTER
prescription sent to     "[facility] #[id number] - [place] - [address] AT [address] [facility]  [address]  [place] [address]  Phone: [phone] Fax: [phone]

## 2025-06-10 NOTE — TELEPHONE ENCOUNTER
----- Message from Med Assistant Lilian sent at 6/10/2025  2:08 PM CDT -----  Patients daughter is calling, need refill on Tramadol to [facility] on [phone]

## 2025-08-01 ENCOUNTER — TELEPHONE (OUTPATIENT)
Dept: FAMILY MEDICINE | Facility: CLINIC | Age: 75
End: 2025-08-01
Payer: MEDICAID

## 2025-08-01 NOTE — TELEPHONE ENCOUNTER
----- Message from Med Assistant Summers sent at 8/1/2025 10:28 AM CDT -----  Pt daughter Adriana is calling requesting a new order for walker says the brakes went out     Adriana# [phone]

## 2025-08-01 NOTE — TELEPHONE ENCOUNTER
----- Message from Kinsey sent at 8/1/2025 11:13 AM CDT -----  Adriana the  patient's daughter is returning Zohreh's call Adriana's # [phone]

## 2025-08-12 ENCOUNTER — TELEPHONE (OUTPATIENT)
Dept: FAMILY MEDICINE | Facility: CLINIC | Age: 75
End: 2025-08-12
Payer: MEDICARE

## (undated) DEVICE — UNDERGLOVES BIOGEL PI SIZE 8

## (undated) DEVICE — LINER SUCTION 3000CC

## (undated) DEVICE — PACK EXTREMITY ORTHOMAX

## (undated) DEVICE — BNDG COFLEX FOAM LF2 ST 6X5YD

## (undated) DEVICE — YANKAUER FLEX NO VENT HI CAP

## (undated) DEVICE — WRAP KNEE ACCU THERM GEL PACK

## (undated) DEVICE — SCRUB 10% POVIDONE IODINE 4OZ

## (undated) DEVICE — DRAPE ORTH SPLIT 77X108IN

## (undated) DEVICE — SUT STRATAFIX PDS 1 CTX 18IN

## (undated) DEVICE — COVER MAYO STND XL 30X57IN

## (undated) DEVICE — KIT TRIATHLON CR FEM PREP SZ3

## (undated) DEVICE — GLOVE SENSICARE PI GRN 7

## (undated) DEVICE — TOGA FLYTE PEEL AWAY XLARGE

## (undated) DEVICE — SUT STRATAFIX SPRL PS-2 3-0

## (undated) DEVICE — GOWN TOGA SYS PEELWY ZIP 2 XL

## (undated) DEVICE — MIXER BONE CEMENT

## (undated) DEVICE — INTERPULSE SET

## (undated) DEVICE — KIT CHECKPOINT MAKO

## (undated) DEVICE — WRAP PROTECTIVE LEG POS STRL

## (undated) DEVICE — GLOVE BIOGEL PI ORTHO PRO 7.5

## (undated) DEVICE — KIT DRAPE RIO ONE PIECE W/POCK

## (undated) DEVICE — CATH URETHRAL RED RUBBER 18FR

## (undated) DEVICE — GLOVE SURG ULTRA TOUCH 8

## (undated) DEVICE — GLOVE BIOGEL ORTHOPEDIC 7.5

## (undated) DEVICE — NDL SPINAL 18GX3.5 SPINOCAN

## (undated) DEVICE — PACK SIRUS BASIC V SURG STRL

## (undated) DEVICE — SOL NACL IRR 1000ML BTL

## (undated) DEVICE — SPONGE LAP 18X18 PREWASHED

## (undated) DEVICE — MANIFOLD 4 PORT

## (undated) DEVICE — PIN BONE 4 X 140MM STERILE
Type: IMPLANTABLE DEVICE | Site: KNEE | Status: NON-FUNCTIONAL
Removed: 2023-10-05

## (undated) DEVICE — PACK CUSTOM UNIV BASIN SLI

## (undated) DEVICE — SET DECANTER MEDICHOICE

## (undated) DEVICE — STRAP OR TABLE 5IN X 72IN

## (undated) DEVICE — COVER TABLE 44X90 STERILE

## (undated) DEVICE — SUT CTD VICRYL 0 UND BR

## (undated) DEVICE — SOL NACL IRR 3000ML

## (undated) DEVICE — APPLICATOR CHLORAPREP ORN 26ML

## (undated) DEVICE — ALCOHOL 70% ANTISEPTIC ISO 4OZ

## (undated) DEVICE — SPONGE BULKEE II ABSRB 6X6.75

## (undated) DEVICE — KIT TRIATHLON CR TIB PREP SZ4

## (undated) DEVICE — BLADE MAKO STANDARD

## (undated) DEVICE — BLADE SURG CARBON STEEL #10

## (undated) DEVICE — DRAPE INCISE IOBAN 2 23X17IN

## (undated) DEVICE — GLOVE SENSICARE PI GRN 7.5

## (undated) DEVICE — KIT VIZADISC KNEE TRACKING

## (undated) DEVICE — TOURNIQUET SB QC DP 34X4IN

## (undated) DEVICE — DRAPE THREE-QTR REINF 53X77IN

## (undated) DEVICE — TOWEL OR DISP STRL BLUE 4/PK

## (undated) DEVICE — BLADE SAGITTAL 18 X 1.27 X 90M

## (undated) DEVICE — SYR 50CC LL

## (undated) DEVICE — SYS CLSR DERMABOND PRINEO 22CM

## (undated) DEVICE — SUT 2/0 18IN COATED VICRYL

## (undated) DEVICE — GLOVE SENSICARE PI ALOE 7.5

## (undated) DEVICE — GLOVE SENSICARE PI ALOE 7

## (undated) DEVICE — DRAPE STERI INSTRUMENT 1018

## (undated) DEVICE — PIN BONE 4X110MM
Type: IMPLANTABLE DEVICE | Site: KNEE | Status: NON-FUNCTIONAL
Removed: 2023-10-05

## (undated) DEVICE — DRESSING AQUACEL AG ADV 3.5X12

## (undated) DEVICE — DRESSING N ADH OIL EMUL 3X8 3S

## (undated) DEVICE — BANDAGE ESMARK ELASTIC ST 6X9

## (undated) DEVICE — DRAPE STERI U-SHAPED 47X51IN

## (undated) DEVICE — STOCKING KNEE HIGH LG REGULAR

## (undated) DEVICE — ELECTRODE REM PLYHSV RETURN 9

## (undated) DEVICE — SLEEVE SCD EXPRESS KNEE MEDIUM